# Patient Record
Sex: MALE | Race: BLACK OR AFRICAN AMERICAN | NOT HISPANIC OR LATINO | Employment: OTHER | URBAN - METROPOLITAN AREA
[De-identification: names, ages, dates, MRNs, and addresses within clinical notes are randomized per-mention and may not be internally consistent; named-entity substitution may affect disease eponyms.]

---

## 2017-05-25 RX ORDER — PHENOL/SODIUM PHENOLATE
20 AEROSOL, SPRAY (ML) MUCOUS MEMBRANE DAILY
Status: ON HOLD | COMMUNITY
End: 2020-01-01 | Stop reason: HOSPADM

## 2017-05-25 RX ORDER — ALLOPURINOL 300 MG/1
TABLET ORAL
COMMUNITY
End: 2019-01-14

## 2017-05-25 RX ORDER — MULTIVITAMIN WITH IRON
1 TABLET ORAL DAILY
Status: ON HOLD | COMMUNITY
End: 2020-01-01 | Stop reason: HOSPADM

## 2017-05-25 RX ORDER — NAPROXEN SODIUM 220 MG/1
325 TABLET, FILM COATED ORAL
Status: ON HOLD | COMMUNITY
End: 2020-01-01 | Stop reason: HOSPADM

## 2017-05-25 RX ORDER — CARVEDILOL 25 MG/1
25 TABLET ORAL 2 TIMES DAILY WITH MEALS
Status: ON HOLD | COMMUNITY
End: 2020-01-01 | Stop reason: HOSPADM

## 2017-05-25 RX ORDER — SPIRONOLACTONE 25 MG/1
TABLET ORAL
COMMUNITY
End: 2019-04-11

## 2017-05-25 RX ORDER — AMLODIPINE BESYLATE 10 MG/1
TABLET ORAL
COMMUNITY
End: 2019-01-14

## 2017-05-25 RX ORDER — FINASTERIDE 5 MG/1
TABLET, FILM COATED ORAL
COMMUNITY
End: 2019-03-26 | Stop reason: SDUPTHER

## 2017-05-25 RX ORDER — LISINOPRIL 40 MG/1
20 TABLET ORAL DAILY
Status: ON HOLD | COMMUNITY
End: 2020-01-01 | Stop reason: HOSPADM

## 2017-05-25 RX ORDER — FUROSEMIDE 40 MG/1
TABLET ORAL EVERY OTHER DAY
COMMUNITY
End: 2019-04-11

## 2017-05-25 RX ORDER — GABAPENTIN 100 MG/1
100 CAPSULE ORAL 2 TIMES DAILY
Status: ON HOLD | COMMUNITY
End: 2020-01-01 | Stop reason: HOSPADM

## 2017-05-25 RX ORDER — ATORVASTATIN CALCIUM 40 MG/1
TABLET, FILM COATED ORAL
COMMUNITY
End: 2018-05-30 | Stop reason: ALTCHOICE

## 2017-05-25 RX ORDER — CARBOXYMETHYLCELLULOSE SODIUM 10 MG/ML
GEL OPHTHALMIC
COMMUNITY
End: 2018-05-30 | Stop reason: ALTCHOICE

## 2017-05-25 RX ORDER — POTASSIUM CHLORIDE 750 MG/1
CAPSULE, EXTENDED RELEASE ORAL
COMMUNITY
End: 2018-05-30 | Stop reason: ALTCHOICE

## 2017-06-01 ENCOUNTER — OFFICE VISIT (OUTPATIENT)
Dept: HEMATOLOGY/ONCOLOGY | Facility: CLINIC | Age: 77
End: 2017-06-01
Payer: MEDICARE

## 2017-06-01 VITALS
RESPIRATION RATE: 16 BRPM | DIASTOLIC BLOOD PRESSURE: 78 MMHG | HEIGHT: 71 IN | HEART RATE: 63 BPM | WEIGHT: 209.88 LBS | BODY MASS INDEX: 29.38 KG/M2 | TEMPERATURE: 98 F | SYSTOLIC BLOOD PRESSURE: 130 MMHG

## 2017-06-01 DIAGNOSIS — D64.9 ANEMIA, UNSPECIFIED: Primary | ICD-10-CM

## 2017-06-01 DIAGNOSIS — D63.1 ANEMIA IN CHRONIC KIDNEY DISEASE(285.21): ICD-10-CM

## 2017-06-01 DIAGNOSIS — D63.8 ANEMIA OF OTHER CHRONIC DISEASE: ICD-10-CM

## 2017-06-01 DIAGNOSIS — N18.30 CHRONIC KIDNEY DISEASE, STAGE III (MODERATE): ICD-10-CM

## 2017-06-01 DIAGNOSIS — N18.9 ANEMIA IN CHRONIC KIDNEY DISEASE(285.21): ICD-10-CM

## 2017-06-01 PROCEDURE — 1159F MED LIST DOCD IN RCRD: CPT | Mod: ,,, | Performed by: INTERNAL MEDICINE

## 2017-06-01 PROCEDURE — 1125F AMNT PAIN NOTED PAIN PRSNT: CPT | Mod: ,,, | Performed by: INTERNAL MEDICINE

## 2017-06-01 PROCEDURE — 99213 OFFICE O/P EST LOW 20 MIN: CPT | Mod: ,,, | Performed by: INTERNAL MEDICINE

## 2017-06-01 NOTE — PROGRESS NOTES
[facility] Hematology/Oncology            PROGRESS NOTE      Subjective:       Patient ID:   NAME: Asif Velez : 1940     76 y.o. male    Referring Doc: Obi Russell MD  Other Physicians: Mario Baugh    Chief Complaint:  Results ([facility])      History of Present Illness:     Patient returns today for a regularly scheduled follow-up visit.  The patient is here with his wife. He is doing ok. He denies any CP, SOB, HA's or N/V             ROS:   GEN: normal without any fever, night sweats or weight loss  HEENT: normal with no HA's, sore throat, stiff neck, changes in vision  CV: normal with no CP, SOB, PND, TUBBS or orthopnea  PULM: normal with no SOB, cough, hemoptysis, sputum or pleuritic pain  GI: normal with no abdominal pain, nausea, vomiting, constipation, diarrhea, melanotic stools, BRBPR, or hematemesis  : normal with no hematuria, dysuria  BREAST: normal with no mass, discharge, pain  SKIN: normal with no rash, erythema, bruising, or swelling    Allergies:  Review of patient's allergies indicates:  No Known Allergies    Medications:    Current Outpatient Prescriptions:     allopurinol (ZYLOPRIM) 300 MG tablet, Take by mouth., Disp: , Rfl:     amlodipine (NORVASC) 10 MG tablet, Take by mouth., Disp: , Rfl:     aspirin 81 MG Chew, Take by mouth., Disp: , Rfl:     atorvastatin (LIPITOR) 40 MG tablet, Take by mouth., Disp: , Rfl:     carboxymethylcellulose (REFRESH LIQUIGEL) 1 % ophthalmic solution, Apply to eye., Disp: , Rfl:     carvedilol (COREG) 25 MG tablet, Take by mouth., Disp: , Rfl:     finasteride (PROSCAR) 5 mg tablet, Take by mouth., Disp: , Rfl:     furosemide (LASIX) 40 MG tablet, Take by mouth., Disp: , Rfl:     gabapentin (NEURONTIN) 100 MG capsule, Take by mouth., Disp: , Rfl:     lisinopril (PRINIVIL,ZESTRIL) 40 MG tablet, Take by mouth., Disp: , Rfl:     multivitamin with iron Tab, Take by mouth., Disp: , Rfl:     omeprazole 20 mg TbEC, Take by mouth., Disp: , Rfl:      "potassium chloride (MICRO-K) 10 MEQ CpSR, Take by mouth., Disp: , Rfl:     spironolactone (ALDACTONE) 25 MG tablet, Take by mouth., Disp: , Rfl:     PMHx/PSHx Updates:  See patient's last visit with me on 2/1/17.  See H&P on 1/3/2017      Pathology:  No matching staging information was found for the patient.          Objective:     Vitals:  Blood pressure 130/78, pulse 63, temperature 98.1 °F (36.7 °C), resp. rate 16, height 5' 11" (1.803 m), weight 95.2 kg (209 lb 14.4 oz).    Physical Examination:   GEN: no apparent distress, comfortable; AAOx3  HEAD: atraumatic and normocephalic  EYES: no pallor, no icterus, PERRLA  ENT: OMM, no pharyngeal erythema, external ears WNL; no nasal discharge; no thrush  NECK: no masses, thyroid normal, trachea midline, no LAD/LN's, supple  CV: RRR with no murmur; normal pulse; normal S1 and S2; no pedal edema  CHEST: Normal respiratory effort; CTAB; normal breath sounds; no wheeze or crackles  ABDOM: nontender and nondistended; soft; normal bowel sounds; no rebound/guarding  MUSC/Skeletal: positive arthropathy; uses cane  EXTREM: no clubbing, cyanosis, inflammation or swelling  SKIN: no rashes, lesions, ulcers, petechiae or subcutaneous nodules  : no alvarez  NEURO: grossly intact; motor/sensory WNL; AAOx3; no tremors  PSYCH: normal mood, affect and behavior  LYMPH: normal cervical, supraclavicular, axillary and groin LN's            Labs:   [facility] 4/26/17 on chart      I have reviewed all available lab results and radiology reports.    Radiology/Diagnostic Studies:        Assessment/Plan:   (1) 76 y.o. male with diagnosis of multifactorial anemia issues that has NCNC parameters  - underlying anemia of chronic disorders and anemia of chronic renal disease  - [facility] Dr Ever [facility]izarro  - hgb adequate at 11.1 and relatively stable    (2) CAD, CHF and HTN - followed by Dr Baugh    (3) CRI - referred to Dr Martin with Neph but " he has not seen her as of yet    PLAN:  1. Continue to monitor labs q 3 months  2. F/u with PCP and Neph  3. RTC 6 months  4. Fax note to Lupis Russell [facility]ji and [facility]  5. Check on the nephrology appointment                   Discussion:     I have explained all of the above in detail and the patient understands all of the current recommendation(s). I have answered all of their questions to the best of my ability and to their complete satisfaction.   The patient is to continue with the current management plan.    RTC in  6 months          Electronically signed by Naseem Vazquez MD

## 2017-06-01 NOTE — LETTER
June 1, 2017      Obi Russell MD  [address] [place] [address] [address]  [place] [address]           [facility] Hematology Oncology  [address] [address] [facility]  [address]  [facility] [address]  Phone: [phone]  Fax: [phone]          Patient: Asif Velez   MR Number: [id number]   YOB: 1940   Date of Visit: 6/1/2017       Dear Dr. Obi Russell:    Thank you for referring Asif Velez to me for evaluation. Attached you will find relevant portions of my assessment and plan of care.    If you have questions, please do not hesitate to call me. I look forward to following Asif Velez along with you.    Sincerely,    Naseem Vazquez MD    Enclosure  CC:  No Recipients    If you would like to receive this communication electronically, please contact [email or web address] or [phone] to request more information on [employer] Link access.    For providers and/or their staff who would like to refer a patient to [facility], please contact us through our one-stop-shop provider referral line, [facility], at [phone].    If you feel you have received this communication in error or would no longer like to receive these types of communications, please e-mail [email or web address]

## 2017-10-20 LAB
AMBIG ABBREV CMP 14 DEFAULT: NORMAL
BASOPHILS # BLD AUTO: 0.1 X10E3/UL (ref 0–0.2)
BASOPHILS NFR BLD AUTO: 1 %
EOSINOPHIL # BLD AUTO: 0.2 X10E3/UL (ref 0–0.4)
EOSINOPHIL NFR BLD AUTO: 2 %
ERYTHROCYTE [DISTWIDTH] IN BLOOD BY AUTOMATED COUNT: 18.5 % (ref 12.3–15.4)
FERRITIN SERPL-MCNC: 90 NG/ML (ref 30–400)
HCT VFR BLD AUTO: 34.4 % (ref 37.5–51)
HGB BLD-MCNC: 11.3 G/DL (ref 12.6–17.7)
IMM GRANULOCYTES # BLD: 0 X10E3/UL (ref 0–0.1)
IMM GRANULOCYTES NFR BLD: 0 %
IRON SATN MFR SERPL: 18 % (ref 15–55)
IRON SERPL-MCNC: 50 UG/DL (ref 38–169)
LYMPHOCYTES # BLD AUTO: 2.6 X10E3/UL (ref 0.7–3.1)
LYMPHOCYTES NFR BLD AUTO: 36 %
MCH RBC QN AUTO: 28 PG (ref 26.6–33)
MCHC RBC AUTO-ENTMCNC: 32.8 G/DL (ref 31.5–35.7)
MCV RBC AUTO: 85 FL (ref 79–97)
MONOCYTES # BLD AUTO: 0.5 X10E3/UL (ref 0.1–0.9)
MONOCYTES NFR BLD AUTO: 7 %
NEUTROPHILS # BLD AUTO: 4 X10E3/UL (ref 1.4–7)
NEUTROPHILS NFR BLD AUTO: 54 %
PLATELET # BLD AUTO: 322 X10E3/UL (ref 150–379)
RBC # BLD AUTO: 4.03 X10E6/UL (ref 4.14–5.8)
TIBC SERPL-MCNC: 285 UG/DL (ref 250–450)
UIBC SERPL-MCNC: 235 UG/DL (ref 111–343)
WBC # BLD AUTO: 7.4 X10E3/UL (ref 3.4–10.8)

## 2017-11-29 LAB
ALBUMIN SERPL-MCNC: 4.3 G/DL (ref 3.5–4.8)
ALBUMIN/GLOB SERPL: 1.5 {RATIO} (ref 1.2–2.2)
ALP SERPL-CCNC: 60 IU/L (ref 39–117)
ALT SERPL-CCNC: 12 IU/L (ref 0–44)
AMBIG ABBREV CMP 14 DEFAULT: NORMAL
AST SERPL-CCNC: 21 IU/L (ref 0–40)
BASOPHILS # BLD AUTO: 0 X10E3/UL (ref 0–0.2)
BASOPHILS NFR BLD AUTO: 1 %
BILIRUB SERPL-MCNC: 0.3 MG/DL (ref 0–1.2)
BUN SERPL-MCNC: 23 MG/DL (ref 8–27)
BUN/CREAT SERPL: 15 (ref 10–24)
CALCIUM SERPL-MCNC: 9.8 MG/DL (ref 8.6–10.2)
CHLORIDE SERPL-SCNC: 105 MMOL/L (ref 96–106)
CO2 SERPL-SCNC: 21 MMOL/L (ref 18–29)
CREAT SERPL-MCNC: 1.58 MG/DL (ref 0.76–1.27)
EOSINOPHIL # BLD AUTO: 0.2 X10E3/UL (ref 0–0.4)
EOSINOPHIL NFR BLD AUTO: 2 %
ERYTHROCYTE [DISTWIDTH] IN BLOOD BY AUTOMATED COUNT: 17.9 % (ref 12.3–15.4)
FERRITIN SERPL-MCNC: 83 NG/ML (ref 30–400)
GFR SERPLBLD CREATININE-BSD FMLA CKD-EPI: 41 ML/MIN/1.73
GFR SERPLBLD CREATININE-BSD FMLA CKD-EPI: 48 ML/MIN/1.73
GLOBULIN SER CALC-MCNC: 2.8 G/DL (ref 1.5–4.5)
GLUCOSE SERPL-MCNC: 103 MG/DL (ref 65–99)
HCT VFR BLD AUTO: 35.5 % (ref 37.5–51)
HGB BLD-MCNC: 11.3 G/DL (ref 12.6–17.7)
IMM GRANULOCYTES # BLD: 0 X10E3/UL (ref 0–0.1)
IMM GRANULOCYTES NFR BLD: 0 %
IRON SATN MFR SERPL: 23 % (ref 15–55)
IRON SERPL-MCNC: 64 UG/DL (ref 38–169)
LYMPHOCYTES # BLD AUTO: 2.4 X10E3/UL (ref 0.7–3.1)
LYMPHOCYTES NFR BLD AUTO: 35 %
MCH RBC QN AUTO: 27.8 PG (ref 26.6–33)
MCHC RBC AUTO-ENTMCNC: 31.8 G/DL (ref 31.5–35.7)
MCV RBC AUTO: 87 FL (ref 79–97)
MONOCYTES # BLD AUTO: 0.5 X10E3/UL (ref 0.1–0.9)
MONOCYTES NFR BLD AUTO: 7 %
NEUTROPHILS # BLD AUTO: 3.9 X10E3/UL (ref 1.4–7)
NEUTROPHILS NFR BLD AUTO: 55 %
PLATELET # BLD AUTO: 329 X10E3/UL (ref 150–379)
POTASSIUM SERPL-SCNC: 3.9 MMOL/L (ref 3.5–5.2)
PROT SERPL-MCNC: 7.1 G/DL (ref 6–8.5)
RBC # BLD AUTO: 4.06 X10E6/UL (ref 4.14–5.8)
SODIUM SERPL-SCNC: 143 MMOL/L (ref 134–144)
TIBC SERPL-MCNC: 273 UG/DL (ref 250–450)
UIBC SERPL-MCNC: 209 UG/DL (ref 111–343)
WBC # BLD AUTO: 6.9 X10E3/UL (ref 3.4–10.8)

## 2018-01-08 ENCOUNTER — OFFICE VISIT (OUTPATIENT)
Dept: HEMATOLOGY/ONCOLOGY | Facility: CLINIC | Age: 78
End: 2018-01-08
Payer: MEDICARE

## 2018-01-08 VITALS
WEIGHT: 219.69 LBS | DIASTOLIC BLOOD PRESSURE: 75 MMHG | RESPIRATION RATE: 18 BRPM | HEART RATE: 65 BPM | HEIGHT: 71 IN | TEMPERATURE: 98 F | BODY MASS INDEX: 30.76 KG/M2 | SYSTOLIC BLOOD PRESSURE: 125 MMHG

## 2018-01-08 DIAGNOSIS — N18.30 CHRONIC KIDNEY DISEASE, STAGE III (MODERATE): ICD-10-CM

## 2018-01-08 DIAGNOSIS — D63.8 ANEMIA, CHRONIC DISEASE: ICD-10-CM

## 2018-01-08 DIAGNOSIS — D64.9 ANEMIA, UNSPECIFIED TYPE: Primary | ICD-10-CM

## 2018-01-08 PROCEDURE — 99213 OFFICE O/P EST LOW 20 MIN: CPT | Mod: ,,, | Performed by: INTERNAL MEDICINE

## 2018-01-08 NOTE — LETTER
January 8, 2018      Obi Russell MD  [address] [place] [address] [address]  [facility] [address]           [facility] Hematology Oncology  [address]  [address]  [facility] [address]  Phone: [phone]  Fax: [phone]          Patient: Asif Velez   MR Number: [id number]   YOB: 1940   Date of Visit: 1/8/2018       Dear Dr. Obi Russell:    Thank you for referring Asif Velez to me for evaluation. Attached you will find relevant portions of my assessment and plan of care.    If you have questions, please do not hesitate to call me. I look forward to following Asif Velez along with you.    Sincerely,    Naseem Vazquez MD    Enclosure  CC:  No Recipients    If you would like to receive this communication electronically, please contact [email or web address] or [phone] to request more information on [employer] Link access.    For providers and/or their staff who would like to refer a patient to [facility], please contact us through our one-stop-shop provider referral line, [facility], at [phone].    If you feel you have received this communication in error or would no longer like to receive these types of communications, please e-mail [email or web address]

## 2018-01-08 NOTE — PROGRESS NOTES
[facility] Hematology/Oncology            PROGRESS NOTE      Subjective:       Patient ID:   NAME: Asif Velez : 1940     77 y.o. male    Referring Doc: Obi Russell MD  Other Physicians: Mario Baugh    Chief Complaint:  Anemia f/u    History of Present Illness:     Patient returns today for a regularly scheduled follow-up visit.  The patient is here with his wife. He is doing ok. He denies any CP, SOB, HA's or N/V             ROS:   GEN: normal without any fever, night sweats or weight loss  HEENT: normal with no HA's, sore throat, stiff neck, changes in vision  CV: normal with no CP, SOB, PND, TUBBS or orthopnea  PULM: normal with no SOB, cough, hemoptysis, sputum or pleuritic pain  GI: normal with no abdominal pain, nausea, vomiting, constipation, diarrhea, melanotic stools, BRBPR, or hematemesis  : normal with no hematuria, dysuria  BREAST: normal with no mass, discharge, pain  SKIN: normal with no rash, erythema, bruising, or swelling    Allergies:  Review of patient's allergies indicates:  No Known Allergies    Medications:    Current Outpatient Prescriptions:     allopurinol (ZYLOPRIM) 300 MG tablet, Take by mouth., Disp: , Rfl:     amlodipine (NORVASC) 10 MG tablet, Take by mouth., Disp: , Rfl:     aspirin 81 MG Chew, Take by mouth., Disp: , Rfl:     atorvastatin (LIPITOR) 40 MG tablet, Take by mouth., Disp: , Rfl:     carboxymethylcellulose (REFRESH LIQUIGEL) 1 % ophthalmic solution, Apply to eye., Disp: , Rfl:     carvedilol (COREG) 25 MG tablet, Take by mouth., Disp: , Rfl:     finasteride (PROSCAR) 5 mg tablet, Take by mouth., Disp: , Rfl:     furosemide (LASIX) 40 MG tablet, Take by mouth., Disp: , Rfl:     gabapentin (NEURONTIN) 100 MG capsule, Take by mouth., Disp: , Rfl:     lisinopril (PRINIVIL,ZESTRIL) 40 MG tablet, Take by mouth., Disp: , Rfl:     multivitamin with iron Tab, Take by mouth., Disp: , Rfl:     omeprazole 20 mg TbEC, Take by mouth., Disp: , Rfl:     potassium  "chloride (MICRO-K) 10 MEQ CpSR, Take by mouth., Disp: , Rfl:     spironolactone (ALDACTONE) 25 MG tablet, Take by mouth., Disp: , Rfl:     PMHx/PSHx Updates:  See patient's last visit with me on 2/1/17.  See H&P on 1/3/2017      Pathology:  No matching staging information was found for the patient.          Objective:     Vitals:  Blood pressure 125/75, pulse 65, temperature 98.1 °F (36.7 °C), resp. rate 18, height 5' 11" (1.803 m), weight 99.7 kg (219 lb 11.2 oz).    Physical Examination:   GEN: no apparent distress, comfortable; AAOx3  HEAD: atraumatic and normocephalic  EYES: no pallor, no icterus, PERRLA  ENT: OMM, no pharyngeal erythema, external ears WNL; no nasal discharge; no thrush  NECK: no masses, thyroid normal, trachea midline, no LAD/LN's, supple  CV: RRR with no murmur; normal pulse; normal S1 and S2; no pedal edema  CHEST: Normal respiratory effort; CTAB; normal breath sounds; no wheeze or crackles  ABDOM: nontender and nondistended; soft; normal bowel sounds; no rebound/guarding  MUSC/Skeletal: positive arthropathy; uses cane  EXTREM: no clubbing, cyanosis, inflammation or swelling  SKIN: no rashes, lesions, ulcers, petechiae or subcutaneous nodules  : no alvarez  NEURO: grossly intact; motor/sensory WNL; AAOx3; no tremors  PSYCH: normal mood, affect and behavior  LYMPH: normal cervical, supraclavicular, axillary and groin LN's            Labs:   11/28/2017  Lab Results   Component Value Date    WBC 6.9 11/28/2017    HGB 11.3 (L) 11/28/2017    HCT 35.5 (L) 11/28/2017    MCV 87 11/28/2017     11/28/2017     CMP  Sodium   Date Value Ref Range Status   11/28/2017 143 134 - 144 mmol/L Final     Potassium   Date Value Ref Range Status   11/28/2017 3.9 3.5 - 5.2 mmol/L Final     Chloride   Date Value Ref Range Status   11/28/2017 105 96 - 106 mmol/L Final     CO2   Date Value Ref Range Status   11/28/2017 21 18 - 29 mmol/L Final     Glucose   Date Value Ref Range Status   11/28/2017 103 (H) 65 - " 99 mg/dL Final     BUN, Bld   Date Value Ref Range Status   11/28/2017 23 8 - 27 mg/dL Final     Creatinine   Date Value Ref Range Status   11/28/2017 1.58 (H) 0.76 - 1.27 mg/dL Final     Calcium   Date Value Ref Range Status   11/28/2017 9.8 8.6 - 10.2 mg/dL Final     Total Protein   Date Value Ref Range Status   11/28/2017 7.1 6.0 - 8.5 g/dL Final     Albumin   Date Value Ref Range Status   11/28/2017 4.3 3.5 - 4.8 g/dL Final     Total Bilirubin   Date Value Ref Range Status   11/28/2017 0.3 0.0 - 1.2 mg/dL Final     Alkaline Phosphatase   Date Value Ref Range Status   11/28/2017 60 39 - 117 IU/L Final     AST   Date Value Ref Range Status   11/28/2017 21 0 - 40 IU/L Final     ALT   Date Value Ref Range Status   11/28/2017 12 0 - 44 IU/L Final     eGFR if non    Date Value Ref Range Status   11/28/2017 41 (L) >59 mL/min/1.73 Final     Lab Results   Component Value Date    ALT 12 11/28/2017    AST 21 11/28/2017    ALKPHOS 60 11/28/2017    BILITOT 0.3 11/28/2017     Lab Results   Component Value Date    IRON 64 11/28/2017    TIBC 273 11/28/2017    FERRITIN 83 11/28/2017           I have reviewed all available lab results and radiology reports.    Radiology/Diagnostic Studies:        Assessment/Plan:   (1) 77 y.o. male with diagnosis of multifactorial anemia issues that has NCNC parameters  - underlying anemia of chronic disorders and anemia of chronic renal disease  - [facility] Ever Pizarro  - hgb adequate at 11.3 and relatively stable    (2) CAD, CHF and HTN - followed by Dr Baugh    (3) CRI - referred to Dr Martin with Neph but he still has not seen her as of yet    PLAN:  1. Continue to monitor labs q 3 months  2. F/u with PCP and Neph  3. RTC 6 months  4. Fax note to Lupis Russell Ganji and Juarez  5. Check on the nephrology appointment                   Discussion:     I have explained all of the above in detail and the patient  understands all of the current recommendation(s). I have answered all of their questions to the best of my ability and to their complete satisfaction.   The patient is to continue with the current management plan.    RTC in  6 months          Electronically signed by Naseem Vazquez MD

## 2018-05-05 LAB
ALBUMIN SERPL-MCNC: 4.1 G/DL (ref 3.5–4.8)
ALBUMIN/GLOB SERPL: 1.4 {RATIO} (ref 1.2–2.2)
ALP SERPL-CCNC: 70 IU/L (ref 39–117)
ALT SERPL-CCNC: 12 IU/L (ref 0–44)
AST SERPL-CCNC: 17 IU/L (ref 0–40)
BASOPHILS # BLD AUTO: 0 X10E3/UL (ref 0–0.2)
BASOPHILS NFR BLD AUTO: 0 %
BILIRUB SERPL-MCNC: 0.6 MG/DL (ref 0–1.2)
BUN SERPL-MCNC: 20 MG/DL (ref 8–27)
BUN/CREAT SERPL: 13 (ref 10–24)
CALCIUM SERPL-MCNC: 9.3 MG/DL (ref 8.6–10.2)
CHLORIDE SERPL-SCNC: 106 MMOL/L (ref 96–106)
CO2 SERPL-SCNC: 20 MMOL/L (ref 18–29)
CREAT SERPL-MCNC: 1.52 MG/DL (ref 0.76–1.27)
EOSINOPHIL # BLD AUTO: 0.2 X10E3/UL (ref 0–0.4)
EOSINOPHIL NFR BLD AUTO: 2 %
ERYTHROCYTE [DISTWIDTH] IN BLOOD BY AUTOMATED COUNT: 18.3 % (ref 12.3–15.4)
FERRITIN SERPL-MCNC: 124 NG/ML (ref 30–400)
GFR SERPLBLD CREATININE-BSD FMLA CKD-EPI: 43 ML/MIN/1.73
GFR SERPLBLD CREATININE-BSD FMLA CKD-EPI: 50 ML/MIN/1.73
GLOBULIN SER CALC-MCNC: 2.9 G/DL (ref 1.5–4.5)
GLUCOSE SERPL-MCNC: 97 MG/DL (ref 65–99)
HCT VFR BLD AUTO: 34.5 % (ref 37.5–51)
HGB BLD-MCNC: 10.7 G/DL (ref 13–17.7)
IMM GRANULOCYTES # BLD: 0 X10E3/UL (ref 0–0.1)
IMM GRANULOCYTES NFR BLD: 0 %
IRON SATN MFR SERPL: 10 % (ref 15–55)
IRON SERPL-MCNC: 24 UG/DL (ref 38–169)
LYMPHOCYTES # BLD AUTO: 2.5 X10E3/UL (ref 0.7–3.1)
LYMPHOCYTES NFR BLD AUTO: 28 %
MCH RBC QN AUTO: 27.2 PG (ref 26.6–33)
MCHC RBC AUTO-ENTMCNC: 31 G/DL (ref 31.5–35.7)
MCV RBC AUTO: 88 FL (ref 79–97)
MONOCYTES # BLD AUTO: 0.5 X10E3/UL (ref 0.1–0.9)
MONOCYTES NFR BLD AUTO: 6 %
NEUTROPHILS # BLD AUTO: 5.9 X10E3/UL (ref 1.4–7)
NEUTROPHILS NFR BLD AUTO: 64 %
PLATELET # BLD AUTO: 279 X10E3/UL (ref 150–379)
POTASSIUM SERPL-SCNC: 4.3 MMOL/L (ref 3.5–5.2)
PROT SERPL-MCNC: 7 G/DL (ref 6–8.5)
RBC # BLD AUTO: 3.93 X10E6/UL (ref 4.14–5.8)
SODIUM SERPL-SCNC: 143 MMOL/L (ref 134–144)
TIBC SERPL-MCNC: 247 UG/DL (ref 250–450)
UIBC SERPL-MCNC: 223 UG/DL (ref 111–343)
WBC # BLD AUTO: 9.1 X10E3/UL (ref 3.4–10.8)

## 2018-05-30 ENCOUNTER — OFFICE VISIT (OUTPATIENT)
Dept: UROLOGY | Facility: CLINIC | Age: 78
End: 2018-05-30
Payer: MEDICARE

## 2018-05-30 ENCOUNTER — APPOINTMENT (OUTPATIENT)
Dept: LAB | Facility: HOSPITAL | Age: 78
End: 2018-05-30
Attending: UROLOGY
Payer: MEDICARE

## 2018-05-30 VITALS
BODY MASS INDEX: 30.65 KG/M2 | RESPIRATION RATE: 18 BRPM | TEMPERATURE: 98 F | HEART RATE: 82 BPM | HEIGHT: 71 IN | SYSTOLIC BLOOD PRESSURE: 123 MMHG | DIASTOLIC BLOOD PRESSURE: 66 MMHG | WEIGHT: 218.94 LBS

## 2018-05-30 DIAGNOSIS — R82.81 PYURIA: ICD-10-CM

## 2018-05-30 DIAGNOSIS — R31.29 MICROHEMATURIA: Primary | ICD-10-CM

## 2018-05-30 DIAGNOSIS — R10.9 LEFT FLANK PAIN: ICD-10-CM

## 2018-05-30 DIAGNOSIS — N28.1 RENAL CYST: ICD-10-CM

## 2018-05-30 DIAGNOSIS — N40.0 BENIGN PROSTATIC HYPERPLASIA WITHOUT LOWER URINARY TRACT SYMPTOMS: ICD-10-CM

## 2018-05-30 LAB
BILIRUB SERPL-MCNC: ABNORMAL MG/DL
BLOOD URINE, POC: ABNORMAL
COLOR, POC UA: YELLOW
GLUCOSE UR QL STRIP: ABNORMAL
KETONES UR QL STRIP: ABNORMAL
LEUKOCYTE ESTERASE URINE, POC: ABNORMAL
NITRITE, POC UA: ABNORMAL
PH, POC UA: 5
PROTEIN, POC: ABNORMAL
SPECIFIC GRAVITY, POC UA: 1.01
UROBILINOGEN, POC UA: ABNORMAL

## 2018-05-30 PROCEDURE — 87186 SC STD MICRODIL/AGAR DIL: CPT

## 2018-05-30 PROCEDURE — 87088 URINE BACTERIA CULTURE: CPT

## 2018-05-30 PROCEDURE — 81002 URINALYSIS NONAUTO W/O SCOPE: CPT | Mod: S$GLB,,, | Performed by: UROLOGY

## 2018-05-30 PROCEDURE — 99999 PR PBB SHADOW E&M-EST. PATIENT-LVL III: CPT | Mod: PBBFAC,,, | Performed by: UROLOGY

## 2018-05-30 PROCEDURE — 99204 OFFICE O/P NEW MOD 45 MIN: CPT | Mod: 25,S$GLB,, | Performed by: UROLOGY

## 2018-05-30 PROCEDURE — 87077 CULTURE AEROBIC IDENTIFY: CPT

## 2018-05-30 PROCEDURE — 87086 URINE CULTURE/COLONY COUNT: CPT

## 2018-05-30 PROCEDURE — 88112 CYTOPATH CELL ENHANCE TECH: CPT | Performed by: PATHOLOGY

## 2018-05-30 PROCEDURE — 88112 CYTOPATH CELL ENHANCE TECH: CPT | Mod: 26,,, | Performed by: PATHOLOGY

## 2018-05-30 RX ORDER — LANOLIN ALCOHOL/MO/W.PET/CERES
400 CREAM (GRAM) TOPICAL DAILY
Status: ON HOLD | COMMUNITY
End: 2020-01-01 | Stop reason: HOSPADM

## 2018-05-30 RX ORDER — AMOXICILLIN 500 MG
CAPSULE ORAL DAILY
COMMUNITY
End: 2019-04-11
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
## 2018-05-30 NOTE — LETTER
May 30, 2018      Obi Russell MD  [address] [place] [address] [address]  [facility] [address]           [place] - Urology  [address] [place] [address] [address]  [place] [address]  Phone: [phone]  Fax: [phone]          Patient: Asif Velez   MR Number: [id number]   YOB: 1940   Date of Visit: 5/30/2018       Dear Dr. Obi Russell:    Thank you for referring Asif Velez to me for evaluation. Attached you will find relevant portions of my assessment and plan of care.    If you have questions, please do not hesitate to call me. I look forward to following Asif Velez along with you.    Sincerely,    Ange Chacon MD    Enclosure  CC:  No Recipients    If you would like to receive this communication electronically, please contact [email or web address] or [phone] to request more information on [employer] Link access.    For providers and/or their staff who would like to refer a patient to [facility], please contact us through our one-stop-shop provider referral line, [facility], at [phone].    If you feel you have received this communication in error or would no longer like to receive these types of communications, please e-mail [email or web address]

## 2018-06-02 LAB — BACTERIA UR CULT: NORMAL

## 2018-06-03 RX ORDER — SULFAMETHOXAZOLE AND TRIMETHOPRIM 800; 160 MG/1; MG/1
1 TABLET ORAL 2 TIMES DAILY
Qty: 30 TABLET | Refills: 0 | OUTPATIENT
Start: 2018-06-03 | End: 2018-06-13

## 2018-06-05 ENCOUNTER — HOSPITAL ENCOUNTER (OUTPATIENT)
Dept: RADIOLOGY | Facility: HOSPITAL | Age: 78
Discharge: HOME | End: 2018-06-05
Attending: UROLOGY
Payer: MEDICARE

## 2018-06-05 DIAGNOSIS — N28.1 RENAL CYST: ICD-10-CM

## 2018-06-05 PROCEDURE — 76770 US EXAM ABDO BACK WALL COMP: CPT | Mod: 26,,, | Performed by: RADIOLOGY

## 2018-06-05 PROCEDURE — 76770 US EXAM ABDO BACK WALL COMP: CPT | Mod: TC

## 2018-06-06 ENCOUNTER — TELEPHONE (OUTPATIENT)
Dept: UROLOGY | Facility: CLINIC | Age: 78
End: 2018-06-06

## 2018-06-06 NOTE — TELEPHONE ENCOUNTER
Call placed to give urine culture results and to inform of antibiotic being sent in to his pharmacy, no answer, message left with call back number.

## 2018-06-06 NOTE — TELEPHONE ENCOUNTER
----- Message from Ange Chacon MD sent at 6/3/2018  5:11 PM CDT -----  C&S - e.coli    Rec: Bactrim DS po bid x 2 weeks           Awaiting renal U/S and Cytology

## 2018-06-21 ENCOUNTER — TELEPHONE (OUTPATIENT)
Dept: UROLOGY | Facility: CLINIC | Age: 78
End: 2018-06-21

## 2018-06-21 NOTE — TELEPHONE ENCOUNTER
----- Message from Marie Ivan sent at 6/21/2018  1:28 PM CDT -----  Type:  Test Results    Who Called:  Patient  Name of Test (Lab/Mammo/Etc): Labs   Date of Test:  May 30th  Ordering Provider:  Dr. Chacon  Where the test was performed:  [facility]  Best Call Back Number:  [phone]  Additional Information:

## 2018-06-21 NOTE — TELEPHONE ENCOUNTER
Returned call and informed patient that the MD just came back and is working on results, once they have been resulted, will call him back to inform.

## 2018-08-02 ENCOUNTER — OFFICE VISIT (OUTPATIENT)
Dept: HEMATOLOGY/ONCOLOGY | Facility: CLINIC | Age: 78
End: 2018-08-02
Payer: MEDICARE

## 2018-08-02 VITALS
WEIGHT: 217.38 LBS | RESPIRATION RATE: 18 BRPM | TEMPERATURE: 98 F | SYSTOLIC BLOOD PRESSURE: 156 MMHG | DIASTOLIC BLOOD PRESSURE: 85 MMHG | HEART RATE: 81 BPM | BODY MASS INDEX: 30.32 KG/M2

## 2018-08-02 DIAGNOSIS — D64.9 ANEMIA, UNSPECIFIED TYPE: Primary | ICD-10-CM

## 2018-08-02 DIAGNOSIS — D63.1 ANEMIA, CHRONIC RENAL FAILURE, STAGE 3 (MODERATE): ICD-10-CM

## 2018-08-02 DIAGNOSIS — D64.89 ANEMIA DUE TO MULTIPLE MECHANISMS: ICD-10-CM

## 2018-08-02 DIAGNOSIS — N18.30 ANEMIA, CHRONIC RENAL FAILURE, STAGE 3 (MODERATE): ICD-10-CM

## 2018-08-02 DIAGNOSIS — D63.8 ANEMIA, CHRONIC DISEASE: ICD-10-CM

## 2018-08-02 PROCEDURE — 99213 OFFICE O/P EST LOW 20 MIN: CPT | Mod: ,,, | Performed by: INTERNAL MEDICINE

## 2018-08-02 NOTE — LETTER
August 2, 2018      Obi Russell MD  [address] [place] [address] [address]  [place] [address]           [facility] - Hematology Oncology  [address]  [address]  [place] [address]  Phone: [phone]  Fax: [phone]          Patient: Asif Velez   MR Number: [id number]   YOB: 1940   Date of Visit: 8/2/2018       Dear Dr. Obi Russell:    Thank you for referring Asif Velez to me for evaluation. Attached you will find relevant portions of my assessment and plan of care.    If you have questions, please do not hesitate to call me. I look forward to following Asif Velez along with you.    Sincerely,    Naseem Vazquez MD    Enclosure  CC:  No Recipients    If you would like to receive this communication electronically, please contact [email or web address] or [phone] to request more information on [employer] Link access.    For providers and/or their staff who would like to refer a patient to [facility], please contact us through our one-stop-shop provider referral line, [facility], at [phone].    If you feel you have received this communication in error or would no longer like to receive these types of communications, please e-mail [email or web address]

## 2018-08-02 NOTE — PROGRESS NOTES
[facility] Hematology/Oncology            PROGRESS NOTE      Subjective:       Patient ID:   NAME: Asif Velez : 1940     77 y.o. male    Referring Doc: Obi Russell MD  Other Physicians: Mario Baugh    Chief Complaint:  Anemia f/u    History of Present Illness:     Patient returns today for a regularly scheduled follow-up visit.  The patient is here by himself. He is doing ok except for some ankle swelling left greater than right. He also has some skin itching in lower extremities. He saw his PCP and had US done. He denies any CP, SOB, HA's or N/V             ROS:   GEN: normal without any fever, night sweats or weight loss  HEENT: normal with no HA's, sore throat, stiff neck, changes in vision  CV: normal with no CP, SOB, PND, TUBBS or orthopnea  PULM: normal with no SOB, cough, hemoptysis, sputum or pleuritic pain  GI: normal with no abdominal pain, nausea, vomiting, constipation, diarrhea, melanotic stools, BRBPR, or hematemesis  : normal with no hematuria, dysuria  BREAST: normal with no mass, discharge, pain  SKIN: normal with no rash, erythema, bruising, or swelling    Allergies:  Review of patient's allergies indicates:  No Known Allergies    Medications:    Current Outpatient Prescriptions:     allopurinol (ZYLOPRIM) 300 MG tablet, Take by mouth., Disp: , Rfl:     amlodipine (NORVASC) 10 MG tablet, Take by mouth., Disp: , Rfl:     aspirin 81 MG Chew, Take by mouth., Disp: , Rfl:     carvedilol (COREG) 25 MG tablet, Take by mouth., Disp: , Rfl:     finasteride (PROSCAR) 5 mg tablet, Take by mouth., Disp: , Rfl:     fish oil-omega-3 fatty acids 300-1,000 mg capsule, Take by mouth once daily., Disp: , Rfl:     furosemide (LASIX) 40 MG tablet, Take by mouth every other day. , Disp: , Rfl:     gabapentin (NEURONTIN) 100 MG capsule, Take by mouth., Disp: , Rfl:     lisinopril (PRINIVIL,ZESTRIL) 40 MG tablet, Take by mouth., Disp: , Rfl:     magnesium oxide (MAG-OX) 400 mg tablet, Take 400  mg by mouth once daily., Disp: , Rfl:     multivitamin with iron Tab, Take by mouth., Disp: , Rfl:     omeprazole 20 mg TbEC, Take by mouth., Disp: , Rfl:     spironolactone (ALDACTONE) 25 MG tablet, Take by mouth., Disp: , Rfl:     PMHx/PSHx Updates:  See patient's last visit with me on 1/8/2018.  See H&P on 1/3/2017      Pathology:  No matching staging information was found for the patient.          Objective:     Vitals:  Blood pressure (!) 156/85, pulse 81, temperature 98.1 °F (36.7 °C), resp. rate 18, weight 98.6 kg (217 lb 6.4 oz).    Physical Examination:   GEN: no apparent distress, comfortable; AAOx3  HEAD: atraumatic and normocephalic  EYES: no pallor, no icterus, PERRLA  ENT: OMM, no pharyngeal erythema, external ears WNL; no nasal discharge; no thrush  NECK: no masses, thyroid normal, trachea midline, no LAD/LN's, supple  CV: RRR with no murmur; normal pulse; normal S1 and S2; no pedal edema  CHEST: Normal respiratory effort; CTAB; normal breath sounds; no wheeze or crackles  ABDOM: nontender and nondistended; soft; normal bowel sounds; no rebound/guarding  MUSC/Skeletal: positive arthropathy; uses cane  EXTREM: no clubbing, cyanosis, inflammation or swelling  SKIN: no rashes, lesions, ulcers, petechiae or subcutaneous nodules  : no alvarez  NEURO: grossly intact; motor/sensory WNL; AAOx3; no tremors  PSYCH: normal mood, affect and behavior  LYMPH: normal cervical, supraclavicular, axillary and groin LN's            Labs:     5/4/2018    Lab Results   Component Value Date    WBC 9.1 05/04/2018    HGB 10.7 (L) 05/04/2018    HCT 34.5 (L) 05/04/2018    MCV 88 05/04/2018     05/04/2018     CMP  Sodium   Date Value Ref Range Status   05/04/2018 143 134 - 144 mmol/L Final     Potassium   Date Value Ref Range Status   05/04/2018 4.3 3.5 - 5.2 mmol/L Final     Chloride   Date Value Ref Range Status   05/04/2018 106 96 - 106 mmol/L Final     CO2   Date Value Ref Range Status   05/04/2018 20 18 - 29  mmol/L Final     Glucose   Date Value Ref Range Status   05/04/2018 97 65 - 99 mg/dL Final     BUN, Bld   Date Value Ref Range Status   05/04/2018 20 8 - 27 mg/dL Final     Creatinine   Date Value Ref Range Status   05/04/2018 1.52 (H) 0.76 - 1.27 mg/dL Final     Calcium   Date Value Ref Range Status   05/04/2018 9.3 8.6 - 10.2 mg/dL Final     Total Protein   Date Value Ref Range Status   05/04/2018 7.0 6.0 - 8.5 g/dL Final     Albumin   Date Value Ref Range Status   05/04/2018 4.1 3.5 - 4.8 g/dL Final     Total Bilirubin   Date Value Ref Range Status   05/04/2018 0.6 0.0 - 1.2 mg/dL Final     Alkaline Phosphatase   Date Value Ref Range Status   05/04/2018 70 39 - 117 IU/L Final     AST   Date Value Ref Range Status   05/04/2018 17 0 - 40 IU/L Final     ALT   Date Value Ref Range Status   05/04/2018 12 0 - 44 IU/L Final     eGFR if non    Date Value Ref Range Status   05/04/2018 43 (L) >59 mL/min/1.73 Final     Lab Results   Component Value Date    ALT 12 05/04/2018    AST 17 05/04/2018    ALKPHOS 70 05/04/2018    BILITOT 0.6 05/04/2018 7/13/2018    Lab Results   Component Value Date    IRON 101 07/13/2018    TIBC 265 07/13/2018    FERRITIN 96 07/13/2018           I have reviewed all available lab results and radiology reports.    Radiology/Diagnostic Studies:    CT abdom 6/14/2018    Assessment/Plan:   (1) 77 y.o. male with diagnosis of multifactorial anemia issues that has NCNC parameters  - underlying anemia of chronic disorders and anemia of chronic renal disease  - [facility] Dr Ever Pizarro  - hgb adequate at 10.7 as of May 2018    (2) CAD, CHF and HTN - followed by Dr Baugh    (3) CRI - stage III - referred to Dr Martin with Neph but he still has not seen her as of yet    1. Anemia, unspecified type     2. Anemia, chronic disease     3. Anemia, chronic renal failure, stage 3 (moderate)     4. Anemia due to multiple mechanisms            PLAN:  1. Continue to monitor labs q 3 months  2. F/u with PCP, , CArd and Neph  3. RTC 6 months  4. Fax note to Lupis Russell Ganji, Daftery and Juarez  5. He sees Dr Russell in 1-2 weeks                     Discussion:     I have explained all of the above in detail and the patient understands all of the current recommendation(s). I have answered all of their questions to the best of my ability and to their complete satisfaction.   The patient is to continue with the current management plan.    RTC in  6 months          Electronically signed by Naseem Vazquez MD

## 2018-10-25 ENCOUNTER — TELEPHONE (OUTPATIENT)
Dept: UROLOGY | Facility: CLINIC | Age: 78
End: 2018-10-25

## 2018-10-25 NOTE — TELEPHONE ENCOUNTER
Returned call to new number, phone went straight to voicemail, unable to leave message as mailbox is not set up.

## 2018-10-25 NOTE — TELEPHONE ENCOUNTER
----- Message from Queenie Hedrick sent at 10/25/2018  4:22 PM CDT -----  Contact: Pt came to registration desk  Patient came at lunch time to  the results of his tests Dr. Chacon ordered some time back.     NOTE: You may have been trying to reach him, but he changed his telephone number  I updated his new telephone number & ER contact name/number

## 2019-01-01 ENCOUNTER — OFFICE VISIT (OUTPATIENT)
Dept: UROLOGY | Facility: CLINIC | Age: 79
End: 2019-01-01
Payer: MEDICARE

## 2019-01-01 ENCOUNTER — TELEPHONE (OUTPATIENT)
Dept: UROLOGY | Facility: CLINIC | Age: 79
End: 2019-01-01

## 2019-01-01 ENCOUNTER — OFFICE VISIT (OUTPATIENT)
Dept: RHEUMATOLOGY | Facility: CLINIC | Age: 79
End: 2019-01-01
Payer: MEDICARE

## 2019-01-01 ENCOUNTER — OFFICE VISIT (OUTPATIENT)
Dept: HEMATOLOGY/ONCOLOGY | Facility: CLINIC | Age: 79
End: 2019-01-01
Payer: MEDICARE

## 2019-01-01 ENCOUNTER — APPOINTMENT (OUTPATIENT)
Dept: LAB | Facility: HOSPITAL | Age: 79
End: 2019-01-01
Attending: UROLOGY
Payer: MEDICARE

## 2019-01-01 VITALS
SYSTOLIC BLOOD PRESSURE: 131 MMHG | RESPIRATION RATE: 18 BRPM | BODY MASS INDEX: 29.94 KG/M2 | TEMPERATURE: 98 F | DIASTOLIC BLOOD PRESSURE: 70 MMHG | DIASTOLIC BLOOD PRESSURE: 68 MMHG | HEART RATE: 76 BPM | WEIGHT: 218 LBS | SYSTOLIC BLOOD PRESSURE: 129 MMHG | HEIGHT: 71 IN | BODY MASS INDEX: 30.4 KG/M2 | WEIGHT: 213.88 LBS

## 2019-01-01 VITALS
DIASTOLIC BLOOD PRESSURE: 70 MMHG | WEIGHT: 220 LBS | TEMPERATURE: 98 F | BODY MASS INDEX: 30.68 KG/M2 | RESPIRATION RATE: 18 BRPM | HEART RATE: 71 BPM | SYSTOLIC BLOOD PRESSURE: 130 MMHG

## 2019-01-01 VITALS
HEIGHT: 71 IN | SYSTOLIC BLOOD PRESSURE: 125 MMHG | WEIGHT: 220.44 LBS | BODY MASS INDEX: 30.86 KG/M2 | HEART RATE: 72 BPM | DIASTOLIC BLOOD PRESSURE: 70 MMHG

## 2019-01-01 DIAGNOSIS — D64.9 ANEMIA, UNSPECIFIED TYPE: ICD-10-CM

## 2019-01-01 DIAGNOSIS — R39.14 BENIGN PROSTATIC HYPERPLASIA WITH INCOMPLETE BLADDER EMPTYING: ICD-10-CM

## 2019-01-01 DIAGNOSIS — R31.29 MICROSCOPIC HEMATURIA: ICD-10-CM

## 2019-01-01 DIAGNOSIS — M15.9 OSTEOARTHRITIS, GENERALIZED: Primary | ICD-10-CM

## 2019-01-01 DIAGNOSIS — D63.1 ANEMIA, CHRONIC RENAL FAILURE, STAGE 3 (MODERATE): ICD-10-CM

## 2019-01-01 DIAGNOSIS — N40.1 BENIGN PROSTATIC HYPERPLASIA WITH INCOMPLETE BLADDER EMPTYING: ICD-10-CM

## 2019-01-01 DIAGNOSIS — N39.0 URINARY TRACT INFECTION WITHOUT HEMATURIA, SITE UNSPECIFIED: ICD-10-CM

## 2019-01-01 DIAGNOSIS — D63.8 ANEMIA, CHRONIC DISEASE: ICD-10-CM

## 2019-01-01 DIAGNOSIS — N39.0 URINARY TRACT INFECTION WITHOUT HEMATURIA, SITE UNSPECIFIED: Primary | ICD-10-CM

## 2019-01-01 DIAGNOSIS — N18.30 ANEMIA, CHRONIC RENAL FAILURE, STAGE 3 (MODERATE): ICD-10-CM

## 2019-01-01 DIAGNOSIS — R82.81 PYURIA: Primary | ICD-10-CM

## 2019-01-01 DIAGNOSIS — D64.89 ANEMIA DUE TO MULTIPLE MECHANISMS: Primary | ICD-10-CM

## 2019-01-01 LAB
ALBUMIN SERPL-MCNC: 4.2 G/DL (ref 3.5–4.8)
ALBUMIN/GLOB SERPL: 1.6 {RATIO} (ref 1.2–2.2)
ALP SERPL-CCNC: 68 IU/L (ref 39–117)
ALT SERPL-CCNC: 15 IU/L (ref 0–44)
AST SERPL-CCNC: 22 IU/L (ref 0–40)
BACTERIA UR CULT: NORMAL
BACTERIA UR CULT: NORMAL
BASOPHILS # BLD AUTO: 0 X10E3/UL (ref 0–0.2)
BASOPHILS NFR BLD AUTO: 1 %
BILIRUB SERPL-MCNC: 0.3 MG/DL (ref 0–1.2)
BILIRUB SERPL-MCNC: ABNORMAL MG/DL
BILIRUB SERPL-MCNC: ABNORMAL MG/DL
BLOOD URINE, POC: ABNORMAL
BLOOD URINE, POC: ABNORMAL
BUN SERPL-MCNC: 16 MG/DL (ref 8–27)
BUN/CREAT SERPL: 11 (ref 10–24)
CALCIUM SERPL-MCNC: 9.1 MG/DL (ref 8.6–10.2)
CHLORIDE SERPL-SCNC: 109 MMOL/L (ref 96–106)
CO2 SERPL-SCNC: 20 MMOL/L (ref 20–29)
COLOR, POC UA: YELLOW
COLOR, POC UA: YELLOW
CREAT SERPL-MCNC: 1.49 MG/DL (ref 0.76–1.27)
EOSINOPHIL # BLD AUTO: 0.2 X10E3/UL (ref 0–0.4)
EOSINOPHIL NFR BLD AUTO: 2 %
ERYTHROCYTE [DISTWIDTH] IN BLOOD BY AUTOMATED COUNT: 16.8 % (ref 12.3–15.4)
FERRITIN SERPL-MCNC: 35 NG/ML (ref 30–400)
GLOBULIN SER CALC-MCNC: 2.7 G/DL (ref 1.5–4.5)
GLUCOSE SERPL-MCNC: 108 MG/DL (ref 65–99)
GLUCOSE UR QL STRIP: ABNORMAL
GLUCOSE UR QL STRIP: ABNORMAL
HCT VFR BLD AUTO: 36.4 % (ref 37.5–51)
HGB BLD-MCNC: 12 G/DL (ref 13–17.7)
IMM GRANULOCYTES # BLD AUTO: 0 X10E3/UL (ref 0–0.1)
IMM GRANULOCYTES NFR BLD AUTO: 0 %
IRON SATN MFR SERPL: 23 % (ref 15–55)
IRON SERPL-MCNC: 71 UG/DL (ref 38–169)
KETONES UR QL STRIP: ABNORMAL
KETONES UR QL STRIP: ABNORMAL
LEUKOCYTE ESTERASE URINE, POC: ABNORMAL
LEUKOCYTE ESTERASE URINE, POC: ABNORMAL
LYMPHOCYTES # BLD AUTO: 2.4 X10E3/UL (ref 0.7–3.1)
LYMPHOCYTES NFR BLD AUTO: 39 %
MCH RBC QN AUTO: 27.8 PG (ref 26.6–33)
MCHC RBC AUTO-ENTMCNC: 33 G/DL (ref 31.5–35.7)
MCV RBC AUTO: 85 FL (ref 79–97)
MONOCYTES # BLD AUTO: 0.4 X10E3/UL (ref 0.1–0.9)
MONOCYTES NFR BLD AUTO: 6 %
NEUTROPHILS # BLD AUTO: 3.3 X10E3/UL (ref 1.4–7)
NEUTROPHILS NFR BLD AUTO: 52 %
NITRITE, POC UA: ABNORMAL
NITRITE, POC UA: ABNORMAL
PH, POC UA: 5
PH, POC UA: 5.5
PLATELET # BLD AUTO: 256 X10E3/UL (ref 150–450)
POTASSIUM SERPL-SCNC: 4 MMOL/L (ref 3.5–5.2)
PROT SERPL-MCNC: 6.9 G/DL (ref 6–8.5)
PROTEIN, POC: ABNORMAL
PROTEIN, POC: ABNORMAL
RBC # BLD AUTO: 4.31 X10E6/UL (ref 4.14–5.8)
SODIUM SERPL-SCNC: 146 MMOL/L (ref 134–144)
SPECIFIC GRAVITY, POC UA: 1.02
SPECIFIC GRAVITY, POC UA: 1.02
TIBC SERPL-MCNC: 303 UG/DL (ref 250–450)
UIBC SERPL-MCNC: 232 UG/DL (ref 111–343)
UROBILINOGEN, POC UA: ABNORMAL
UROBILINOGEN, POC UA: ABNORMAL
WBC # BLD AUTO: 6.3 X10E3/UL (ref 3.4–10.8)

## 2019-01-01 PROCEDURE — 1101F PR PT FALLS ASSESS DOC 0-1 FALLS W/OUT INJ PAST YR: ICD-10-PCS | Mod: CPTII,S$GLB,, | Performed by: UROLOGY

## 2019-01-01 PROCEDURE — 87086 URINE CULTURE/COLONY COUNT: CPT

## 2019-01-01 PROCEDURE — 87088 URINE BACTERIA CULTURE: CPT

## 2019-01-01 PROCEDURE — 99999 PR PBB SHADOW E&M-EST. PATIENT-LVL III: ICD-10-PCS | Mod: PBBFAC,,, | Performed by: UROLOGY

## 2019-01-01 PROCEDURE — 99213 PR OFFICE/OUTPT VISIT, EST, LEVL III, 20-29 MIN: ICD-10-PCS | Mod: S$GLB,,, | Performed by: INTERNAL MEDICINE

## 2019-01-01 PROCEDURE — 99214 OFFICE O/P EST MOD 30 MIN: CPT | Mod: 25,S$GLB,, | Performed by: UROLOGY

## 2019-01-01 PROCEDURE — 1101F PR PT FALLS ASSESS DOC 0-1 FALLS W/OUT INJ PAST YR: ICD-10-PCS | Mod: S$GLB,,, | Performed by: INTERNAL MEDICINE

## 2019-01-01 PROCEDURE — 99999 PR PBB SHADOW E&M-EST. PATIENT-LVL III: CPT | Mod: PBBFAC,,, | Performed by: UROLOGY

## 2019-01-01 PROCEDURE — 87077 CULTURE AEROBIC IDENTIFY: CPT | Mod: 59

## 2019-01-01 PROCEDURE — 51798 PR MEAS,POST-VOID RES,US,NON-IMAGING: ICD-10-PCS | Mod: S$GLB,,, | Performed by: UROLOGY

## 2019-01-01 PROCEDURE — 99214 PR OFFICE/OUTPT VISIT, EST, LEVL IV, 30-39 MIN: ICD-10-PCS | Mod: 25,S$GLB,, | Performed by: UROLOGY

## 2019-01-01 PROCEDURE — 1101F PT FALLS ASSESS-DOCD LE1/YR: CPT | Mod: ,,, | Performed by: INTERNAL MEDICINE

## 2019-01-01 PROCEDURE — 1101F PT FALLS ASSESS-DOCD LE1/YR: CPT | Mod: S$GLB,,, | Performed by: INTERNAL MEDICINE

## 2019-01-01 PROCEDURE — 81002 URINALYSIS NONAUTO W/O SCOPE: CPT | Mod: S$GLB,,, | Performed by: UROLOGY

## 2019-01-01 PROCEDURE — 99213 OFFICE O/P EST LOW 20 MIN: CPT | Mod: ,,, | Performed by: INTERNAL MEDICINE

## 2019-01-01 PROCEDURE — 1101F PR PT FALLS ASSESS DOC 0-1 FALLS W/OUT INJ PAST YR: ICD-10-PCS | Mod: ,,, | Performed by: INTERNAL MEDICINE

## 2019-01-01 PROCEDURE — 99213 PR OFFICE/OUTPT VISIT, EST, LEVL III, 20-29 MIN: ICD-10-PCS | Mod: ,,, | Performed by: INTERNAL MEDICINE

## 2019-01-01 PROCEDURE — 87186 SC STD MICRODIL/AGAR DIL: CPT

## 2019-01-01 PROCEDURE — 81002 POCT URINE DIPSTICK WITHOUT MICROSCOPE: ICD-10-PCS | Mod: S$GLB,,, | Performed by: UROLOGY

## 2019-01-01 PROCEDURE — 1101F PT FALLS ASSESS-DOCD LE1/YR: CPT | Mod: CPTII,S$GLB,, | Performed by: UROLOGY

## 2019-01-01 PROCEDURE — 51798 US URINE CAPACITY MEASURE: CPT | Mod: S$GLB,,, | Performed by: UROLOGY

## 2019-01-01 PROCEDURE — 99213 OFFICE O/P EST LOW 20 MIN: CPT | Mod: S$GLB,,, | Performed by: INTERNAL MEDICINE

## 2019-01-01 PROCEDURE — 87077 CULTURE AEROBIC IDENTIFY: CPT

## 2019-01-01 PROCEDURE — 88112 CYTOPATH CELL ENHANCE TECH: CPT | Performed by: PATHOLOGY

## 2019-01-01 PROCEDURE — 88112 CYTOLOGY SPECIMEN-URINE: ICD-10-PCS | Mod: 26,,, | Performed by: PATHOLOGY

## 2019-01-01 RX ORDER — ALFUZOSIN HYDROCHLORIDE 10 MG/1
10 TABLET, EXTENDED RELEASE ORAL
Qty: 30 TABLET | Refills: 11 | Status: ON HOLD | OUTPATIENT
Start: 2019-01-01 | End: 2020-01-01 | Stop reason: HOSPADM

## 2019-01-01 RX ORDER — ICOSAPENT ETHYL 1000 MG/1
1 CAPSULE ORAL DAILY
Status: ON HOLD | COMMUNITY
Start: 2019-01-01 | End: 2020-01-01 | Stop reason: HOSPADM

## 2019-01-01 RX ORDER — SULFAMETHOXAZOLE AND TRIMETHOPRIM 800; 160 MG/1; MG/1
1 TABLET ORAL 2 TIMES DAILY
Qty: 30 TABLET | Refills: 0 | Status: ON HOLD | OUTPATIENT
Start: 2019-01-01 | End: 2020-01-01 | Stop reason: HOSPADM

## 2019-01-01 RX ORDER — CEFUROXIME AXETIL 500 MG/1
500 TABLET ORAL 2 TIMES DAILY
Qty: 30 TABLET | Refills: 0 | Status: ON HOLD | OUTPATIENT
Start: 2019-01-01 | End: 2020-01-01 | Stop reason: HOSPADM

## 2019-01-01 RX ORDER — SULFAMETHOXAZOLE AND TRIMETHOPRIM 800; 160 MG/1; MG/1
1 TABLET ORAL 2 TIMES DAILY
Qty: 30 TABLET | Refills: 0 | OUTPATIENT
Start: 2019-01-01 | End: 2020-01-01

## 2019-01-04 LAB
ALBUMIN SERPL-MCNC: 4.1 G/DL (ref 3.5–4.8)
ALBUMIN/GLOB SERPL: 1.5 {RATIO} (ref 1.2–2.2)
ALP SERPL-CCNC: 70 IU/L (ref 39–117)
ALT SERPL-CCNC: 14 IU/L (ref 0–44)
AST SERPL-CCNC: 20 IU/L (ref 0–40)
BASOPHILS # BLD AUTO: 0 X10E3/UL (ref 0–0.2)
BASOPHILS NFR BLD AUTO: 0 %
BILIRUB SERPL-MCNC: <0.2 MG/DL (ref 0–1.2)
BUN SERPL-MCNC: 23 MG/DL (ref 8–27)
BUN/CREAT SERPL: 15 (ref 10–24)
CALCIUM SERPL-MCNC: 9.7 MG/DL (ref 8.6–10.2)
CHLORIDE SERPL-SCNC: 105 MMOL/L (ref 96–106)
CO2 SERPL-SCNC: 23 MMOL/L (ref 20–29)
CREAT SERPL-MCNC: 1.49 MG/DL (ref 0.76–1.27)
EGFR IF AFRICAN AMERICAN: 51 ML/MIN/1.73
EOSINOPHIL # BLD AUTO: 0.2 X10E3/UL (ref 0–0.4)
EOSINOPHIL NFR BLD AUTO: 2 %
ERYTHROCYTE [DISTWIDTH] IN BLOOD BY AUTOMATED COUNT: 16.5 % (ref 12.3–15.4)
EST. GFR  (NON AFRICAN AMERICAN): 44 ML/MIN/1.73
FERRITIN SERPL-MCNC: 112 NG/ML (ref 30–400)
GLOBULIN SER CALC-MCNC: 2.8 G/DL (ref 1.5–4.5)
GLUCOSE SERPL-MCNC: 86 MG/DL (ref 65–99)
HCT VFR BLD AUTO: 35.3 % (ref 37.5–51)
HGB BLD-MCNC: 10.9 G/DL (ref 13–17.7)
IMM GRANULOCYTES # BLD: 0 X10E3/UL (ref 0–0.1)
IMM GRANULOCYTES NFR BLD: 0 %
IRON SATN MFR SERPL: 27 % (ref 15–55)
IRON SERPL-MCNC: 78 UG/DL (ref 38–169)
LYMPHOCYTES # BLD AUTO: 2.7 X10E3/UL (ref 0.7–3.1)
LYMPHOCYTES NFR BLD AUTO: 39 %
MCH RBC QN AUTO: 26.7 PG (ref 26.6–33)
MCHC RBC AUTO-ENTMCNC: 30.9 G/DL (ref 31.5–35.7)
MCV RBC AUTO: 87 FL (ref 79–97)
MONOCYTES # BLD AUTO: 0.5 X10E3/UL (ref 0.1–0.9)
MONOCYTES NFR BLD AUTO: 7 %
NEUTROPHILS # BLD AUTO: 3.4 X10E3/UL (ref 1.4–7)
NEUTROPHILS NFR BLD AUTO: 52 %
PLATELET # BLD AUTO: 308 X10E3/UL (ref 150–379)
POTASSIUM SERPL-SCNC: 4.6 MMOL/L (ref 3.5–5.2)
PROT SERPL-MCNC: 6.9 G/DL (ref 6–8.5)
RBC # BLD AUTO: 4.08 X10E6/UL (ref 4.14–5.8)
SODIUM SERPL-SCNC: 142 MMOL/L (ref 134–144)
TIBC SERPL-MCNC: 290 UG/DL (ref 250–450)
UIBC SERPL-MCNC: 212 UG/DL (ref 111–343)
WBC # BLD AUTO: 6.8 X10E3/UL (ref 3.4–10.8)

## 2019-01-11 ENCOUNTER — TELEPHONE (OUTPATIENT)
Dept: UROLOGY | Facility: CLINIC | Age: 79
End: 2019-01-11

## 2019-01-11 NOTE — TELEPHONE ENCOUNTER
Spoke with patient, informed we received a referral from his MD to follow up with us for dysuria and nocturia. Soonest appt made to see the NP, patient agreed, patient verbally understood.

## 2019-01-14 ENCOUNTER — OFFICE VISIT (OUTPATIENT)
Dept: UROLOGY | Facility: CLINIC | Age: 79
End: 2019-01-14
Payer: MEDICARE

## 2019-01-14 VITALS
SYSTOLIC BLOOD PRESSURE: 168 MMHG | RESPIRATION RATE: 18 BRPM | BODY MASS INDEX: 30.49 KG/M2 | HEIGHT: 71 IN | TEMPERATURE: 98 F | DIASTOLIC BLOOD PRESSURE: 102 MMHG | HEART RATE: 98 BPM | WEIGHT: 217.81 LBS

## 2019-01-14 DIAGNOSIS — R30.0 DYSURIA: ICD-10-CM

## 2019-01-14 DIAGNOSIS — R35.1 NOCTURIA: ICD-10-CM

## 2019-01-14 DIAGNOSIS — N39.0 URINARY TRACT INFECTION WITH HEMATURIA, SITE UNSPECIFIED: ICD-10-CM

## 2019-01-14 DIAGNOSIS — N32.3 BLADDER DIVERTICULUM: Primary | ICD-10-CM

## 2019-01-14 DIAGNOSIS — R31.9 URINARY TRACT INFECTION WITH HEMATURIA, SITE UNSPECIFIED: ICD-10-CM

## 2019-01-14 LAB
BILIRUB SERPL-MCNC: ABNORMAL MG/DL
BLOOD URINE, POC: ABNORMAL
COLOR, POC UA: YELLOW
GLUCOSE UR QL STRIP: ABNORMAL
KETONES UR QL STRIP: ABNORMAL
LEUKOCYTE ESTERASE URINE, POC: ABNORMAL
NITRITE, POC UA: ABNORMAL
PH, POC UA: 6
POC RESIDUAL URINE VOLUME: 146 ML (ref 0–100)
PROTEIN, POC: 300
SPECIFIC GRAVITY, POC UA: 1.02
UROBILINOGEN, POC UA: 0.2

## 2019-01-14 PROCEDURE — 87186 SC STD MICRODIL/AGAR DIL: CPT

## 2019-01-14 PROCEDURE — 87088 URINE BACTERIA CULTURE: CPT

## 2019-01-14 PROCEDURE — 1101F PT FALLS ASSESS-DOCD LE1/YR: CPT | Mod: CPTII,S$GLB,, | Performed by: NURSE PRACTITIONER

## 2019-01-14 PROCEDURE — 99999 PR PBB SHADOW E&M-EST. PATIENT-LVL IV: ICD-10-PCS | Mod: PBBFAC,,, | Performed by: NURSE PRACTITIONER

## 2019-01-14 PROCEDURE — 99999 PR PBB SHADOW E&M-EST. PATIENT-LVL IV: CPT | Mod: PBBFAC,,, | Performed by: NURSE PRACTITIONER

## 2019-01-14 PROCEDURE — 99214 PR OFFICE/OUTPT VISIT, EST, LEVL IV, 30-39 MIN: ICD-10-PCS | Mod: 25,S$GLB,, | Performed by: NURSE PRACTITIONER

## 2019-01-14 PROCEDURE — 81002 POCT URINE DIPSTICK WITHOUT MICROSCOPE: ICD-10-PCS | Mod: S$GLB,,, | Performed by: NURSE PRACTITIONER

## 2019-01-14 PROCEDURE — 87077 CULTURE AEROBIC IDENTIFY: CPT

## 2019-01-14 PROCEDURE — 87086 URINE CULTURE/COLONY COUNT: CPT

## 2019-01-14 PROCEDURE — 51798 US URINE CAPACITY MEASURE: CPT | Mod: S$GLB,,, | Performed by: NURSE PRACTITIONER

## 2019-01-14 PROCEDURE — 1101F PR PT FALLS ASSESS DOC 0-1 FALLS W/OUT INJ PAST YR: ICD-10-PCS | Mod: CPTII,S$GLB,, | Performed by: NURSE PRACTITIONER

## 2019-01-14 PROCEDURE — 81002 URINALYSIS NONAUTO W/O SCOPE: CPT | Mod: S$GLB,,, | Performed by: NURSE PRACTITIONER

## 2019-01-14 PROCEDURE — 51798 POCT BLADDER SCAN: ICD-10-PCS | Mod: S$GLB,,, | Performed by: NURSE PRACTITIONER

## 2019-01-14 PROCEDURE — 99214 OFFICE O/P EST MOD 30 MIN: CPT | Mod: 25,S$GLB,, | Performed by: NURSE PRACTITIONER

## 2019-01-14 RX ORDER — FLUOCINONIDE 0.5 MG/G
CREAM TOPICAL
Refills: 2 | COMMUNITY
Start: 2018-12-28 | End: 2019-04-11

## 2019-01-14 RX ORDER — HYDRALAZINE HYDROCHLORIDE 10 MG/1
10 TABLET, FILM COATED ORAL 2 TIMES DAILY
Status: ON HOLD | COMMUNITY
End: 2020-01-01 | Stop reason: HOSPADM

## 2019-01-14 RX ORDER — POTASSIUM CHLORIDE 750 MG/1
10 TABLET, EXTENDED RELEASE ORAL
COMMUNITY
End: 2019-04-11

## 2019-01-14 RX ORDER — CIPROFLOXACIN 500 MG/1
500 TABLET ORAL 2 TIMES DAILY
Qty: 28 TABLET | Refills: 0 | Status: SHIPPED | OUTPATIENT
Start: 2019-01-14 | End: 2019-01-17 | Stop reason: ALTCHOICE

## 2019-01-14 NOTE — PATIENT INSTRUCTIONS
Bladder Infection, Male (Adult)    You have a bladder infection.  Urine is normally free of bacteria. But bacteria can get into the urinary tract from the skin around the rectum or it may travel in the blood from elsewhere in the body.  This is called a urinary tract infection (UTI). An infection can occur anywhere in the urinary tract. It could be in a kidney (pyelonephritis)or in the bladder (cystitis) and urethra (urethritis). The urethra is the tube that drains the urine from the bladder through the tip of the penis.  The most common place for a UTI is in the bladder. This is called a bladder infection. Most bladder infections are easily treated. They are not serious unless the infection spreads up to the kidney.  The terms bladder infection, UTI, and cystitis are often used to describe the same thing, but they arent always the same. Cystitis is an inflammation of the bladder. The most common cause of cystitis is an infection.   Keep in mind:  · Infections in the urine are called UTIs.  · Cystitis is usually caused by a UTI.  · Not all UTIs and cases of cystitis are bladder infections.  · Bladder infections are the most common type of cystitis.  Symptoms of a bladder infection  The infection causes inflammation in the urethra and bladder. This inflammation causes many of the symptoms. The most common symptoms of a bladder infection are:  · Pain or burning when urinating  · Having to go more often than usual  · Feeling like you need to go right away  · Only a small amount comes out  · Blood in urine  · Discomfort in your belly (abdomen), usually in the lower abdomen, above the pubic bone  · Cloudy, strong, or bad smelling urine  · Unable to urinate (retention)  · Urinary incontinence  · Fever  · Loss of appetite  Older adults may also feel confused.  Causes of a bladder infection  Bladder infections are not contagious. You can't get one from someone else, from a toilet seat, or from sharing a bath.  The most  common cause of bladder infections is bacteria from the bowels. The bacteria get onto the skin around the opening of the urethra. From there they can get into the urine and travel up to the bladder. This causes inflammation and an infection. This usually happens because of:  · An enlarged prostate  · Poor cleaning of the genitals  · Procedures that put a tube in your bladder, like a Iraheta catheter  · Bowel incontinence  · Older age  · Not emptying your bladder (The urine stays there, giving the bacteria a chance to grow.)  · Dehydration (This allows urine to stay in the bladder longer.)  · Constipation (This can cause the bowels to push on the bladder or urethra and keep the bladder from emptying.)  Treatment  Bladder infections are treated with antibiotics. They usually clear up quickly without complications. Treatment helps prevent a more serious kidney infection.  Medicines  Medicines can help in the treatment of a bladder infection:  · You may have been given phenazopyridine to ease burning when you urinate. It will cause your urine to be bright orange. It can stain clothing.  · You may have been prescribed antibiotics. Take this medicine until you have finished it, even if you feel better. Taking all of the medicine will make sure the infection has cleared.  You can use acetaminophen or ibuprofen for pain, fever, or discomfort, unless another medicine was prescribed. You can also alternate them, or use both together. They work differently and are a different class of medicines, so taking them together is not an overdose. If you have chronic liver or kidney disease, talk with your healthcare provider before using these medicines. Also talk with your provider if youve had a stomach ulcer or GI bleeding or are taking blood thinner medicines.  Home care  Here are some guidelines to help you care for yourself at home:  · Drink plenty of fluids, unless your healthcare provider told you not to. Fluids will prevent  dehydration and flush out your bladder.  · Use good personal hygiene. Wipe from front to back after using the toilet, and clean your penis regularly. If you arent circumcised, retract the foreskin when cleaning.  · Urinate more frequently, and dont try to hold it in for long periods of time, if possible.  · Wear loose-fitting clothes and cotton underwear. Avoid tight-fitting pants. This helps keep you clean and dry.  · Change your diet to prevent constipation. This means eating more fresh foods and more fiber, and less junk and fatty foods.  · Avoid sex until your symptoms are gone.  · Avoid caffeine, alcohol, and spicy foods. These can irritate the bladder.  Follow-up care  Follow up with your healthcare provider, or as advised if all symptoms have not cleared up within 5 days. It is important to keep your follow-up appointment. You can talk with your provider to see if you need more tests of the urinary tract. This is especially important if you have infections that keep coming back.  If a culture was done, you will be told if your treatment needs to be changed. If directed, you can call to find out the results.  If X-rays were taken, you will be told of any findings that may affect your care.  Call 911  Call 911 if any of these occur:  · Trouble breathing  · Difficulty waking up  · Feeling confused  · Fainting or loss of consciousness  · Rapid heart rate  When to seek medical advice  Call your healthcare provider right away if any of these occur:  · Fever of 100.4ºF (38ºC) or higher, or as directed by your healthcare provider  · Your symptoms dont improve after 2 days of treatment  · Back or abdominal pain that gets worse  · Repeated vomiting, or you arent able to keep medicine down  · Weakness or dizziness  Date Last Reviewed: 10/1/2016  © 7739-9506 [employer]. [address] [place] [address], [place] [address]. All rights reserved. This information is not intended as a substitute for professional  medical care. Always follow your healthcare professional's instructions.
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
## 2019-01-17 ENCOUNTER — TELEPHONE (OUTPATIENT)
Dept: UROLOGY | Facility: CLINIC | Age: 79
End: 2019-01-17

## 2019-01-17 LAB — BACTERIA UR CULT: NORMAL

## 2019-01-17 RX ORDER — SULFAMETHOXAZOLE AND TRIMETHOPRIM 800; 160 MG/1; MG/1
1 TABLET ORAL 2 TIMES DAILY
Qty: 28 TABLET | Refills: 0 | Status: SHIPPED | OUTPATIENT
Start: 2019-01-17 | End: 2019-01-31

## 2019-01-17 NOTE — TELEPHONE ENCOUNTER
Called patient to give results of urine culture and explain to him to discontinue the current antibiotic, cipro, and to start taking bactrim instead which would treat the infection. Patient voiced understanding and will follow up as scheduled.

## 2019-02-05 NOTE — PROGRESS NOTES
[facility] Hematology/Oncology            PROGRESS NOTE      Subjective:       Patient ID:   NAME: Asif Velez : 1940     78 y.o. male    Referring Doc: Obi Russell MD  Other Physicians: Mario Baugh    Chief Complaint:  Anemia f/u    History of Present Illness:     Patient returns today for a regularly scheduled follow-up visit.  The patient is here by himself. He is doing ok .He saw his  Sonal DOUGLASPinkyXochitl recently on 2019.  He denies any CP, SOB, HA's or N/V . He had recent rash on LUE which is getting better and he saw Derm Dr Weil and is on a cream.             ROS:   GEN: normal without any fever, night sweats or weight loss  HEENT: normal with no HA's, sore throat, stiff neck, changes in vision  CV: normal with no CP, SOB, PND, TUBBS or orthopnea  PULM: normal with no SOB, cough, hemoptysis, sputum or pleuritic pain  GI: normal with no abdominal pain, nausea, vomiting, constipation, diarrhea, melanotic stools, BRBPR, or hematemesis  : normal with no hematuria, dysuria  BREAST: normal with no mass, discharge, pain  SKIN: normal with no  erythema, bruising, or swelling; rash LUE improving    Allergies:  Review of patient's allergies indicates:  No Known Allergies    Medications:    Current Outpatient Medications:     aspirin 81 MG Chew, Take by mouth., Disp: , Rfl:     carvedilol (COREG) 25 MG tablet, Take by mouth., Disp: , Rfl:     finasteride (PROSCAR) 5 mg tablet, Take by mouth., Disp: , Rfl:     fish oil-omega-3 fatty acids 300-1,000 mg capsule, Take by mouth once daily., Disp: , Rfl:     FLUOCINONIDE-E 0.05 % cream, APPLY TWICE A DAY TO LEGS, Disp: , Rfl: 2    furosemide (LASIX) 40 MG tablet, Take by mouth every other day. , Disp: , Rfl:     gabapentin (NEURONTIN) 100 MG capsule, Take by mouth., Disp: , Rfl:     hydrALAZINE (APRESOLINE) 10 MG tablet, Take 10 mg by mouth 2 (two) times daily., Disp: , Rfl:     lisinopril (PRINIVIL,ZESTRIL) 40 MG tablet, Take by mouth., Disp: , Rfl:      magnesium oxide (MAG-OX) 400 mg tablet, Take 400 mg by mouth once daily., Disp: , Rfl:     multivitamin with iron Tab, Take by mouth., Disp: , Rfl:     omeprazole 20 mg TbEC, Take by mouth., Disp: , Rfl:     potassium chloride (KLOR-CON) 10 MEQ TbSR, Take 10 mEq by mouth every 48 hours., Disp: , Rfl:     spironolactone (ALDACTONE) 25 MG tablet, Take by mouth., Disp: , Rfl:     PMHx/PSHx Updates:  See patient's last visit with me on 8/2/2018.  See H&P on 1/3/2017      Pathology:  No matching staging information was found for the patient.          Objective:     Vitals:  Blood pressure 131/72, pulse 74, temperature 98.4 °F (36.9 °C), resp. rate 20, weight 95.8 kg (211 lb 1.6 oz).    Physical Examination:   GEN: no apparent distress, comfortable; AAOx3  HEAD: atraumatic and normocephalic  EYES: no pallor, no icterus, PERRLA  ENT: OMM, no pharyngeal erythema, external ears WNL; no nasal discharge; no thrush  NECK: no masses, thyroid normal, trachea midline, no LAD/LN's, supple  CV: RRR with no murmur; normal pulse; normal S1 and S2; no pedal edema  CHEST: Normal respiratory effort; CTAB; normal breath sounds; no wheeze or crackles  ABDOM: nontender and nondistended; soft; normal bowel sounds; no rebound/guarding  MUSC/Skeletal: positive arthropathy; uses cane  EXTREM: no clubbing, cyanosis, inflammation or swelling  SKIN: no  lesions, ulcers, petechiae or subcutaneous nodules; rash improving on LUE forearm  : no alvarez  NEURO: grossly intact; motor/sensory WNL; AAOx3; no tremors  PSYCH: normal mood, affect and behavior  LYMPH: normal cervical, supraclavicular, axillary and groin LN's            Labs:     1/3/2019    Lab Results   Component Value Date    WBC 6.8 01/03/2019    HGB 10.9 (L) 01/03/2019    HCT 35.3 (L) 01/03/2019    MCV 87 01/03/2019     01/03/2019     CMP  Sodium   Date Value Ref Range Status   01/03/2019 142 134 - 144 mmol/L Final     Potassium   Date Value Ref Range Status   01/03/2019  4.6 3.5 - 5.2 mmol/L Final     Chloride   Date Value Ref Range Status   01/03/2019 105 96 - 106 mmol/L Final     CO2   Date Value Ref Range Status   01/03/2019 23 20 - 29 mmol/L Final     Glucose   Date Value Ref Range Status   01/03/2019 86 65 - 99 mg/dL Final     BUN, Bld   Date Value Ref Range Status   01/03/2019 23 8 - 27 mg/dL Final     Creatinine   Date Value Ref Range Status   01/03/2019 1.49 (H) 0.76 - 1.27 mg/dL Final     Calcium   Date Value Ref Range Status   01/03/2019 9.7 8.6 - 10.2 mg/dL Final     Total Protein   Date Value Ref Range Status   01/03/2019 6.9 6.0 - 8.5 g/dL Final     Albumin   Date Value Ref Range Status   01/03/2019 4.1 3.5 - 4.8 g/dL Final     Total Bilirubin   Date Value Ref Range Status   01/03/2019 <0.2 0.0 - 1.2 mg/dL Final     Alkaline Phosphatase   Date Value Ref Range Status   01/03/2019 70 39 - 117 IU/L Final     AST   Date Value Ref Range Status   01/03/2019 20 0 - 40 IU/L Final     ALT   Date Value Ref Range Status   01/03/2019 14 0 - 44 IU/L Final     eGFR if non    Date Value Ref Range Status   01/03/2019 44 (L) >59 mL/min/1.73 Final     Lab Results   Component Value Date    ALT 14 01/03/2019    AST 20 01/03/2019    ALKPHOS 70 01/03/2019    BILITOT <0.2 01/03/2019       1/3/2019    Lab Results   Component Value Date    IRON 78 01/03/2019    TIBC 290 01/03/2019    FERRITIN 112 01/03/2019           I have reviewed all available lab results and radiology reports.    Radiology/Diagnostic Studies:    CT abdom 6/14/2018    Assessment/Plan:   (1) 78 y.o. male with diagnosis of multifactorial anemia issues that has NCNC parameters  - underlying anemia of chronic disorders and anemia of chronic renal disease  - [facility] Dr Ever Pizarro  - hgb stable at 10.9      (2) CAD, CHF and HTN - followed by Dr Baugh    (3) CRI - stage III - referred to Dr Martin with Neph but he still has not seen her as of yet    1.  Anemia, chronic renal failure, stage 3 (moderate)     2. Anemia, chronic disease     3. Anemia due to multiple mechanisms     4. Anemia, unspecified type           PLAN:  1. Continue to monitor labs q 3 months  2. F/u with PCP, , Card and Neph  3. RTC 6 months  4. Fax note to Lupis Russell Ganji, Daftery and Juarez                       Discussion:     I have explained all of the above in detail and the patient understands all of the current recommendation(s). I have answered all of their questions to the best of my ability and to their complete satisfaction.   The patient is to continue with the current management plan.    RTC in  6 months          Electronically signed by Naseem Vazquez MD

## 2019-02-06 ENCOUNTER — OFFICE VISIT (OUTPATIENT)
Dept: HEMATOLOGY/ONCOLOGY | Facility: CLINIC | Age: 79
End: 2019-02-06
Payer: MEDICARE

## 2019-02-06 VITALS
RESPIRATION RATE: 20 BRPM | SYSTOLIC BLOOD PRESSURE: 131 MMHG | BODY MASS INDEX: 29.44 KG/M2 | DIASTOLIC BLOOD PRESSURE: 72 MMHG | WEIGHT: 211.13 LBS | HEART RATE: 74 BPM | TEMPERATURE: 98 F

## 2019-02-06 DIAGNOSIS — D63.1 ANEMIA, CHRONIC RENAL FAILURE, STAGE 3 (MODERATE): Primary | ICD-10-CM

## 2019-02-06 DIAGNOSIS — D64.89 ANEMIA DUE TO MULTIPLE MECHANISMS: ICD-10-CM

## 2019-02-06 DIAGNOSIS — D63.8 ANEMIA, CHRONIC DISEASE: ICD-10-CM

## 2019-02-06 DIAGNOSIS — D64.9 ANEMIA, UNSPECIFIED TYPE: ICD-10-CM

## 2019-02-06 DIAGNOSIS — N18.30 ANEMIA, CHRONIC RENAL FAILURE, STAGE 3 (MODERATE): Primary | ICD-10-CM

## 2019-02-06 PROCEDURE — 1101F PR PT FALLS ASSESS DOC 0-1 FALLS W/OUT INJ PAST YR: ICD-10-PCS | Mod: ,,, | Performed by: INTERNAL MEDICINE

## 2019-02-06 PROCEDURE — 99213 OFFICE O/P EST LOW 20 MIN: CPT | Mod: ,,, | Performed by: INTERNAL MEDICINE

## 2019-02-06 PROCEDURE — 1101F PT FALLS ASSESS-DOCD LE1/YR: CPT | Mod: ,,, | Performed by: INTERNAL MEDICINE

## 2019-02-06 PROCEDURE — 99213 PR OFFICE/OUTPT VISIT, EST, LEVL III, 20-29 MIN: ICD-10-PCS | Mod: ,,, | Performed by: INTERNAL MEDICINE

## 2019-02-06 NOTE — LETTER
February 6, 2019      Obi Russell MD  [address] [place] [address] [address]  [place] [address]           [facility]  [address]  [address]  [place] [address]  Phone: [phone]  Fax: [phone]          Patient: Asif Velez   MR Number: [id number]   YOB: 1940   Date of Visit: 2/6/2019       Dear Dr. Obi Russell:    Thank you for referring Asif Velez to me for evaluation. Attached you will find relevant portions of my assessment and plan of care.    If you have questions, please do not hesitate to call me. I look forward to following Asif Velez along with you.    Sincerely,    Naseem Vazquez MD    Enclosure  CC:  No Recipients    If you would like to receive this communication electronically, please contact [email or web address] or [phone] to request more information on [employer] Link access.    For providers and/or their staff who would like to refer a patient to [facility], please contact us through our one-stop-shop provider referral line, [facility], at [phone].    If you feel you have received this communication in error or would no longer like to receive these types of communications, please e-mail [email or web address]
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
## 2019-02-11 ENCOUNTER — OFFICE VISIT (OUTPATIENT)
Dept: UROLOGY | Facility: CLINIC | Age: 79
End: 2019-02-11
Payer: MEDICARE

## 2019-02-11 VITALS
DIASTOLIC BLOOD PRESSURE: 76 MMHG | RESPIRATION RATE: 18 BRPM | WEIGHT: 208.13 LBS | BODY MASS INDEX: 29.14 KG/M2 | HEART RATE: 94 BPM | TEMPERATURE: 98 F | HEIGHT: 71 IN | SYSTOLIC BLOOD PRESSURE: 126 MMHG

## 2019-02-11 DIAGNOSIS — R39.14 BENIGN PROSTATIC HYPERPLASIA WITH INCOMPLETE BLADDER EMPTYING: ICD-10-CM

## 2019-02-11 DIAGNOSIS — N40.1 BENIGN PROSTATIC HYPERPLASIA WITH INCOMPLETE BLADDER EMPTYING: ICD-10-CM

## 2019-02-11 DIAGNOSIS — N39.0 URINARY TRACT INFECTION WITHOUT HEMATURIA, SITE UNSPECIFIED: Primary | ICD-10-CM

## 2019-02-11 LAB
BILIRUB SERPL-MCNC: ABNORMAL MG/DL
BLOOD URINE, POC: ABNORMAL
COLOR, POC UA: YELLOW
GLUCOSE UR QL STRIP: ABNORMAL
KETONES UR QL STRIP: ABNORMAL
LEUKOCYTE ESTERASE URINE, POC: ABNORMAL
NITRITE, POC UA: ABNORMAL
PH, POC UA: 5
POC RESIDUAL URINE VOLUME: 185 ML (ref 0–100)
PROTEIN, POC: 30
SPECIFIC GRAVITY, POC UA: 1.02
UROBILINOGEN, POC UA: 0.2

## 2019-02-11 PROCEDURE — 81002 POCT URINE DIPSTICK WITHOUT MICROSCOPE: ICD-10-PCS | Mod: S$GLB,,, | Performed by: NURSE PRACTITIONER

## 2019-02-11 PROCEDURE — 1101F PT FALLS ASSESS-DOCD LE1/YR: CPT | Mod: CPTII,S$GLB,, | Performed by: NURSE PRACTITIONER

## 2019-02-11 PROCEDURE — 87086 URINE CULTURE/COLONY COUNT: CPT

## 2019-02-11 PROCEDURE — 51798 PR MEAS,POST-VOID RES,US,NON-IMAGING: ICD-10-PCS | Mod: S$GLB,,, | Performed by: NURSE PRACTITIONER

## 2019-02-11 PROCEDURE — 87077 CULTURE AEROBIC IDENTIFY: CPT

## 2019-02-11 PROCEDURE — 81002 URINALYSIS NONAUTO W/O SCOPE: CPT | Mod: S$GLB,,, | Performed by: NURSE PRACTITIONER

## 2019-02-11 PROCEDURE — 99213 OFFICE O/P EST LOW 20 MIN: CPT | Mod: 25,S$GLB,, | Performed by: NURSE PRACTITIONER

## 2019-02-11 PROCEDURE — 87186 SC STD MICRODIL/AGAR DIL: CPT

## 2019-02-11 PROCEDURE — 99999 PR PBB SHADOW E&M-EST. PATIENT-LVL V: CPT | Mod: PBBFAC,,, | Performed by: NURSE PRACTITIONER

## 2019-02-11 PROCEDURE — 1101F PR PT FALLS ASSESS DOC 0-1 FALLS W/OUT INJ PAST YR: ICD-10-PCS | Mod: CPTII,S$GLB,, | Performed by: NURSE PRACTITIONER

## 2019-02-11 PROCEDURE — 99213 PR OFFICE/OUTPT VISIT, EST, LEVL III, 20-29 MIN: ICD-10-PCS | Mod: 25,S$GLB,, | Performed by: NURSE PRACTITIONER

## 2019-02-11 PROCEDURE — 99999 PR PBB SHADOW E&M-EST. PATIENT-LVL V: ICD-10-PCS | Mod: PBBFAC,,, | Performed by: NURSE PRACTITIONER

## 2019-02-11 PROCEDURE — 87088 URINE BACTERIA CULTURE: CPT

## 2019-02-11 PROCEDURE — 51798 US URINE CAPACITY MEASURE: CPT | Mod: S$GLB,,, | Performed by: NURSE PRACTITIONER

## 2019-02-11 RX ORDER — TAMSULOSIN HYDROCHLORIDE 0.4 MG/1
0.4 CAPSULE ORAL
Qty: 30 CAPSULE | Refills: 12 | Status: SHIPPED | OUTPATIENT
Start: 2019-02-11 | End: 2019-03-26 | Stop reason: ALTCHOICE

## 2019-02-11 NOTE — PATIENT INSTRUCTIONS
Urinary Retention (Male)  Urinary retention is the medical term for difficulty or inability to pass urine, even though your bladder is full.  Causes  The most common cause of urinary retention in men is the bladder outlet being blocked. This can be due to an enlarged prostate gland or a bladder infection. Certain medicines can also cause this problem. This condition is more likely to occur as men get older.    This condition is treated by insertion of a catheter into the bladder to drain the urine. This provides immediate relief. The catheter may need to remain in place for a few days to prevent a recurrence. The catheter has a balloon on the tip which was inflated after insertion. This prevents the catheter from falling out.  Symptoms  Common symptoms of urinary retention include:  · Pain (not experienced by everyone)  · Frequent urination  · Feeling that the bladder is still full after urinating  · Incontinence (not being able to control the release of urine)  · Swollen abdomen  Treatment  This condition is treated by inserting a tube (catheter) into the bladder to drain the urine. This provides immediate relief. The catheter may need to stay in place for a few days. The catheter has a balloon on the tip, which is inflated after insertion. This prevents the catheter from falling out.  Home care  · If you were given antibiotics, take them until they are used up, or your healthcare provider tells you to stop. It is important to finish the antibiotics even though you feel better. This is to make sure your infection has cleared.  · If a catheter was left in place, it is important to keep bacteria from getting into the collection bag. Do not disconnect the catheter from the collection bag.  · Use a leg band to secure the drainage tube, so it does not pull on the catheter. Drain the collection bag when it becomes full using the drain spout at the bottom of the bag.  · Do not pull on or try to remove your catheter.  This will injure your urethra. The catheter must be removed by a healthcare provider.  Follow-up care  Follow up with your healthcare provider, or as advised.  If a catheter was left in place, it can usually be removed within 3 to 7 days. Some conditions require the catheter to stay in longer. Your healthcare provider will tell you when to return to have the catheter removed.  When to seek medical advice  Call your healthcare provider right away if any of these occur:  · Fever of 100.4ºF (38ºC) or higher, or as directed by your healthcare provider  · Bladder or lower-abdominal pain or fullness  · Abdominal swelling, nausea, vomiting, or back pain  · Blood or urine leakage around the catheter  · Bloody urine coming from the catheter (if a new symptom)  · Weakness, dizziness, or fainting  · Confusion or change in usual level of alertness  · If a catheter was left in place, return if:  ¨ Catheter falls out  ¨ Catheter stops draining for 6 hours  Date Last Reviewed: 7/26/2015  © 7513-8548 [employer]. [address] [place] [address]. All rights reserved. This information is not intended as a substitute for professional medical care. Always follow your healthcare professional's instructions.        Prostate Problems and Related Urinary Symptoms    Many men have problems with the prostate at some time in their lives. The prostate gland is part of the male reproductive system. Its located just below the bladder. The prostate surrounds the urethra (the tube that carries urine and semen out of the body). The main function of the prostate gland is to add fluid to the semen. When problems occur in the prostate, the bladder and urethra are often affected as well. The most common prostate problems are described below.  BPH  BPH (benign prostatic hyperplasia) develops when changing hormone levels cause the prostate to grow larger. This often starts around age 50. Excess tissue can block the urethra,  making it harder for urine to flow. The enlarged prostate can also press on the bladder, so you may need to urinate more often. Other symptoms include straining during urination, a weak urine stream, urinating more at night, incontinence, dribbling at the end of urination, and feeling that the bladder isnt emptying all the way. Note that BPH is not cancer and does not cause cancer.  How BPH affects the bladder  Pushing to urinate through a narrowed urethra can cause the bladder walls to thicken or stretch out of shape. A stretched bladder may have problems emptying all the way. If urine stays in the bladder longer than it should, you may develop an infection or bladder stones. Also, the kidneys cant drain properly into a bladder that doesnt empty completely. This can lead to kidney failure. Pressure from urine buildup can also cause leaking of urine (called overflow incontinence).  Other prostate problems  · Prostatitis is an infection or inflammation that causes the prostate to become painful and swollen. The swelling narrows the urethra and can block the bladder neck. Prostatitis can cause a burning sensation during urination. You may also feel pressure or pain in the genital area. In some cases, prostatitis can cause fever and chills, and can make you very sick.  · Cancer occurs when abnormal cells form a tumor (a lump of cells that grow uncontrolled). Some tumors can be felt during a physical exam, others cant. Prostate cancer often causes no symptoms at all, especially in its early stages. Prostate symptoms are more likely to be caused by a problem that is NOT cancer.   Date Last Reviewed: 1/1/2017  © 7272-6884 [employer]. [address] [place] [address], [place] [address]. All rights reserved. This information is not intended as a substitute for professional medical care. Always follow your healthcare professional's instructions.

## 2019-02-14 ENCOUNTER — TELEPHONE (OUTPATIENT)
Dept: UROLOGY | Facility: CLINIC | Age: 79
End: 2019-02-14

## 2019-02-14 LAB — BACTERIA UR CULT: NORMAL

## 2019-02-14 RX ORDER — DOXYCYCLINE HYCLATE 100 MG
100 TABLET ORAL 2 TIMES DAILY
Qty: 42 TABLET | Refills: 0 | Status: SHIPPED | OUTPATIENT
Start: 2019-02-14 | End: 2019-03-07

## 2019-02-15 NOTE — TELEPHONE ENCOUNTER
Talked to patient and informed him of antibiotics called into pharmacy to treat uti. Pt voiced understanding.

## 2019-02-26 ENCOUNTER — CLINICAL SUPPORT (OUTPATIENT)
Dept: UROLOGY | Facility: CLINIC | Age: 79
End: 2019-02-26
Payer: MEDICARE

## 2019-02-26 ENCOUNTER — TELEPHONE (OUTPATIENT)
Dept: UROLOGY | Facility: CLINIC | Age: 79
End: 2019-02-26

## 2019-02-26 DIAGNOSIS — R39.14 BENIGN PROSTATIC HYPERPLASIA WITH INCOMPLETE BLADDER EMPTYING: Primary | ICD-10-CM

## 2019-02-26 DIAGNOSIS — N39.0 URINARY TRACT INFECTION WITHOUT HEMATURIA, SITE UNSPECIFIED: ICD-10-CM

## 2019-02-26 DIAGNOSIS — N40.1 BENIGN PROSTATIC HYPERPLASIA WITH INCOMPLETE BLADDER EMPTYING: Primary | ICD-10-CM

## 2019-02-26 LAB
BILIRUB SERPL-MCNC: ABNORMAL MG/DL
BLOOD URINE, POC: ABNORMAL
COLOR, POC UA: YELLOW
GLUCOSE UR QL STRIP: ABNORMAL
KETONES UR QL STRIP: ABNORMAL
LEUKOCYTE ESTERASE URINE, POC: ABNORMAL
NITRITE, POC UA: ABNORMAL
PH, POC UA: 5.5
POC RESIDUAL URINE VOLUME: 86 ML (ref 0–100)
PROTEIN, POC: 100
SPECIFIC GRAVITY, POC UA: 1.01
UROBILINOGEN, POC UA: 0.2

## 2019-02-26 PROCEDURE — 51798 US URINE CAPACITY MEASURE: CPT | Mod: S$GLB,,, | Performed by: NURSE PRACTITIONER

## 2019-02-26 PROCEDURE — 81002 POCT URINE DIPSTICK WITHOUT MICROSCOPE: ICD-10-PCS | Mod: S$GLB,,, | Performed by: NURSE PRACTITIONER

## 2019-02-26 PROCEDURE — 81002 URINALYSIS NONAUTO W/O SCOPE: CPT | Mod: S$GLB,,, | Performed by: NURSE PRACTITIONER

## 2019-02-26 PROCEDURE — 51798 POCT BLADDER SCAN: ICD-10-PCS | Mod: S$GLB,,, | Performed by: NURSE PRACTITIONER

## 2019-02-26 NOTE — PROGRESS NOTES
Patient arrived to the office for pvr recheck.  Patient voided, giving a specimen for dipstick, and pvr checked.  Residual at 86ml.

## 2019-02-26 NOTE — TELEPHONE ENCOUNTER
Called patient to inform him to continue taking medication as prescribed and scheduled him to f/u on 3/26.

## 2019-02-26 NOTE — TELEPHONE ENCOUNTER
Call the patient and advise him to keep taking the Flomax and Finasteride as was previously instructed to him.  Please schedule patient to see me back for routine recheck in one month.  Thanks.

## 2019-03-08 RX ORDER — DOXYCYCLINE HYCLATE 100 MG
TABLET ORAL
Qty: 42 TABLET | Refills: 0 | OUTPATIENT
Start: 2019-03-08

## 2019-03-26 ENCOUNTER — OFFICE VISIT (OUTPATIENT)
Dept: UROLOGY | Facility: CLINIC | Age: 79
End: 2019-03-26
Payer: MEDICARE

## 2019-03-26 VITALS
WEIGHT: 216.69 LBS | TEMPERATURE: 98 F | HEART RATE: 80 BPM | RESPIRATION RATE: 18 BRPM | SYSTOLIC BLOOD PRESSURE: 129 MMHG | DIASTOLIC BLOOD PRESSURE: 70 MMHG | BODY MASS INDEX: 30.34 KG/M2 | HEIGHT: 71 IN

## 2019-03-26 DIAGNOSIS — N40.1 BENIGN PROSTATIC HYPERPLASIA WITH INCOMPLETE BLADDER EMPTYING: ICD-10-CM

## 2019-03-26 DIAGNOSIS — N39.0 URINARY TRACT INFECTION WITHOUT HEMATURIA, SITE UNSPECIFIED: Primary | ICD-10-CM

## 2019-03-26 DIAGNOSIS — R39.14 BENIGN PROSTATIC HYPERPLASIA WITH INCOMPLETE BLADDER EMPTYING: ICD-10-CM

## 2019-03-26 LAB
BILIRUB SERPL-MCNC: ABNORMAL MG/DL
BLOOD URINE, POC: ABNORMAL
COLOR, POC UA: YELLOW
GLUCOSE UR QL STRIP: ABNORMAL
KETONES UR QL STRIP: ABNORMAL
LEUKOCYTE ESTERASE URINE, POC: ABNORMAL
NITRITE, POC UA: ABNORMAL
PH, POC UA: 5.5
POC RESIDUAL URINE VOLUME: 176 ML (ref 0–100)
PROTEIN, POC: 100
SPECIFIC GRAVITY, POC UA: 1.02
UROBILINOGEN, POC UA: 0.2

## 2019-03-26 PROCEDURE — 1101F PT FALLS ASSESS-DOCD LE1/YR: CPT | Mod: CPTII,S$GLB,, | Performed by: NURSE PRACTITIONER

## 2019-03-26 PROCEDURE — 99999 PR PBB SHADOW E&M-EST. PATIENT-LVL V: ICD-10-PCS | Mod: PBBFAC,,, | Performed by: NURSE PRACTITIONER

## 2019-03-26 PROCEDURE — 87077 CULTURE AEROBIC IDENTIFY: CPT

## 2019-03-26 PROCEDURE — 51798 PR MEAS,POST-VOID RES,US,NON-IMAGING: ICD-10-PCS | Mod: S$GLB,,, | Performed by: NURSE PRACTITIONER

## 2019-03-26 PROCEDURE — 99214 OFFICE O/P EST MOD 30 MIN: CPT | Mod: 25,S$GLB,, | Performed by: NURSE PRACTITIONER

## 2019-03-26 PROCEDURE — 87088 URINE BACTERIA CULTURE: CPT

## 2019-03-26 PROCEDURE — 87086 URINE CULTURE/COLONY COUNT: CPT

## 2019-03-26 PROCEDURE — 81002 POCT URINE DIPSTICK WITHOUT MICROSCOPE: ICD-10-PCS | Mod: S$GLB,,, | Performed by: NURSE PRACTITIONER

## 2019-03-26 PROCEDURE — 99214 PR OFFICE/OUTPT VISIT, EST, LEVL IV, 30-39 MIN: ICD-10-PCS | Mod: 25,S$GLB,, | Performed by: NURSE PRACTITIONER

## 2019-03-26 PROCEDURE — 51798 US URINE CAPACITY MEASURE: CPT | Mod: S$GLB,,, | Performed by: NURSE PRACTITIONER

## 2019-03-26 PROCEDURE — 87186 SC STD MICRODIL/AGAR DIL: CPT

## 2019-03-26 PROCEDURE — 99999 PR PBB SHADOW E&M-EST. PATIENT-LVL V: CPT | Mod: PBBFAC,,, | Performed by: NURSE PRACTITIONER

## 2019-03-26 PROCEDURE — 1101F PR PT FALLS ASSESS DOC 0-1 FALLS W/OUT INJ PAST YR: ICD-10-PCS | Mod: CPTII,S$GLB,, | Performed by: NURSE PRACTITIONER

## 2019-03-26 PROCEDURE — 81002 URINALYSIS NONAUTO W/O SCOPE: CPT | Mod: S$GLB,,, | Performed by: NURSE PRACTITIONER

## 2019-03-26 RX ORDER — TAMSULOSIN HYDROCHLORIDE 0.4 MG/1
0.4 CAPSULE ORAL
Qty: 90 CAPSULE | Refills: 3 | Status: SHIPPED | OUTPATIENT
Start: 2019-03-26 | End: 2020-01-01

## 2019-03-26 RX ORDER — FINASTERIDE 5 MG/1
5 TABLET, FILM COATED ORAL DAILY
Qty: 90 TABLET | Refills: 3 | Status: SHIPPED | OUTPATIENT
Start: 2019-03-26 | End: 2020-01-01

## 2019-03-26 RX ORDER — MUPIROCIN 20 MG/G
OINTMENT TOPICAL 2 TIMES DAILY
Refills: 3 | COMMUNITY
Start: 2019-02-28 | End: 2019-04-11

## 2019-03-26 RX ORDER — TRIAMCINOLONE ACETONIDE 1 MG/G
CREAM TOPICAL
Refills: 3 | COMMUNITY
Start: 2019-03-14 | End: 2019-04-11

## 2019-03-26 RX ORDER — ATORVASTATIN CALCIUM 40 MG/1
40 TABLET, FILM COATED ORAL DAILY
COMMUNITY
End: 2019-04-11

## 2019-03-26 NOTE — PATIENT INSTRUCTIONS
Prostate Problems and Related Urinary Symptoms    Many men have problems with the prostate at some time in their lives. The prostate gland is part of the male reproductive system. Its located just below the bladder. The prostate surrounds the urethra (the tube that carries urine and semen out of the body). The main function of the prostate gland is to add fluid to the semen. When problems occur in the prostate, the bladder and urethra are often affected as well. The most common prostate problems are described below.  BPH  BPH (benign prostatic hyperplasia) develops when changing hormone levels cause the prostate to grow larger. This often starts around age 50. Excess tissue can block the urethra, making it harder for urine to flow. The enlarged prostate can also press on the bladder, so you may need to urinate more often. Other symptoms include straining during urination, a weak urine stream, urinating more at night, incontinence, dribbling at the end of urination, and feeling that the bladder isnt emptying all the way. Note that BPH is not cancer and does not cause cancer.  How BPH affects the bladder  Pushing to urinate through a narrowed urethra can cause the bladder walls to thicken or stretch out of shape. A stretched bladder may have problems emptying all the way. If urine stays in the bladder longer than it should, you may develop an infection or bladder stones. Also, the kidneys cant drain properly into a bladder that doesnt empty completely. This can lead to kidney failure. Pressure from urine buildup can also cause leaking of urine (called overflow incontinence).  Other prostate problems  · Prostatitis is an infection or inflammation that causes the prostate to become painful and swollen. The swelling narrows the urethra and can block the bladder neck. Prostatitis can cause a burning sensation during urination. You may also feel pressure or pain in the genital area. In some cases, prostatitis can  cause fever and chills, and can make you very sick.  · Cancer occurs when abnormal cells form a tumor (a lump of cells that grow uncontrolled). Some tumors can be felt during a physical exam, others cant. Prostate cancer often causes no symptoms at all, especially in its early stages. Prostate symptoms are more likely to be caused by a problem that is NOT cancer.   Date Last Reviewed: 1/1/2017  © 6730-6569 [employer]. [address] [place] [address], [place] [address]. All rights reserved. This information is not intended as a substitute for professional medical care. Always follow your healthcare professional's instructions.
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
## 2019-03-29 LAB — BACTERIA UR CULT: NORMAL

## 2019-03-29 RX ORDER — NITROFURANTOIN (MACROCRYSTALS) 100 MG/1
100 CAPSULE ORAL 2 TIMES DAILY
Qty: 28 CAPSULE | Refills: 0 | Status: SHIPPED | OUTPATIENT
Start: 2019-03-29 | End: 2019-04-12

## 2019-04-10 ENCOUNTER — TELEPHONE (OUTPATIENT)
Dept: RHEUMATOLOGY | Facility: CLINIC | Age: 79
End: 2019-04-10

## 2019-04-10 NOTE — TELEPHONE ENCOUNTER
Called patient to confirm appointment for tomorrow 4/11/19. I couldn't leave a message: voicemail has not been set up

## 2019-04-11 ENCOUNTER — OFFICE VISIT (OUTPATIENT)
Dept: RHEUMATOLOGY | Facility: CLINIC | Age: 79
End: 2019-04-11
Payer: MEDICARE

## 2019-04-11 VITALS
DIASTOLIC BLOOD PRESSURE: 65 MMHG | BODY MASS INDEX: 29.83 KG/M2 | SYSTOLIC BLOOD PRESSURE: 109 MMHG | WEIGHT: 213.88 LBS

## 2019-04-11 DIAGNOSIS — M54.50 LOW BACK PAIN, NON-SPECIFIC: ICD-10-CM

## 2019-04-11 PROCEDURE — 1101F PR PT FALLS ASSESS DOC 0-1 FALLS W/OUT INJ PAST YR: ICD-10-PCS | Mod: ,,, | Performed by: INTERNAL MEDICINE

## 2019-04-11 PROCEDURE — 99203 OFFICE O/P NEW LOW 30 MIN: CPT | Mod: ,,, | Performed by: INTERNAL MEDICINE

## 2019-04-11 PROCEDURE — 99203 PR OFFICE/OUTPT VISIT, NEW, LEVL III, 30-44 MIN: ICD-10-PCS | Mod: ,,, | Performed by: INTERNAL MEDICINE

## 2019-04-11 PROCEDURE — 1101F PT FALLS ASSESS-DOCD LE1/YR: CPT | Mod: ,,, | Performed by: INTERNAL MEDICINE

## 2019-04-11 RX ORDER — HYDROXYZINE HYDROCHLORIDE 25 MG/1
25 TABLET, FILM COATED ORAL 2 TIMES DAILY
Status: ON HOLD | COMMUNITY
End: 2020-01-01 | Stop reason: HOSPADM

## 2019-04-11 RX ORDER — AMLODIPINE BESYLATE 10 MG/1
10 TABLET ORAL DAILY
Status: ON HOLD | COMMUNITY
End: 2020-01-01 | Stop reason: HOSPADM

## 2019-04-11 RX ORDER — LIDOCAINE 50 MG/G
PATCH TOPICAL
Qty: 15 PATCH | Refills: 1 | Status: ON HOLD | OUTPATIENT
Start: 2019-04-11 | End: 2020-01-01 | Stop reason: HOSPADM
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
## 2019-04-16 ENCOUNTER — TELEPHONE (OUTPATIENT)
Dept: RHEUMATOLOGY | Facility: CLINIC | Age: 79
End: 2019-04-16

## 2019-05-07 NOTE — PROGRESS NOTES
OFFICE NOTE    CHIEF COMPLAINT:  BPH, recent urinary tract infections, renal cyst.    HISTORY OF PRESENT ILLNESS:  This 78-year-old male returns for routine recheck.    He was recently seen by Sonal Riojas on 03/26/2019 with lower tract symptoms,   found to have urinary tract infection and treated with Macrobid that he has   completed and he states he has no more dysuria and symptoms have resolved.  He   does have a history of BPH for which he continues to take Proscar 5 mg p.o.   daily and Flomax 0.4 mg p.o. daily, but he has noticed that he is still having   some slowing of his urinary stream in spite of this treatment plan.  We did   discuss consideration for changing some of his medications.    The patient did mention that he states he underwent some  procedure several   years ago at [facility], which he did not have records of it and he states he does   not remember specifically what it was.  We will need to obtain records related   to this.    He states his primary care physician is Dr. Russell.    PHYSICAL EXAMINATION:  ABDOMEN:  Soft, benign and nontender.  No masses.  No hernias.  No organomegaly.  EXTERNAL GENITAL:  Normal phallus with adequate meatus.  Testes descended and   feel normal.  No scrotal masses.  RECTAL:  A 25 g smooth prostate with no nodules.  Normal sphincter tone.    A bladder scan revealed a 100 mL of residual urine, which is significantly   higher than it has been in the past.    His most recent PSA was 1.6 on 04/02/2019.    UA did reveal many wbc's, pH 5.0.    FINAL IMPRESSION:  BPH with lower urinary tract symptoms, recurrent urinary   tract infections, pyuria.    RECOMMENDATION:  We will change from Flomax to Uroxatral 10 mg p.o. daily.    Urine will be then sent for culture and sensitivity.  We also discussed   obtaining a copy of records from the procedures he had at [facility] in the Urology   Department.  Otherwise, routine recheck in four to six weeks and observe the   response to  the Uroxatral and recheck his postvoid residual.      MD/IN  dd: 05/07/2019 18:09:43 (CDT)  td: 05/08/2019 15:44:04 (CDT)  Doc ID   #[id number]  Job ID #[id number]    CC:

## 2019-05-14 NOTE — TELEPHONE ENCOUNTER
Call placed to given urine culture results and recommendation, no answer, unable to leave message.

## 2019-05-14 NOTE — TELEPHONE ENCOUNTER
----- Message from Ange Chacon MD sent at 5/11/2019  1:11 PM CDT -----  C&S - e.coli    Rec: Bactrim DS po bid x 2 weeks           Keep f/u appt

## 2019-05-20 NOTE — PROGRESS NOTES
[facility] RHEUMATOLOGY            PROGRESS NOTE      Subjective:       Patient ID:   NAME: Asif Velez : 1940     78 y.o. male    Referring Doc: No ref. provider found  Other Physicians:    Chief Complaint:  Low back pain, non-specific      History of Present Illness:     Patient returns today for a regularly scheduled follow-up visit for osteoarthritis      The patient is feeling better regarding low back pain since physical therapy and lidocaine patches. No paresthesias. No GI complaints.            ROS:   GEN:  No  fever, night sweats . weight is stable   + fatigue  SKIN: no rashes, no bruising, no ulcerations, no Raynaud's  HEENT: no HA's, No visual changes, no mucosal ulcers, no sicca symptoms,  CV:   no CP, SOB, PND, TUBBS, no orthopnea, no palpitations  PULM: normal with no SOB, cough, hemoptysis, sputum or pleuritic pain  GI:  no abdominal pain, nausea, vomiting, constipation, diarrhea, melanotic stools, BRBPR, hematemesis, no dysphagia  :   no dysuria  NEURO: no paresthesias, headaches, visual disturbances, muscle weakness  MUSCULOSKELETAL:no joint swelling, prolonged AM stiffness, + back pain, no muscle pain  Allergies:  Review of patient's allergies indicates:  No Known Allergies    Medications:    Current Outpatient Medications:     alfuzosin (UROXATRAL) 10 mg Tb24, Take 1 tablet (10 mg total) by mouth daily with breakfast., Disp: 30 tablet, Rfl: 11    amLODIPine (NORVASC) 10 MG tablet, Take 10 mg by mouth once daily., Disp: , Rfl:     aspirin 81 MG Chew, Take 325 mg by mouth. , Disp: , Rfl:     carvedilol (COREG) 25 MG tablet, Take by mouth., Disp: , Rfl:     finasteride (PROSCAR) 5 mg tablet, Take 1 tablet (5 mg total) by mouth once daily., Disp: 90 tablet, Rfl: 3    gabapentin (NEURONTIN) 100 MG capsule, Take by mouth., Disp: , Rfl:     hydrALAZINE (APRESOLINE) 10 MG tablet, Take 10 mg by mouth 2 (two) times daily., Disp: , Rfl:     hydrOXYzine HCl (ATARAX) 25 MG tablet, hydroxyzine  HCl 25 mg tablet  TAKE 1-2 TABLETS BY MOUTH AT BEDTIME AS NEEDED FOR ITCH, Disp: , Rfl:     lidocaine (LIDODERM) 5 %, Remove & Discard patch within 12 hours or as directed by MDOne patch a day prn.Discard after 12 hrs, Disp: 15 patch, Rfl: 1    lisinopril (PRINIVIL,ZESTRIL) 40 MG tablet, Take by mouth., Disp: , Rfl:     magnesium chloride (SLOW-MAG ORAL), Take by mouth., Disp: , Rfl:     magnesium oxide (MAG-OX) 400 mg tablet, Take 400 mg by mouth once daily., Disp: , Rfl:     multivitamin with iron Tab, Take by mouth., Disp: , Rfl:     omeprazole 20 mg TbEC, Take by mouth., Disp: , Rfl:     sulfamethoxazole-trimethoprim 800-160mg (BACTRIM DS) 800-160 mg Tab, Take 1 tablet by mouth 2 (two) times daily., Disp: 30 tablet, Rfl: 0    sulfamethoxazole-trimethoprim 800-160mg (BACTRIM DS) 800-160 mg Tab, Take 1 tablet by mouth 2 (two) times daily., Disp: 30 tablet, Rfl: 0    tamsulosin (FLOMAX) 0.4 mg Cap, Take 1 capsule (0.4 mg total) by mouth after dinner., Disp: 90 capsule, Rfl: 3    PMHx/PSHx Updates:        Objective:     Vitals:  Blood pressure 131/68, weight 98.9 kg (218 lb).    Physical Examination:   GEN: no apparent distress, comfortable; AAOx3  SKIN: no rashes,no ulceration, no Raynaud's, no petechiae, no SQ nodules,  HEAD: normal  EYES: no pallor, no icterus  NECK: no masses, thyroid normal, trachea midline, no LAD/LN's, supple  CV: RRR with no murmur; l S1 and S2 reg. ,no gallop no rubs,   CHEST: Normal respiratory effort; CTAB; normal breath sounds; no wheeze or crackles  MUSC/Skeletal: ROM normal; no crepitus; joints without synovitis,  no deformities  No joint swelling or tenderness of PIP, MCP, wrist, elbow, shoulder, or knee joints  EXTREM: no clubbing, cyanosis, no edema,normal  pulses   NEURO: grossly intact; motor WNL; AAOx3; ,  PSYCH: normal mood, affect and behavior  LYMPH: normal cervical, supraclavicular          Labs:   Lab Results   Component Value Date    WBC 6.8 01/03/2019    HGB 10.9  (L) 01/03/2019    HCT 35.3 (L) 01/03/2019    MCV 87 01/03/2019     01/03/2019    CMP  @LASTLAB(NA,K,CL,CO2,GLU,BUN,Creatinine,Calcium,PROT,Albumin,Bilitot,Alkphos,AST,ALT,CRP,ESR,RF,CCP,MAXINE,SSA,CPK,uric acid) )@  I have reviewed all available lab results and radiology reports.    Radiology/Diagnostic Studies:    Lumbar spine films showing mild DJD.    Assessment/Plan:   (1) 78 y.o. male with diagnosis of osteoarthritis.  Better with lidocaine patches.  Again reminded him not to take anti-inflammatories over-the-counter due to kidney disease              Discussion:     I have explained all of the above in detail and the patient understands all of the current recommendation(s). I have answered all questions to the best of my ability and to their complete satisfaction.       The patient is to continue with the current management plan         RTC in 4-6 months    Electronically signed by Jose Rojas MD

## 2019-06-18 NOTE — PROGRESS NOTES
CHIEF COMPLAINT:  BPH, recent urinary tract infections, renal cyst.    HISTORY OF PRESENT ILLNESS:  This is a 78-year-old male who returns for routine   recheck.  At his last visit, the Flomax was changed to Uroxatral for management   of his BPH and he states on today's visit, he has noticed improvement,   especially with reduced nocturia.  He does have a history of urinary tract   infections for which he was treated with Macrobid that he has completed when we   last saw him a month ago and overall on today's visit, he denies any specific   complaints.  He also has been noted to have renal cyst that has been stable.    Bladder scan did reveal 54 mL of postvoid residual, which is an improvement   compared to 100 mL at the last visit.    His last PSA was 1.6 on 04/02/2019.    UA today did reveal a trace of blood, few rbc's, and wbc's, pH 5.5.    FINAL IMPRESSION:  BPH, microscopic hematuria, pyuria, recent urinary tract   infections.    RECOMMENDATIONS:  Urine for C and S and cytology.  Otherwise, continue on   Uroxatral 10 mg p.o. daily and Proscar 5 mg p.o. daily and we will contact him   with urine test results and consider further workup pending the findings.      FRANCESCO  dd: 06/18/2019 15:18:37 (CDT)  td: 06/18/2019 23:08:05 (CDT)  Doc ID   #[id number]  Job ID #[id number]    CC:

## 2019-06-21 NOTE — TELEPHONE ENCOUNTER
----- Message from Ange Chacon MD sent at 6/21/2019 11:48 AM CDT -----  Urine culture and sensitivity - E coli  Urine cytology is pending    Rec:  Ceftin 5 mg p.o. b.i.d. for 2 weeks            Recheck 4-6 weeks

## 2019-06-21 NOTE — TELEPHONE ENCOUNTER
Call place to give urine cx results and inform of antibiotic. No answer, phone just rings, unable to leave message.

## 2019-08-05 NOTE — PROGRESS NOTES
[facility]   Hematology/Oncology          PROGRESS NOTE      Subjective:       Patient ID:   NAME: Asif Velez : 1940     78 y.o. male    Referring Doc: No ref. provider found  Other Physicians: Mario Baugh    Chief Complaint:  Anemia f/u    History of Present Illness:     Patient returns today for a regularly scheduled follow-up visit.  The patient is here with his wife. He is doing ok .he is here by himself. He saw his Dr Chacon with  on 2019, and Dr CONNIE Rojas with rheum on 2019.  He denies any CP, SOB, HA's or N/V . The prior rash issues are now under control. He has some fatigue.           ROS:   GEN: normal without any fever, night sweats or weight loss  HEENT: normal with no HA's, sore throat, stiff neck, changes in vision  CV: normal with no CP, SOB, PND, TUBBS or orthopnea  PULM: normal with no SOB, cough, hemoptysis, sputum or pleuritic pain  GI: normal with no abdominal pain, nausea, vomiting, constipation, diarrhea, melanotic stools, BRBPR, or hematemesis  : normal with no hematuria, dysuria  BREAST: normal with no mass, discharge, pain  SKIN: normal with no  erythema, bruising, or swelling; rash LUE resolved    Allergies:  Review of patient's allergies indicates:  No Known Allergies    Medications:    Current Outpatient Medications:     alfuzosin (UROXATRAL) 10 mg Tb24, Take 1 tablet (10 mg total) by mouth daily with breakfast., Disp: 30 tablet, Rfl: 11    amLODIPine (NORVASC) 10 MG tablet, Take 10 mg by mouth once daily., Disp: , Rfl:     aspirin 81 MG Chew, Take 325 mg by mouth. , Disp: , Rfl:     carvedilol (COREG) 25 MG tablet, Take by mouth., Disp: , Rfl:     cefUROXime (CEFTIN) 500 MG tablet, Take 1 tablet (500 mg total) by mouth 2 (two) times daily., Disp: 30 tablet, Rfl: 0    finasteride (PROSCAR) 5 mg tablet, Take 1 tablet (5 mg total) by mouth once daily., Disp: 90 tablet, Rfl: 3    gabapentin (NEURONTIN) 100 MG capsule, Take by mouth., Disp: , Rfl:     hydrALAZINE  (APRESOLINE) 10 MG tablet, Take 10 mg by mouth 2 (two) times daily., Disp: , Rfl:     hydrOXYzine HCl (ATARAX) 25 MG tablet, hydroxyzine HCl 25 mg tablet  TAKE 1-2 TABLETS BY MOUTH AT BEDTIME AS NEEDED FOR ITCH, Disp: , Rfl:     lidocaine (LIDODERM) 5 %, Remove & Discard patch within 12 hours or as directed by MDOne patch a day prn.Discard after 12 hrs, Disp: 15 patch, Rfl: 1    lisinopril (PRINIVIL,ZESTRIL) 40 MG tablet, Take by mouth., Disp: , Rfl:     magnesium chloride (SLOW-MAG ORAL), Take by mouth., Disp: , Rfl:     magnesium oxide (MAG-OX) 400 mg tablet, Take 400 mg by mouth once daily., Disp: , Rfl:     multivitamin with iron Tab, Take by mouth., Disp: , Rfl:     omeprazole 20 mg TbEC, Take by mouth., Disp: , Rfl:     sulfamethoxazole-trimethoprim 800-160mg (BACTRIM DS) 800-160 mg Tab, Take 1 tablet by mouth 2 (two) times daily., Disp: 30 tablet, Rfl: 0    sulfamethoxazole-trimethoprim 800-160mg (BACTRIM DS) 800-160 mg Tab, Take 1 tablet by mouth 2 (two) times daily., Disp: 30 tablet, Rfl: 0    tamsulosin (FLOMAX) 0.4 mg Cap, Take 1 capsule (0.4 mg total) by mouth after dinner., Disp: 90 capsule, Rfl: 3    VASCEPA 1 gram Cap, , Disp: , Rfl:     PMHx/PSHx Updates:  See patient's last visit with me on 2/6/2019.  See H&P on 1/3/2017      Pathology:  No matching staging information was found for the patient.          Objective:     Vitals:  Blood pressure 130/70, pulse 71, temperature 98.1 °F (36.7 °C), resp. rate 18, weight 99.8 kg (220 lb).    Physical Examination:   GEN: no apparent distress, comfortable; AAOx3  HEAD: atraumatic and normocephalic  EYES: no pallor, no icterus, PERRLA  ENT: OMM, no pharyngeal erythema, external ears WNL; no nasal discharge; no thrush  NECK: no masses, thyroid normal, trachea midline, no LAD/LN's, supple  CV: RRR with no murmur; normal pulse; normal S1 and S2; no pedal edema  CHEST: Normal respiratory effort; CTAB; normal breath sounds; no wheeze or crackles  ABDOM:  nontender and nondistended; soft; normal bowel sounds; no rebound/guarding  MUSC/Skeletal: positive arthropathy; uses cane  EXTREM: no clubbing, cyanosis, inflammation or swelling  SKIN: no  lesions, ulcers, petechiae or subcutaneous nodules; rash improving on LUE forearm  : no alvarez  NEURO: grossly intact; motor/sensory WNL; AAOx3; no tremors  PSYCH: normal mood, affect and behavior  LYMPH: normal cervical, supraclavicular, axillary and groin LN's            Labs:     7/31/2019    Lab Results   Component Value Date    WBC 6.3 07/31/2019    HGB 12.0 (L) 07/31/2019    HCT 36.4 (L) 07/31/2019    MCV 85 07/31/2019     07/31/2019     CMP  Sodium   Date Value Ref Range Status   07/31/2019 146 (H) 134 - 144 mmol/L Final     Potassium   Date Value Ref Range Status   07/31/2019 4.0 3.5 - 5.2 mmol/L Final     Chloride   Date Value Ref Range Status   07/31/2019 109 (H) 96 - 106 mmol/L Final     CO2   Date Value Ref Range Status   07/31/2019 20 20 - 29 mmol/L Final     Glucose   Date Value Ref Range Status   07/31/2019 108 (H) 65 - 99 mg/dL Final     BUN, Bld   Date Value Ref Range Status   07/31/2019 16 8 - 27 mg/dL Final     Creatinine   Date Value Ref Range Status   07/31/2019 1.49 (H) 0.76 - 1.27 mg/dL Final     Calcium   Date Value Ref Range Status   07/31/2019 9.1 8.6 - 10.2 mg/dL Final     Total Protein   Date Value Ref Range Status   07/31/2019 6.9 6.0 - 8.5 g/dL Final     Albumin   Date Value Ref Range Status   07/31/2019 4.2 3.5 - 4.8 g/dL Final     Total Bilirubin   Date Value Ref Range Status   07/31/2019 0.3 0.0 - 1.2 mg/dL Final     Alkaline Phosphatase   Date Value Ref Range Status   07/31/2019 68 39 - 117 IU/L Final     AST   Date Value Ref Range Status   07/31/2019 22 0 - 40 IU/L Final     ALT   Date Value Ref Range Status   07/31/2019 15 0 - 44 IU/L Final     eGFR if non    Date Value Ref Range Status   07/31/2019 44 (L) >59 mL/min/1.73 Final     Lab Results   Component Value Date     ALT 15 07/31/2019    AST 22 07/31/2019    ALKPHOS 68 07/31/2019    BILITOT 0.3 07/31/2019 7/31/2019    Lab Results   Component Value Date    IRON 71 07/31/2019    TIBC 303 07/31/2019    FERRITIN 35 07/31/2019           I have reviewed all available lab results and radiology reports.    Radiology/Diagnostic Studies:    CT abdom 6/14/2018    Assessment/Plan:   (1) 78 y.o. male with diagnosis of multifactorial anemia issues that has NCNC parameters  - underlying anemia of chronic disorders and anemia of chronic renal disease  - he had [facility] with Dr Ever Pizarro  - hgb stable at 12.0      (2) CAD, CHF and HTN - followed by Dr Baugh    (3) CRI - stage III - referred to Dr Martin with Neph but he still has not seen her as of yet    1. Anemia due to multiple mechanisms     2. Anemia, chronic disease     3. Anemia, chronic renal failure, stage 3 (moderate)     4. Anemia, unspecified type           PLAN:  1. Continue to monitor labs q 3 months  2. F/u with PCP, , Card and Neph  3. RTC 6 months  4. Fax note to Lup[facility] Russell [facility], [facility]                       Discussion:     I have explained all of the above in detail and the patient understands all of the current recommendation(s). I have answered all of their questions to the best of my ability and to their complete satisfaction.   The patient is to continue with the current management plan.    RTC in  6 months          Electronically signed by Naseem Vazquez MD

## 2020-01-01 ENCOUNTER — HOSPITAL ENCOUNTER (INPATIENT)
Facility: HOSPITAL | Age: 80
LOS: 14 days | DRG: 177 | End: 2020-04-18
Attending: EMERGENCY MEDICINE | Admitting: INTERNAL MEDICINE
Payer: MEDICARE

## 2020-01-01 ENCOUNTER — OFFICE VISIT (OUTPATIENT)
Dept: HEMATOLOGY/ONCOLOGY | Facility: CLINIC | Age: 80
End: 2020-01-01
Payer: MEDICARE

## 2020-01-01 ENCOUNTER — TELEPHONE (OUTPATIENT)
Dept: ADMINISTRATIVE | Facility: HOSPITAL | Age: 80
End: 2020-01-01

## 2020-01-01 ENCOUNTER — HOSPITAL ENCOUNTER (EMERGENCY)
Facility: HOSPITAL | Age: 80
Discharge: HOME | End: 2020-03-30
Attending: EMERGENCY MEDICINE
Payer: MEDICARE

## 2020-01-01 VITALS
TEMPERATURE: 98 F | WEIGHT: 220 LBS | HEIGHT: 71 IN | SYSTOLIC BLOOD PRESSURE: 127 MMHG | BODY MASS INDEX: 30.8 KG/M2 | RESPIRATION RATE: 18 BRPM | HEART RATE: 80 BPM | OXYGEN SATURATION: 96 % | DIASTOLIC BLOOD PRESSURE: 60 MMHG

## 2020-01-01 VITALS
WEIGHT: 194.69 LBS | RESPIRATION RATE: 20 BRPM | SYSTOLIC BLOOD PRESSURE: 90 MMHG | HEIGHT: 71 IN | BODY MASS INDEX: 27.26 KG/M2 | HEART RATE: 102 BPM | DIASTOLIC BLOOD PRESSURE: 52 MMHG | TEMPERATURE: 98 F | OXYGEN SATURATION: 93 %

## 2020-01-01 VITALS
TEMPERATURE: 99 F | HEART RATE: 70 BPM | WEIGHT: 217.19 LBS | RESPIRATION RATE: 18 BRPM | BODY MASS INDEX: 30.29 KG/M2 | DIASTOLIC BLOOD PRESSURE: 66 MMHG | SYSTOLIC BLOOD PRESSURE: 132 MMHG

## 2020-01-01 DIAGNOSIS — R05.9 COUGH: ICD-10-CM

## 2020-01-01 DIAGNOSIS — D63.8 ANEMIA, CHRONIC DISEASE: ICD-10-CM

## 2020-01-01 DIAGNOSIS — D64.9 ANEMIA, UNSPECIFIED TYPE: Primary | ICD-10-CM

## 2020-01-01 DIAGNOSIS — N18.30 ANEMIA, CHRONIC RENAL FAILURE, STAGE 3 (MODERATE): ICD-10-CM

## 2020-01-01 DIAGNOSIS — Z20.822 SUSPECTED COVID-19 VIRUS INFECTION: Primary | ICD-10-CM

## 2020-01-01 DIAGNOSIS — J18.9 PNEUMONIA OF BOTH LOWER LOBES DUE TO INFECTIOUS ORGANISM: Primary | ICD-10-CM

## 2020-01-01 DIAGNOSIS — Z20.822 SUSPECTED COVID-19 VIRUS INFECTION: ICD-10-CM

## 2020-01-01 DIAGNOSIS — D63.1 ANEMIA, CHRONIC RENAL FAILURE, STAGE 3 (MODERATE): ICD-10-CM

## 2020-01-01 DIAGNOSIS — D64.89 ANEMIA DUE TO MULTIPLE MECHANISMS: ICD-10-CM

## 2020-01-01 DIAGNOSIS — R94.31 QT PROLONGATION: ICD-10-CM

## 2020-01-01 DIAGNOSIS — R06.02 SHORTNESS OF BREATH: ICD-10-CM

## 2020-01-01 LAB
ALBUMIN SERPL BCP-MCNC: 2.8 G/DL (ref 3.5–5.2)
ALBUMIN SERPL BCP-MCNC: 2.9 G/DL (ref 3.5–5.2)
ALBUMIN SERPL BCP-MCNC: 2.9 G/DL (ref 3.5–5.2)
ALBUMIN SERPL BCP-MCNC: 3 G/DL (ref 3.5–5.2)
ALBUMIN SERPL BCP-MCNC: 3.4 G/DL (ref 3.5–5.2)
ALLENS TEST: ABNORMAL
ALP SERPL-CCNC: 101 U/L (ref 55–135)
ALP SERPL-CCNC: 104 U/L (ref 55–135)
ALP SERPL-CCNC: 106 U/L (ref 55–135)
ALP SERPL-CCNC: 106 U/L (ref 55–135)
ALP SERPL-CCNC: 67 U/L (ref 55–135)
ALP SERPL-CCNC: 78 U/L (ref 55–135)
ALP SERPL-CCNC: 83 U/L (ref 55–135)
ALP SERPL-CCNC: 90 U/L (ref 55–135)
ALP SERPL-CCNC: 94 U/L (ref 55–135)
ALT SERPL W/O P-5'-P-CCNC: 16 U/L (ref 10–44)
ALT SERPL W/O P-5'-P-CCNC: 36 U/L (ref 10–44)
ALT SERPL W/O P-5'-P-CCNC: 36 U/L (ref 10–44)
ALT SERPL W/O P-5'-P-CCNC: 37 U/L (ref 10–44)
ALT SERPL W/O P-5'-P-CCNC: 39 U/L (ref 10–44)
ALT SERPL W/O P-5'-P-CCNC: 43 U/L (ref 10–44)
ALT SERPL W/O P-5'-P-CCNC: 47 U/L (ref 10–44)
ALT SERPL W/O P-5'-P-CCNC: 50 U/L (ref 10–44)
ALT SERPL W/O P-5'-P-CCNC: 55 U/L (ref 10–44)
AMMONIA PLAS-SCNC: 14 UMOL/L (ref 10–50)
ANION GAP SERPL CALC-SCNC: 10 MMOL/L (ref 8–16)
ANION GAP SERPL CALC-SCNC: 12 MMOL/L (ref 8–16)
ANION GAP SERPL CALC-SCNC: 13 MMOL/L (ref 8–16)
ANION GAP SERPL CALC-SCNC: 13 MMOL/L (ref 8–16)
ANION GAP SERPL CALC-SCNC: 14 MMOL/L (ref 8–16)
ANION GAP SERPL CALC-SCNC: 15 MMOL/L (ref 8–16)
ANION GAP SERPL CALC-SCNC: 9 MMOL/L (ref 8–16)
AST SERPL-CCNC: 100 U/L (ref 10–40)
AST SERPL-CCNC: 101 U/L (ref 10–40)
AST SERPL-CCNC: 38 U/L (ref 10–40)
AST SERPL-CCNC: 40 U/L (ref 10–40)
AST SERPL-CCNC: 44 U/L (ref 10–40)
AST SERPL-CCNC: 47 U/L (ref 10–40)
AST SERPL-CCNC: 56 U/L (ref 10–40)
AST SERPL-CCNC: 62 U/L (ref 10–40)
AST SERPL-CCNC: 93 U/L (ref 10–40)
B-OH-BUTYR BLD STRIP-SCNC: 0.4 MMOL/L (ref 0–0.5)
BACTERIA BLD CULT: NORMAL
BACTERIA UR CULT: ABNORMAL
BASOPHILS # BLD AUTO: 0.01 K/UL (ref 0–0.2)
BASOPHILS # BLD AUTO: 0.02 K/UL (ref 0–0.2)
BASOPHILS # BLD AUTO: 0.03 K/UL (ref 0–0.2)
BASOPHILS # BLD AUTO: 0.03 K/UL (ref 0–0.2)
BASOPHILS # BLD AUTO: 0.05 K/UL (ref 0–0.2)
BASOPHILS # BLD AUTO: 0.05 K/UL (ref 0–0.2)
BASOPHILS NFR BLD: 0.1 % (ref 0–1.9)
BASOPHILS NFR BLD: 0.2 % (ref 0–1.9)
BASOPHILS NFR BLD: 0.3 % (ref 0–1.9)
BASOPHILS NFR BLD: 0.3 % (ref 0–1.9)
BASOPHILS NFR BLD: 0.5 % (ref 0–1.9)
BASOPHILS NFR BLD: 0.6 % (ref 0–1.9)
BILIRUB SERPL-MCNC: 0.7 MG/DL (ref 0.1–1)
BILIRUB SERPL-MCNC: 0.8 MG/DL (ref 0.1–1)
BILIRUB SERPL-MCNC: 0.8 MG/DL (ref 0.1–1)
BILIRUB SERPL-MCNC: 0.9 MG/DL (ref 0.1–1)
BNP SERPL-MCNC: 64 PG/ML (ref 0–99)
BNP SERPL-MCNC: 91 PG/ML (ref 0–99)
BUN SERPL-MCNC: 23 MG/DL (ref 8–23)
BUN SERPL-MCNC: 33 MG/DL (ref 8–23)
BUN SERPL-MCNC: 34 MG/DL (ref 8–23)
BUN SERPL-MCNC: 36 MG/DL (ref 8–23)
BUN SERPL-MCNC: 38 MG/DL (ref 8–23)
BUN SERPL-MCNC: 41 MG/DL (ref 8–23)
BUN SERPL-MCNC: 42 MG/DL (ref 8–23)
BUN SERPL-MCNC: 46 MG/DL (ref 8–23)
BUN SERPL-MCNC: 47 MG/DL (ref 8–23)
BUN SERPL-MCNC: 57 MG/DL (ref 8–23)
CALCIUM SERPL-MCNC: 8.5 MG/DL (ref 8.7–10.5)
CALCIUM SERPL-MCNC: 8.7 MG/DL (ref 8.7–10.5)
CALCIUM SERPL-MCNC: 8.9 MG/DL (ref 8.7–10.5)
CALCIUM SERPL-MCNC: 9.2 MG/DL (ref 8.7–10.5)
CALCIUM SERPL-MCNC: 9.2 MG/DL (ref 8.7–10.5)
CALCIUM SERPL-MCNC: 9.3 MG/DL (ref 8.7–10.5)
CALCIUM SERPL-MCNC: 9.4 MG/DL (ref 8.7–10.5)
CALCIUM SERPL-MCNC: 9.5 MG/DL (ref 8.7–10.5)
CHLORIDE SERPL-SCNC: 106 MMOL/L (ref 95–110)
CHLORIDE SERPL-SCNC: 109 MMOL/L (ref 95–110)
CHLORIDE SERPL-SCNC: 112 MMOL/L (ref 95–110)
CHLORIDE SERPL-SCNC: 113 MMOL/L (ref 95–110)
CHLORIDE SERPL-SCNC: 113 MMOL/L (ref 95–110)
CHLORIDE SERPL-SCNC: 114 MMOL/L (ref 95–110)
CHLORIDE SERPL-SCNC: 114 MMOL/L (ref 95–110)
CHLORIDE SERPL-SCNC: 115 MMOL/L (ref 95–110)
CHLORIDE SERPL-SCNC: 115 MMOL/L (ref 95–110)
CHLORIDE SERPL-SCNC: 117 MMOL/L (ref 95–110)
CK SERPL-CCNC: 346 U/L (ref 20–200)
CK SERPL-CCNC: 626 U/L (ref 20–200)
CO2 SERPL-SCNC: 16 MMOL/L (ref 23–29)
CO2 SERPL-SCNC: 17 MMOL/L (ref 23–29)
CO2 SERPL-SCNC: 18 MMOL/L (ref 23–29)
CO2 SERPL-SCNC: 19 MMOL/L (ref 23–29)
CO2 SERPL-SCNC: 20 MMOL/L (ref 23–29)
CREAT SERPL-MCNC: 1.4 MG/DL (ref 0.5–1.4)
CREAT SERPL-MCNC: 1.4 MG/DL (ref 0.5–1.4)
CREAT SERPL-MCNC: 1.5 MG/DL (ref 0.5–1.4)
CREAT SERPL-MCNC: 1.5 MG/DL (ref 0.5–1.4)
CREAT SERPL-MCNC: 1.6 MG/DL (ref 0.5–1.4)
CREAT SERPL-MCNC: 1.7 MG/DL (ref 0.5–1.4)
CREAT SERPL-MCNC: 1.8 MG/DL (ref 0.5–1.4)
CREAT SERPL-MCNC: 1.9 MG/DL (ref 0.5–1.4)
CREAT SERPL-MCNC: 2 MG/DL (ref 0.5–1.4)
CREAT SERPL-MCNC: 2.3 MG/DL (ref 0.5–1.4)
CRP SERPL-MCNC: 11.03 MG/DL (ref 0–0.75)
CRP SERPL-MCNC: 12.85 MG/DL (ref 0–0.75)
CRP SERPL-MCNC: 17.31 MG/DL (ref 0–0.75)
CRP SERPL-MCNC: 21.47 MG/DL (ref 0–0.75)
CRP SERPL-MCNC: 28.94 MG/DL (ref 0–0.75)
D DIMER PPP IA.FEU-MCNC: 2.94 UG/ML FEU
DELSYS: ABNORMAL
DIFFERENTIAL METHOD: ABNORMAL
EOSINOPHIL # BLD AUTO: 0 K/UL (ref 0–0.5)
EOSINOPHIL # BLD AUTO: 0.1 K/UL (ref 0–0.5)
EOSINOPHIL # BLD AUTO: 0.2 K/UL (ref 0–0.5)
EOSINOPHIL # BLD AUTO: 0.2 K/UL (ref 0–0.5)
EOSINOPHIL NFR BLD: 0.1 % (ref 0–8)
EOSINOPHIL NFR BLD: 0.2 % (ref 0–8)
EOSINOPHIL NFR BLD: 0.3 % (ref 0–8)
EOSINOPHIL NFR BLD: 0.7 % (ref 0–8)
EOSINOPHIL NFR BLD: 1.1 % (ref 0–8)
EOSINOPHIL NFR BLD: 1.5 % (ref 0–8)
EOSINOPHIL NFR BLD: 1.8 % (ref 0–8)
EOSINOPHIL NFR BLD: 2.4 % (ref 0–8)
ERYTHROCYTE [DISTWIDTH] IN BLOOD BY AUTOMATED COUNT: 18.6 % (ref 11.5–14.5)
ERYTHROCYTE [DISTWIDTH] IN BLOOD BY AUTOMATED COUNT: 19.1 % (ref 11.5–14.5)
ERYTHROCYTE [DISTWIDTH] IN BLOOD BY AUTOMATED COUNT: 19.2 % (ref 11.5–14.5)
ERYTHROCYTE [DISTWIDTH] IN BLOOD BY AUTOMATED COUNT: 19.6 % (ref 11.5–14.5)
ERYTHROCYTE [DISTWIDTH] IN BLOOD BY AUTOMATED COUNT: 19.8 % (ref 11.5–14.5)
ERYTHROCYTE [DISTWIDTH] IN BLOOD BY AUTOMATED COUNT: 19.9 % (ref 11.5–14.5)
ERYTHROCYTE [DISTWIDTH] IN BLOOD BY AUTOMATED COUNT: 19.9 % (ref 11.5–14.5)
ERYTHROCYTE [DISTWIDTH] IN BLOOD BY AUTOMATED COUNT: 20 % (ref 11.5–14.5)
ERYTHROCYTE [DISTWIDTH] IN BLOOD BY AUTOMATED COUNT: 20.3 % (ref 11.5–14.5)
ERYTHROCYTE [DISTWIDTH] IN BLOOD BY AUTOMATED COUNT: 21.2 % (ref 11.5–14.5)
EST. GFR  (AFRICAN AMERICAN): 29.9 ML/MIN/1.73 M^2
EST. GFR  (AFRICAN AMERICAN): 35.4 ML/MIN/1.73 M^2
EST. GFR  (AFRICAN AMERICAN): 37.7 ML/MIN/1.73 M^2
EST. GFR  (AFRICAN AMERICAN): 40.2 ML/MIN/1.73 M^2
EST. GFR  (AFRICAN AMERICAN): 43.1 ML/MIN/1.73 M^2
EST. GFR  (AFRICAN AMERICAN): 46.3 ML/MIN/1.73 M^2
EST. GFR  (AFRICAN AMERICAN): 50.1 ML/MIN/1.73 M^2
EST. GFR  (AFRICAN AMERICAN): 50.1 ML/MIN/1.73 M^2
EST. GFR  (AFRICAN AMERICAN): 54.5 ML/MIN/1.73 M^2
EST. GFR  (AFRICAN AMERICAN): 54.5 ML/MIN/1.73 M^2
EST. GFR  (NON AFRICAN AMERICAN): 25.9 ML/MIN/1.73 M^2
EST. GFR  (NON AFRICAN AMERICAN): 30.6 ML/MIN/1.73 M^2
EST. GFR  (NON AFRICAN AMERICAN): 32.6 ML/MIN/1.73 M^2
EST. GFR  (NON AFRICAN AMERICAN): 34.8 ML/MIN/1.73 M^2
EST. GFR  (NON AFRICAN AMERICAN): 37.3 ML/MIN/1.73 M^2
EST. GFR  (NON AFRICAN AMERICAN): 40.1 ML/MIN/1.73 M^2
EST. GFR  (NON AFRICAN AMERICAN): 43.3 ML/MIN/1.73 M^2
EST. GFR  (NON AFRICAN AMERICAN): 43.3 ML/MIN/1.73 M^2
EST. GFR  (NON AFRICAN AMERICAN): 47.1 ML/MIN/1.73 M^2
EST. GFR  (NON AFRICAN AMERICAN): 47.1 ML/MIN/1.73 M^2
FERRITIN SERPL-MCNC: 150 NG/ML (ref 20–300)
FERRITIN SERPL-MCNC: 186 NG/ML (ref 20–300)
FERRITIN SERPL-MCNC: 208 NG/ML (ref 20–300)
GLUCOSE SERPL-MCNC: 101 MG/DL (ref 70–110)
GLUCOSE SERPL-MCNC: 102 MG/DL (ref 70–110)
GLUCOSE SERPL-MCNC: 104 MG/DL (ref 70–110)
GLUCOSE SERPL-MCNC: 107 MG/DL (ref 70–110)
GLUCOSE SERPL-MCNC: 108 MG/DL (ref 70–110)
GLUCOSE SERPL-MCNC: 113 MG/DL (ref 70–110)
GLUCOSE SERPL-MCNC: 125 MG/DL (ref 70–110)
GLUCOSE SERPL-MCNC: 82 MG/DL (ref 70–110)
GLUCOSE SERPL-MCNC: 88 MG/DL (ref 70–110)
GLUCOSE SERPL-MCNC: 93 MG/DL (ref 70–110)
GLUCOSE SERPL-MCNC: 93 MG/DL (ref 70–110)
GLUCOSE SERPL-MCNC: 94 MG/DL (ref 70–110)
GLUCOSE SERPL-MCNC: 95 MG/DL (ref 70–110)
HCO3 UR-SCNC: 15.2 MMOL/L (ref 24–28)
HCT VFR BLD AUTO: 30.8 % (ref 40–54)
HCT VFR BLD AUTO: 32.2 % (ref 40–54)
HCT VFR BLD AUTO: 32.5 % (ref 40–54)
HCT VFR BLD AUTO: 33.8 % (ref 40–54)
HCT VFR BLD AUTO: 34 % (ref 40–54)
HCT VFR BLD AUTO: 34.8 % (ref 40–54)
HCT VFR BLD AUTO: 36 % (ref 40–54)
HCT VFR BLD AUTO: 36 % (ref 40–54)
HCT VFR BLD AUTO: 37.1 % (ref 40–54)
HCT VFR BLD AUTO: 37.2 % (ref 40–54)
HGB BLD-MCNC: 10.5 G/DL (ref 14–18)
HGB BLD-MCNC: 10.6 G/DL (ref 14–18)
HGB BLD-MCNC: 10.7 G/DL (ref 14–18)
HGB BLD-MCNC: 10.9 G/DL (ref 14–18)
HGB BLD-MCNC: 11.1 G/DL (ref 14–18)
HGB BLD-MCNC: 11.5 G/DL (ref 14–18)
HGB BLD-MCNC: 11.6 G/DL (ref 14–18)
HGB BLD-MCNC: 11.8 G/DL (ref 14–18)
HGB BLD-MCNC: 9.6 G/DL (ref 14–18)
HGB BLD-MCNC: 9.9 G/DL (ref 14–18)
HYPOCHROMIA BLD QL SMEAR: ABNORMAL
IMM GRANULOCYTES # BLD AUTO: 0.05 K/UL (ref 0–0.04)
IMM GRANULOCYTES # BLD AUTO: 0.06 K/UL (ref 0–0.04)
IMM GRANULOCYTES # BLD AUTO: 0.06 K/UL (ref 0–0.04)
IMM GRANULOCYTES # BLD AUTO: 0.07 K/UL (ref 0–0.04)
IMM GRANULOCYTES # BLD AUTO: 0.08 K/UL (ref 0–0.04)
IMM GRANULOCYTES # BLD AUTO: 0.08 K/UL (ref 0–0.04)
IMM GRANULOCYTES NFR BLD AUTO: 0.5 % (ref 0–0.5)
IMM GRANULOCYTES NFR BLD AUTO: 0.5 % (ref 0–0.5)
IMM GRANULOCYTES NFR BLD AUTO: 0.6 % (ref 0–0.5)
IMM GRANULOCYTES NFR BLD AUTO: 0.6 % (ref 0–0.5)
IMM GRANULOCYTES NFR BLD AUTO: 0.7 % (ref 0–0.5)
IMM GRANULOCYTES NFR BLD AUTO: 0.8 % (ref 0–0.5)
IMM GRANULOCYTES NFR BLD AUTO: 0.9 % (ref 0–0.5)
IMM GRANULOCYTES NFR BLD AUTO: 0.9 % (ref 0–0.5)
LACTATE SERPL-SCNC: 1.3 MMOL/L (ref 0.5–1.9)
LACTATE SERPL-SCNC: 2.3 MMOL/L (ref 0.5–1.9)
LDH SERPL L TO P-CCNC: 552 U/L (ref 110–260)
LYMPHOCYTES # BLD AUTO: 1.4 K/UL (ref 1–4.8)
LYMPHOCYTES # BLD AUTO: 1.5 K/UL (ref 1–4.8)
LYMPHOCYTES # BLD AUTO: 1.5 K/UL (ref 1–4.8)
LYMPHOCYTES # BLD AUTO: 1.6 K/UL (ref 1–4.8)
LYMPHOCYTES # BLD AUTO: 1.7 K/UL (ref 1–4.8)
LYMPHOCYTES # BLD AUTO: 2 K/UL (ref 1–4.8)
LYMPHOCYTES NFR BLD: 14.5 % (ref 18–48)
LYMPHOCYTES NFR BLD: 14.7 % (ref 18–48)
LYMPHOCYTES NFR BLD: 16.3 % (ref 18–48)
LYMPHOCYTES NFR BLD: 17 % (ref 18–48)
LYMPHOCYTES NFR BLD: 17.5 % (ref 18–48)
LYMPHOCYTES NFR BLD: 17.7 % (ref 18–48)
LYMPHOCYTES NFR BLD: 18.4 % (ref 18–48)
LYMPHOCYTES NFR BLD: 18.5 % (ref 18–48)
MAGNESIUM SERPL-MCNC: 1.6 MG/DL (ref 1.6–2.6)
MAGNESIUM SERPL-MCNC: 1.7 MG/DL (ref 1.6–2.6)
MAGNESIUM SERPL-MCNC: 1.7 MG/DL (ref 1.6–2.6)
MAGNESIUM SERPL-MCNC: 1.8 MG/DL (ref 1.6–2.6)
MAGNESIUM SERPL-MCNC: 1.9 MG/DL (ref 1.6–2.6)
MAGNESIUM SERPL-MCNC: 2.1 MG/DL (ref 1.6–2.6)
MCH RBC QN AUTO: 24.3 PG (ref 27–31)
MCH RBC QN AUTO: 24.4 PG (ref 27–31)
MCH RBC QN AUTO: 24.5 PG (ref 27–31)
MCH RBC QN AUTO: 24.5 PG (ref 27–31)
MCH RBC QN AUTO: 24.6 PG (ref 27–31)
MCH RBC QN AUTO: 24.6 PG (ref 27–31)
MCH RBC QN AUTO: 24.8 PG (ref 27–31)
MCH RBC QN AUTO: 24.9 PG (ref 27–31)
MCHC RBC AUTO-ENTMCNC: 30.7 G/DL (ref 32–36)
MCHC RBC AUTO-ENTMCNC: 30.8 G/DL (ref 32–36)
MCHC RBC AUTO-ENTMCNC: 31.2 G/DL (ref 32–36)
MCHC RBC AUTO-ENTMCNC: 31.2 G/DL (ref 32–36)
MCHC RBC AUTO-ENTMCNC: 31.3 G/DL (ref 32–36)
MCHC RBC AUTO-ENTMCNC: 31.4 G/DL (ref 32–36)
MCHC RBC AUTO-ENTMCNC: 31.5 G/DL (ref 32–36)
MCHC RBC AUTO-ENTMCNC: 31.8 G/DL (ref 32–36)
MCHC RBC AUTO-ENTMCNC: 31.9 G/DL (ref 32–36)
MCHC RBC AUTO-ENTMCNC: 32.3 G/DL (ref 32–36)
MCV RBC AUTO: 76 FL (ref 82–98)
MCV RBC AUTO: 77 FL (ref 82–98)
MCV RBC AUTO: 78 FL (ref 82–98)
MCV RBC AUTO: 79 FL (ref 82–98)
MCV RBC AUTO: 79 FL (ref 82–98)
MCV RBC AUTO: 81 FL (ref 82–98)
MONOCYTES # BLD AUTO: 0.3 K/UL (ref 0.3–1)
MONOCYTES # BLD AUTO: 0.3 K/UL (ref 0.3–1)
MONOCYTES # BLD AUTO: 0.4 K/UL (ref 0.3–1)
MONOCYTES # BLD AUTO: 0.5 K/UL (ref 0.3–1)
MONOCYTES # BLD AUTO: 0.5 K/UL (ref 0.3–1)
MONOCYTES # BLD AUTO: 0.6 K/UL (ref 0.3–1)
MONOCYTES # BLD AUTO: 0.7 K/UL (ref 0.3–1)
MONOCYTES # BLD AUTO: 0.7 K/UL (ref 0.3–1)
MONOCYTES NFR BLD: 2.9 % (ref 4–15)
MONOCYTES NFR BLD: 3.4 % (ref 4–15)
MONOCYTES NFR BLD: 4.7 % (ref 4–15)
MONOCYTES NFR BLD: 5.1 % (ref 4–15)
MONOCYTES NFR BLD: 5.8 % (ref 4–15)
MONOCYTES NFR BLD: 6.8 % (ref 4–15)
MONOCYTES NFR BLD: 6.8 % (ref 4–15)
MONOCYTES NFR BLD: 7.6 % (ref 4–15)
NEUTROPHILS # BLD AUTO: 5.9 K/UL (ref 1.8–7.7)
NEUTROPHILS # BLD AUTO: 6.1 K/UL (ref 1.8–7.7)
NEUTROPHILS # BLD AUTO: 6.6 K/UL (ref 1.8–7.7)
NEUTROPHILS # BLD AUTO: 6.7 K/UL (ref 1.8–7.7)
NEUTROPHILS # BLD AUTO: 6.9 K/UL (ref 1.8–7.7)
NEUTROPHILS # BLD AUTO: 7.8 K/UL (ref 1.8–7.7)
NEUTROPHILS # BLD AUTO: 7.9 K/UL (ref 1.8–7.7)
NEUTROPHILS # BLD AUTO: 8.3 K/UL (ref 1.8–7.7)
NEUTROPHILS NFR BLD: 72.6 % (ref 38–73)
NEUTROPHILS NFR BLD: 73.3 % (ref 38–73)
NEUTROPHILS NFR BLD: 73.4 % (ref 38–73)
NEUTROPHILS NFR BLD: 73.5 % (ref 38–73)
NEUTROPHILS NFR BLD: 75 % (ref 38–73)
NEUTROPHILS NFR BLD: 75.8 % (ref 38–73)
NEUTROPHILS NFR BLD: 81 % (ref 38–73)
NEUTROPHILS NFR BLD: 81.7 % (ref 38–73)
NRBC BLD-RTO: 0 /100 WBC
PCO2 BLDA: 24.6 MMHG (ref 35–45)
PH SMN: 7.4 [PH] (ref 7.35–7.45)
PHOSPHATE SERPL-MCNC: 3.7 MG/DL (ref 2.7–4.5)
PHOSPHATE SERPL-MCNC: 3.8 MG/DL (ref 2.7–4.5)
PHOSPHATE SERPL-MCNC: 4 MG/DL (ref 2.7–4.5)
PHOSPHATE SERPL-MCNC: 4.3 MG/DL (ref 2.7–4.5)
PHOSPHATE SERPL-MCNC: 4.6 MG/DL (ref 2.7–4.5)
PLATELET # BLD AUTO: 278 K/UL (ref 150–350)
PLATELET # BLD AUTO: 358 K/UL (ref 150–350)
PLATELET # BLD AUTO: 404 K/UL (ref 150–350)
PLATELET # BLD AUTO: 436 K/UL (ref 150–350)
PLATELET # BLD AUTO: 518 K/UL (ref 150–350)
PLATELET # BLD AUTO: 533 K/UL (ref 150–350)
PLATELET # BLD AUTO: 555 K/UL (ref 150–350)
PLATELET # BLD AUTO: 610 K/UL (ref 150–350)
PLATELET # BLD AUTO: 678 K/UL (ref 150–350)
PLATELET # BLD AUTO: 727 K/UL (ref 150–350)
PLATELET BLD QL SMEAR: ABNORMAL
PMV BLD AUTO: 10.1 FL (ref 9.2–12.9)
PMV BLD AUTO: 10.1 FL (ref 9.2–12.9)
PMV BLD AUTO: 10.2 FL (ref 9.2–12.9)
PMV BLD AUTO: 10.4 FL (ref 9.2–12.9)
PMV BLD AUTO: 10.7 FL (ref 9.2–12.9)
PMV BLD AUTO: 10.7 FL (ref 9.2–12.9)
PMV BLD AUTO: 11 FL (ref 9.2–12.9)
PMV BLD AUTO: 9.4 FL (ref 9.2–12.9)
PMV BLD AUTO: 9.8 FL (ref 9.2–12.9)
PMV BLD AUTO: 9.9 FL (ref 9.2–12.9)
PO2 BLDA: 102 MMHG (ref 80–100)
POC BE: -10 MMOL/L
POC SATURATED O2: 98 % (ref 95–100)
POC TCO2: 16 MMOL/L (ref 23–27)
POTASSIUM SERPL-SCNC: 3.7 MMOL/L (ref 3.5–5.1)
POTASSIUM SERPL-SCNC: 3.9 MMOL/L (ref 3.5–5.1)
POTASSIUM SERPL-SCNC: 3.9 MMOL/L (ref 3.5–5.1)
POTASSIUM SERPL-SCNC: 4 MMOL/L (ref 3.5–5.1)
POTASSIUM SERPL-SCNC: 4.2 MMOL/L (ref 3.5–5.1)
POTASSIUM SERPL-SCNC: 4.3 MMOL/L (ref 3.5–5.1)
POTASSIUM SERPL-SCNC: 4.4 MMOL/L (ref 3.5–5.1)
POTASSIUM SERPL-SCNC: 4.4 MMOL/L (ref 3.5–5.1)
POTASSIUM SERPL-SCNC: 4.5 MMOL/L (ref 3.5–5.1)
PROCALCITONIN SERPL IA-MCNC: 0.69 NG/ML (ref 0–0.5)
PROT SERPL-MCNC: 7.7 G/DL (ref 6–8.4)
PROT SERPL-MCNC: 7.9 G/DL (ref 6–8.4)
PROT SERPL-MCNC: 7.9 G/DL (ref 6–8.4)
PROT SERPL-MCNC: 8 G/DL (ref 6–8.4)
PROT SERPL-MCNC: 8.1 G/DL (ref 6–8.4)
PROT SERPL-MCNC: 8.2 G/DL (ref 6–8.4)
PROT SERPL-MCNC: 8.2 G/DL (ref 6–8.4)
PROT SERPL-MCNC: 8.3 G/DL (ref 6–8.4)
PROT SERPL-MCNC: 8.3 G/DL (ref 6–8.4)
RBC # BLD AUTO: 3.91 M/UL (ref 4.6–6.2)
RBC # BLD AUTO: 4.08 M/UL (ref 4.6–6.2)
RBC # BLD AUTO: 4.27 M/UL (ref 4.6–6.2)
RBC # BLD AUTO: 4.34 M/UL (ref 4.6–6.2)
RBC # BLD AUTO: 4.36 M/UL (ref 4.6–6.2)
RBC # BLD AUTO: 4.45 M/UL (ref 4.6–6.2)
RBC # BLD AUTO: 4.45 M/UL (ref 4.6–6.2)
RBC # BLD AUTO: 4.64 M/UL (ref 4.6–6.2)
RBC # BLD AUTO: 4.77 M/UL (ref 4.6–6.2)
RBC # BLD AUTO: 4.85 M/UL (ref 4.6–6.2)
SAMPLE: ABNORMAL
SARS-COV-2 RNA RESP QL NAA+PROBE: DETECTED
SARS-COV-2 RNA RESP QL NAA+PROBE: DETECTED
SITE: ABNORMAL
SODIUM SERPL-SCNC: 135 MMOL/L (ref 136–145)
SODIUM SERPL-SCNC: 141 MMOL/L (ref 136–145)
SODIUM SERPL-SCNC: 141 MMOL/L (ref 136–145)
SODIUM SERPL-SCNC: 143 MMOL/L (ref 136–145)
SODIUM SERPL-SCNC: 144 MMOL/L (ref 136–145)
SODIUM SERPL-SCNC: 144 MMOL/L (ref 136–145)
SODIUM SERPL-SCNC: 145 MMOL/L (ref 136–145)
SODIUM SERPL-SCNC: 145 MMOL/L (ref 136–145)
SODIUM SERPL-SCNC: 146 MMOL/L (ref 136–145)
SODIUM SERPL-SCNC: 146 MMOL/L (ref 136–145)
SODIUM SERPL-SCNC: 147 MMOL/L (ref 136–145)
SODIUM SERPL-SCNC: 147 MMOL/L (ref 136–145)
TROPONIN I SERPL DL<=0.01 NG/ML-MCNC: 0.04 NG/ML
TROPONIN I SERPL DL<=0.01 NG/ML-MCNC: <0.03 NG/ML
WBC # BLD AUTO: 10.8 K/UL (ref 3.9–12.7)
WBC # BLD AUTO: 10.9 K/UL (ref 3.9–12.7)
WBC # BLD AUTO: 7.99 K/UL (ref 3.9–12.7)
WBC # BLD AUTO: 8.11 K/UL (ref 3.9–12.7)
WBC # BLD AUTO: 8.15 K/UL (ref 3.9–12.7)
WBC # BLD AUTO: 9.04 K/UL (ref 3.9–12.7)
WBC # BLD AUTO: 9.11 K/UL (ref 3.9–12.7)
WBC # BLD AUTO: 9.49 K/UL (ref 3.9–12.7)
WBC # BLD AUTO: 9.61 K/UL (ref 3.9–12.7)
WBC # BLD AUTO: 9.67 K/UL (ref 3.9–12.7)

## 2020-01-01 PROCEDURE — 83605 ASSAY OF LACTIC ACID: CPT

## 2020-01-01 PROCEDURE — 85379 FIBRIN DEGRADATION QUANT: CPT

## 2020-01-01 PROCEDURE — 25000003 PHARM REV CODE 250: Performed by: NURSE PRACTITIONER

## 2020-01-01 PROCEDURE — 94640 AIRWAY INHALATION TREATMENT: CPT

## 2020-01-01 PROCEDURE — 82010 KETONE BODYS QUAN: CPT

## 2020-01-01 PROCEDURE — 25000003 PHARM REV CODE 250

## 2020-01-01 PROCEDURE — 99900035 HC TECH TIME PER 15 MIN (STAT)

## 2020-01-01 PROCEDURE — 36415 COLL VENOUS BLD VENIPUNCTURE: CPT

## 2020-01-01 PROCEDURE — 25000242 PHARM REV CODE 250 ALT 637 W/ HCPCS: Performed by: NURSE PRACTITIONER

## 2020-01-01 PROCEDURE — C9113 INJ PANTOPRAZOLE SODIUM, VIA: HCPCS | Performed by: NURSE PRACTITIONER

## 2020-01-01 PROCEDURE — 27000221 HC OXYGEN, UP TO 24 HOURS

## 2020-01-01 PROCEDURE — 63600175 PHARM REV CODE 636 W HCPCS: Performed by: NURSE PRACTITIONER

## 2020-01-01 PROCEDURE — U0002 COVID-19 LAB TEST NON-CDC: HCPCS

## 2020-01-01 PROCEDURE — 25000003 PHARM REV CODE 250: Performed by: INTERNAL MEDICINE

## 2020-01-01 PROCEDURE — 94761 N-INVAS EAR/PLS OXIMETRY MLT: CPT

## 2020-01-01 PROCEDURE — 84100 ASSAY OF PHOSPHORUS: CPT

## 2020-01-01 PROCEDURE — 97530 THERAPEUTIC ACTIVITIES: CPT

## 2020-01-01 PROCEDURE — 83735 ASSAY OF MAGNESIUM: CPT

## 2020-01-01 PROCEDURE — 83880 ASSAY OF NATRIURETIC PEPTIDE: CPT

## 2020-01-01 PROCEDURE — 80053 COMPREHEN METABOLIC PANEL: CPT

## 2020-01-01 PROCEDURE — 93005 ELECTROCARDIOGRAM TRACING: CPT | Performed by: INTERNAL MEDICINE

## 2020-01-01 PROCEDURE — 82550 ASSAY OF CK (CPK): CPT

## 2020-01-01 PROCEDURE — 63600175 PHARM REV CODE 636 W HCPCS: Performed by: INTERNAL MEDICINE

## 2020-01-01 PROCEDURE — 97161 PT EVAL LOW COMPLEX 20 MIN: CPT

## 2020-01-01 PROCEDURE — 97535 SELF CARE MNGMENT TRAINING: CPT

## 2020-01-01 PROCEDURE — 63700000 PHARM REV CODE 250 ALT 637 W/O HCPCS: Performed by: INTERNAL MEDICINE

## 2020-01-01 PROCEDURE — 92610 EVALUATE SWALLOWING FUNCTION: CPT

## 2020-01-01 PROCEDURE — 97110 THERAPEUTIC EXERCISES: CPT

## 2020-01-01 PROCEDURE — 92526 ORAL FUNCTION THERAPY: CPT

## 2020-01-01 PROCEDURE — 96361 HYDRATE IV INFUSION ADD-ON: CPT

## 2020-01-01 PROCEDURE — 12000002 HC ACUTE/MED SURGE SEMI-PRIVATE ROOM

## 2020-01-01 PROCEDURE — 85025 COMPLETE CBC W/AUTO DIFF WBC: CPT

## 2020-01-01 PROCEDURE — 82728 ASSAY OF FERRITIN: CPT

## 2020-01-01 PROCEDURE — 84484 ASSAY OF TROPONIN QUANT: CPT

## 2020-01-01 PROCEDURE — 87186 SC STD MICRODIL/AGAR DIL: CPT

## 2020-01-01 PROCEDURE — 86140 C-REACTIVE PROTEIN: CPT

## 2020-01-01 PROCEDURE — 63700000 PHARM REV CODE 250 ALT 637 W/O HCPCS: Performed by: NURSE PRACTITIONER

## 2020-01-01 PROCEDURE — 97530 THERAPEUTIC ACTIVITIES: CPT | Mod: CQ

## 2020-01-01 PROCEDURE — 87086 URINE CULTURE/COLONY COUNT: CPT

## 2020-01-01 PROCEDURE — 97165 OT EVAL LOW COMPLEX 30 MIN: CPT

## 2020-01-01 PROCEDURE — 25000242 PHARM REV CODE 250 ALT 637 W/ HCPCS: Performed by: EMERGENCY MEDICINE

## 2020-01-01 PROCEDURE — 21400001 HC TELEMETRY ROOM

## 2020-01-01 PROCEDURE — 99213 PR OFFICE/OUTPT VISIT, EST, LEVL III, 20-29 MIN: ICD-10-PCS | Mod: S$GLB,,, | Performed by: INTERNAL MEDICINE

## 2020-01-01 PROCEDURE — 20000000 HC ICU ROOM

## 2020-01-01 PROCEDURE — 80048 BASIC METABOLIC PNL TOTAL CA: CPT

## 2020-01-01 PROCEDURE — 99213 OFFICE O/P EST LOW 20 MIN: CPT | Mod: S$GLB,,, | Performed by: INTERNAL MEDICINE

## 2020-01-01 PROCEDURE — 1125F PR PAIN SEVERITY QUANTIFIED, PAIN PRESENT: ICD-10-PCS | Mod: S$GLB,,, | Performed by: INTERNAL MEDICINE

## 2020-01-01 PROCEDURE — 84145 PROCALCITONIN (PCT): CPT

## 2020-01-01 PROCEDURE — 85027 COMPLETE CBC AUTOMATED: CPT

## 2020-01-01 PROCEDURE — 1159F MED LIST DOCD IN RCRD: CPT | Mod: S$GLB,,, | Performed by: INTERNAL MEDICINE

## 2020-01-01 PROCEDURE — 87077 CULTURE AEROBIC IDENTIFY: CPT

## 2020-01-01 PROCEDURE — 63600175 PHARM REV CODE 636 W HCPCS: Performed by: EMERGENCY MEDICINE

## 2020-01-01 PROCEDURE — 1101F PR PT FALLS ASSESS DOC 0-1 FALLS W/OUT INJ PAST YR: ICD-10-PCS | Mod: S$GLB,,, | Performed by: INTERNAL MEDICINE

## 2020-01-01 PROCEDURE — 96360 HYDRATION IV INFUSION INIT: CPT | Mod: GZ

## 2020-01-01 PROCEDURE — 99285 EMERGENCY DEPT VISIT HI MDM: CPT | Mod: 25

## 2020-01-01 PROCEDURE — 1125F AMNT PAIN NOTED PAIN PRSNT: CPT | Mod: S$GLB,,, | Performed by: INTERNAL MEDICINE

## 2020-01-01 PROCEDURE — 82140 ASSAY OF AMMONIA: CPT

## 2020-01-01 PROCEDURE — 87040 BLOOD CULTURE FOR BACTERIA: CPT

## 2020-01-01 PROCEDURE — 82803 BLOOD GASES ANY COMBINATION: CPT

## 2020-01-01 PROCEDURE — 83615 LACTATE (LD) (LDH) ENZYME: CPT

## 2020-01-01 PROCEDURE — 36600 WITHDRAWAL OF ARTERIAL BLOOD: CPT

## 2020-01-01 PROCEDURE — 1101F PT FALLS ASSESS-DOCD LE1/YR: CPT | Mod: S$GLB,,, | Performed by: INTERNAL MEDICINE

## 2020-01-01 PROCEDURE — 1159F PR MEDICATION LIST DOCUMENTED IN MEDICAL RECORD: ICD-10-PCS | Mod: S$GLB,,, | Performed by: INTERNAL MEDICINE

## 2020-01-01 RX ORDER — PANTOPRAZOLE SODIUM 40 MG/1
40 TABLET, DELAYED RELEASE ORAL DAILY
Status: DISCONTINUED | OUTPATIENT
Start: 2020-01-01 | End: 2020-01-01

## 2020-01-01 RX ORDER — HYDROXYCHLOROQUINE SULFATE 200 MG/1
400 TABLET, FILM COATED ORAL DAILY
Status: DISCONTINUED | OUTPATIENT
Start: 2020-01-01 | End: 2020-01-01

## 2020-01-01 RX ORDER — LORAZEPAM 2 MG/ML
1 INJECTION INTRAMUSCULAR EVERY 6 HOURS PRN
Status: DISCONTINUED | OUTPATIENT
Start: 2020-01-01 | End: 2020-01-01

## 2020-01-01 RX ORDER — MORPHINE SULFATE 2 MG/ML
1 INJECTION, SOLUTION INTRAMUSCULAR; INTRAVENOUS EVERY 4 HOURS PRN
Status: DISCONTINUED | OUTPATIENT
Start: 2020-01-01 | End: 2020-01-01

## 2020-01-01 RX ORDER — PANTOPRAZOLE SODIUM 40 MG/10ML
40 INJECTION, POWDER, LYOPHILIZED, FOR SOLUTION INTRAVENOUS DAILY
Status: DISCONTINUED | OUTPATIENT
Start: 2020-01-01 | End: 2020-01-01

## 2020-01-01 RX ORDER — POTASSIUM CHLORIDE 7.45 MG/ML
40 INJECTION INTRAVENOUS
Status: DISCONTINUED | OUTPATIENT
Start: 2020-01-01 | End: 2020-01-01

## 2020-01-01 RX ORDER — HYDROXYCHLOROQUINE SULFATE 200 MG/1
400 TABLET, FILM COATED ORAL DAILY
Status: DISPENSED | OUTPATIENT
Start: 2020-01-01 | End: 2020-01-01

## 2020-01-01 RX ORDER — NAPROXEN SODIUM 220 MG/1
325 TABLET, FILM COATED ORAL DAILY
Status: DISCONTINUED | OUTPATIENT
Start: 2020-01-01 | End: 2020-01-01

## 2020-01-01 RX ORDER — ALFUZOSIN HYDROCHLORIDE 10 MG/1
10 TABLET, EXTENDED RELEASE ORAL
Status: DISCONTINUED | OUTPATIENT
Start: 2020-01-01 | End: 2020-01-01

## 2020-01-01 RX ORDER — SODIUM CHLORIDE 450 MG/100ML
INJECTION, SOLUTION INTRAVENOUS CONTINUOUS
Status: DISCONTINUED | OUTPATIENT
Start: 2020-01-01 | End: 2020-01-01

## 2020-01-01 RX ORDER — ONDANSETRON 2 MG/ML
4 INJECTION INTRAMUSCULAR; INTRAVENOUS EVERY 6 HOURS PRN
Status: DISCONTINUED | OUTPATIENT
Start: 2020-01-01 | End: 2020-01-01 | Stop reason: HOSPADM

## 2020-01-01 RX ORDER — MAGNESIUM SULFATE HEPTAHYDRATE 40 MG/ML
4 INJECTION, SOLUTION INTRAVENOUS
Status: DISCONTINUED | OUTPATIENT
Start: 2020-01-01 | End: 2020-01-01

## 2020-01-01 RX ORDER — AMLODIPINE BESYLATE 5 MG/1
10 TABLET ORAL DAILY
Status: DISCONTINUED | OUTPATIENT
Start: 2020-01-01 | End: 2020-01-01

## 2020-01-01 RX ORDER — AZITHROMYCIN 250 MG/1
250 TABLET, FILM COATED ORAL DAILY
Status: COMPLETED | OUTPATIENT
Start: 2020-01-01 | End: 2020-01-01

## 2020-01-01 RX ORDER — SODIUM CHLORIDE 0.9 % (FLUSH) 0.9 %
10 SYRINGE (ML) INJECTION
Status: DISCONTINUED | OUTPATIENT
Start: 2020-01-01 | End: 2020-01-01

## 2020-01-01 RX ORDER — AZITHROMYCIN 250 MG/1
250 TABLET, FILM COATED ORAL DAILY
Status: DISCONTINUED | OUTPATIENT
Start: 2020-01-01 | End: 2020-01-01

## 2020-01-01 RX ORDER — CHLORHEXIDINE GLUCONATE ORAL RINSE 1.2 MG/ML
15 SOLUTION DENTAL 2 TIMES DAILY
Status: DISPENSED | OUTPATIENT
Start: 2020-01-01 | End: 2020-01-01

## 2020-01-01 RX ORDER — FLUTICASONE PROPIONATE 50 MCG
1 SPRAY, SUSPENSION (ML) NASAL DAILY
Status: ON HOLD | COMMUNITY
Start: 2020-01-01 | End: 2020-01-01 | Stop reason: HOSPADM

## 2020-01-01 RX ORDER — LORAZEPAM 2 MG/ML
2 INJECTION INTRAMUSCULAR EVERY 6 HOURS PRN
Status: DISCONTINUED | OUTPATIENT
Start: 2020-01-01 | End: 2020-01-01 | Stop reason: HOSPADM

## 2020-01-01 RX ORDER — CARVEDILOL 25 MG/1
25 TABLET ORAL 2 TIMES DAILY WITH MEALS
Status: DISCONTINUED | OUTPATIENT
Start: 2020-01-01 | End: 2020-01-01

## 2020-01-01 RX ORDER — MORPHINE SULFATE 2 MG/ML
2 INJECTION, SOLUTION INTRAMUSCULAR; INTRAVENOUS EVERY 4 HOURS PRN
Status: DISCONTINUED | OUTPATIENT
Start: 2020-01-01 | End: 2020-01-01 | Stop reason: HOSPADM

## 2020-01-01 RX ORDER — HYDROCODONE BITARTRATE AND ACETAMINOPHEN 10; 325 MG/1; MG/1
1 TABLET ORAL EVERY 6 HOURS PRN
Status: ON HOLD | COMMUNITY
Start: 2020-01-01 | End: 2020-01-01 | Stop reason: HOSPADM

## 2020-01-01 RX ORDER — BENZONATATE 100 MG/1
100 CAPSULE ORAL 2 TIMES DAILY
Qty: 20 CAPSULE | Refills: 0 | Status: ON HOLD | OUTPATIENT
Start: 2020-01-01 | End: 2020-01-01 | Stop reason: HOSPADM

## 2020-01-01 RX ORDER — HYDROXYCHLOROQUINE SULFATE 200 MG/1
400 TABLET, FILM COATED ORAL 2 TIMES DAILY
Status: COMPLETED | OUTPATIENT
Start: 2020-01-01 | End: 2020-01-01

## 2020-01-01 RX ORDER — SODIUM CHLORIDE 9 MG/ML
INJECTION, SOLUTION INTRAVENOUS CONTINUOUS
Status: DISCONTINUED | OUTPATIENT
Start: 2020-01-01 | End: 2020-01-01

## 2020-01-01 RX ORDER — LORATADINE 10 MG/1
10 TABLET ORAL DAILY
Status: ON HOLD | COMMUNITY
Start: 2020-01-01 | End: 2020-01-01 | Stop reason: HOSPADM

## 2020-01-01 RX ORDER — ACETAMINOPHEN 325 MG/1
650 TABLET ORAL EVERY 8 HOURS PRN
Status: DISCONTINUED | OUTPATIENT
Start: 2020-01-01 | End: 2020-01-01

## 2020-01-01 RX ORDER — ENOXAPARIN SODIUM 100 MG/ML
40 INJECTION SUBCUTANEOUS EVERY 24 HOURS
Status: DISCONTINUED | OUTPATIENT
Start: 2020-01-01 | End: 2020-01-01

## 2020-01-01 RX ORDER — POLYETHYLENE GLYCOL 3350 17 G/17G
17 POWDER, FOR SOLUTION ORAL 2 TIMES DAILY PRN
Status: DISCONTINUED | OUTPATIENT
Start: 2020-01-01 | End: 2020-01-01

## 2020-01-01 RX ORDER — ALBUTEROL SULFATE 90 UG/1
2 AEROSOL, METERED RESPIRATORY (INHALATION) EVERY 4 HOURS PRN
Status: DISCONTINUED | OUTPATIENT
Start: 2020-01-01 | End: 2020-01-01

## 2020-01-01 RX ORDER — ALBUTEROL SULFATE 90 UG/1
4 AEROSOL, METERED RESPIRATORY (INHALATION) EVERY 6 HOURS PRN
Status: DISCONTINUED | OUTPATIENT
Start: 2020-01-01 | End: 2020-01-01 | Stop reason: HOSPADM

## 2020-01-01 RX ORDER — ACETAMINOPHEN 325 MG/1
650 TABLET ORAL EVERY 4 HOURS PRN
Status: DISCONTINUED | OUTPATIENT
Start: 2020-01-01 | End: 2020-01-01

## 2020-01-01 RX ORDER — ALBUTEROL SULFATE 90 UG/1
4 AEROSOL, METERED RESPIRATORY (INHALATION) EVERY 8 HOURS
Status: DISCONTINUED | OUTPATIENT
Start: 2020-01-01 | End: 2020-01-01

## 2020-01-01 RX ORDER — ACETAMINOPHEN 325 MG/1
325 TABLET ORAL EVERY 6 HOURS PRN
Status: ON HOLD | COMMUNITY
End: 2020-01-01 | Stop reason: HOSPADM

## 2020-01-01 RX ORDER — MAGNESIUM SULFATE HEPTAHYDRATE 40 MG/ML
2 INJECTION, SOLUTION INTRAVENOUS
Status: DISCONTINUED | OUTPATIENT
Start: 2020-01-01 | End: 2020-01-01

## 2020-01-01 RX ORDER — HYDROCODONE BITARTRATE AND ACETAMINOPHEN 10; 325 MG/1; MG/1
1 TABLET ORAL EVERY 6 HOURS PRN
Status: DISCONTINUED | OUTPATIENT
Start: 2020-01-01 | End: 2020-01-01

## 2020-01-01 RX ORDER — AZITHROMYCIN 250 MG/1
250 TABLET, FILM COATED ORAL DAILY
Status: ON HOLD | COMMUNITY
Start: 2020-01-01 | End: 2020-01-01 | Stop reason: HOSPADM

## 2020-01-01 RX ORDER — MUPIROCIN 20 MG/G
OINTMENT TOPICAL 2 TIMES DAILY
Status: DISPENSED | OUTPATIENT
Start: 2020-01-01 | End: 2020-01-01

## 2020-01-01 RX ORDER — SODIUM CHLORIDE 9 MG/ML
500 INJECTION, SOLUTION INTRAVENOUS
Status: COMPLETED | OUTPATIENT
Start: 2020-01-01 | End: 2020-01-01

## 2020-01-01 RX ORDER — MORPHINE SULFATE 2 MG/ML
1 INJECTION, SOLUTION INTRAMUSCULAR; INTRAVENOUS ONCE
Status: DISCONTINUED | OUTPATIENT
Start: 2020-01-01 | End: 2020-01-01

## 2020-01-01 RX ADMIN — ALBUTEROL SULFATE 4 PUFF: 90 AEROSOL, METERED RESPIRATORY (INHALATION) at 11:04

## 2020-01-01 RX ADMIN — ENOXAPARIN SODIUM 40 MG: 100 INJECTION SUBCUTANEOUS at 09:04

## 2020-01-01 RX ADMIN — AMLODIPINE BESYLATE 10 MG: 5 TABLET ORAL at 09:04

## 2020-01-01 RX ADMIN — LORAZEPAM 1 MG: 2 INJECTION INTRAMUSCULAR; INTRAVENOUS at 03:04

## 2020-01-01 RX ADMIN — ALBUTEROL SULFATE 4 PUFF: 90 AEROSOL, METERED RESPIRATORY (INHALATION) at 04:04

## 2020-01-01 RX ADMIN — DARUNAVIR ETHANOLATE AND COBICISTAT 1 TABLET: 800; 150 TABLET, FILM COATED ORAL at 10:04

## 2020-01-01 RX ADMIN — LORAZEPAM 1 MG: 2 INJECTION INTRAMUSCULAR; INTRAVENOUS at 11:04

## 2020-01-01 RX ADMIN — PANTOPRAZOLE SODIUM 40 MG: 40 INJECTION, POWDER, LYOPHILIZED, FOR SOLUTION INTRAVENOUS at 08:04

## 2020-01-01 RX ADMIN — SODIUM CHLORIDE: 0.9 INJECTION, SOLUTION INTRAVENOUS at 07:04

## 2020-01-01 RX ADMIN — AMLODIPINE BESYLATE 10 MG: 5 TABLET ORAL at 10:04

## 2020-01-01 RX ADMIN — AZITHROMYCIN 250 MG: 250 TABLET, FILM COATED ORAL at 08:04

## 2020-01-01 RX ADMIN — ENOXAPARIN SODIUM 40 MG: 100 INJECTION SUBCUTANEOUS at 08:04

## 2020-01-01 RX ADMIN — AZITHROMYCIN 250 MG: 250 TABLET, FILM COATED ORAL at 02:04

## 2020-01-01 RX ADMIN — CEFTRIAXONE SODIUM 1 G: 1 INJECTION, POWDER, FOR SOLUTION INTRAMUSCULAR; INTRAVENOUS at 12:04

## 2020-01-01 RX ADMIN — ALBUTEROL SULFATE 4 PUFF: 90 AEROSOL, METERED RESPIRATORY (INHALATION) at 12:04

## 2020-01-01 RX ADMIN — PANTOPRAZOLE SODIUM 40 MG: 40 TABLET, DELAYED RELEASE ORAL at 09:04

## 2020-01-01 RX ADMIN — ASPIRIN 81 MG 324 MG: 81 TABLET ORAL at 10:04

## 2020-01-01 RX ADMIN — LORAZEPAM 2 MG: 2 INJECTION INTRAMUSCULAR; INTRAVENOUS at 04:04

## 2020-01-01 RX ADMIN — DARUNAVIR ETHANOLATE AND COBICISTAT 1 TABLET: 800; 150 TABLET, FILM COATED ORAL at 09:04

## 2020-01-01 RX ADMIN — SODIUM CHLORIDE: 0.45 INJECTION, SOLUTION INTRAVENOUS at 10:04

## 2020-01-01 RX ADMIN — CARVEDILOL 25 MG: 25 TABLET, FILM COATED ORAL at 04:04

## 2020-01-01 RX ADMIN — SODIUM CHLORIDE 500 ML: 0.45 INJECTION, SOLUTION INTRAVENOUS at 08:04

## 2020-01-01 RX ADMIN — ALBUTEROL SULFATE 2 PUFF: 90 AEROSOL, METERED RESPIRATORY (INHALATION) at 07:04

## 2020-01-01 RX ADMIN — ALBUTEROL SULFATE 4 PUFF: 90 AEROSOL, METERED RESPIRATORY (INHALATION) at 10:04

## 2020-01-01 RX ADMIN — LORAZEPAM 2 MG: 2 INJECTION INTRAMUSCULAR; INTRAVENOUS at 05:04

## 2020-01-01 RX ADMIN — ALBUTEROL SULFATE 4 PUFF: 90 AEROSOL, METERED RESPIRATORY (INHALATION) at 08:04

## 2020-01-01 RX ADMIN — SODIUM CHLORIDE: 0.9 INJECTION, SOLUTION INTRAVENOUS at 10:04

## 2020-01-01 RX ADMIN — CHLORHEXIDINE GLUCONATE 15 ML: 1.2 RINSE ORAL at 08:04

## 2020-01-01 RX ADMIN — SODIUM CHLORIDE: 0.9 INJECTION, SOLUTION INTRAVENOUS at 08:04

## 2020-01-01 RX ADMIN — MORPHINE SULFATE 1 MG: 2 INJECTION, SOLUTION INTRAMUSCULAR; INTRAVENOUS at 01:04

## 2020-01-01 RX ADMIN — HYDROXYCHLOROQUINE SULFATE 400 MG: 200 TABLET, FILM COATED ORAL at 08:04

## 2020-01-01 RX ADMIN — PANTOPRAZOLE SODIUM 40 MG: 40 TABLET, DELAYED RELEASE ORAL at 10:04

## 2020-01-01 RX ADMIN — MUPIROCIN: 20 OINTMENT TOPICAL at 10:04

## 2020-01-01 RX ADMIN — AZITHROMYCIN 250 MG: 250 TABLET, FILM COATED ORAL at 09:04

## 2020-01-01 RX ADMIN — MORPHINE SULFATE 2 MG: 2 INJECTION, SOLUTION INTRAMUSCULAR; INTRAVENOUS at 10:04

## 2020-01-01 RX ADMIN — SODIUM CHLORIDE: 0.45 INJECTION, SOLUTION INTRAVENOUS at 12:04

## 2020-01-01 RX ADMIN — HYDROXYCHLOROQUINE SULFATE 400 MG: 200 TABLET, FILM COATED ORAL at 02:04

## 2020-01-01 RX ADMIN — CEFTRIAXONE SODIUM 1 G: 1 INJECTION, POWDER, FOR SOLUTION INTRAMUSCULAR; INTRAVENOUS at 06:04

## 2020-01-01 RX ADMIN — SODIUM CHLORIDE 1000 ML: 0.9 INJECTION, SOLUTION INTRAVENOUS at 04:03

## 2020-01-01 RX ADMIN — DARUNAVIR ETHANOLATE AND COBICISTAT 1 TABLET: 800; 150 TABLET, FILM COATED ORAL at 05:04

## 2020-01-01 RX ADMIN — CARVEDILOL 25 MG: 25 TABLET, FILM COATED ORAL at 09:04

## 2020-01-01 RX ADMIN — ASPIRIN 81 MG 324 MG: 81 TABLET ORAL at 08:04

## 2020-01-01 RX ADMIN — ALBUTEROL SULFATE 4 PUFF: 90 AEROSOL, METERED RESPIRATORY (INHALATION) at 09:04

## 2020-01-01 RX ADMIN — MUPIROCIN: 20 OINTMENT TOPICAL at 11:04

## 2020-01-01 RX ADMIN — MORPHINE SULFATE 1 MG: 2 INJECTION, SOLUTION INTRAMUSCULAR; INTRAVENOUS at 12:04

## 2020-01-01 RX ADMIN — DARUNAVIR ETHANOLATE AND COBICISTAT 1 TABLET: 800; 150 TABLET, FILM COATED ORAL at 12:04

## 2020-01-01 RX ADMIN — ALBUTEROL SULFATE 4 PUFF: 90 AEROSOL, METERED RESPIRATORY (INHALATION) at 02:04

## 2020-01-01 RX ADMIN — LORAZEPAM 1 MG: 2 INJECTION INTRAMUSCULAR; INTRAVENOUS at 04:04

## 2020-01-01 RX ADMIN — LORAZEPAM 1 MG: 2 INJECTION INTRAMUSCULAR; INTRAVENOUS at 05:04

## 2020-01-01 RX ADMIN — SODIUM CHLORIDE 50 ML/HR: 0.9 INJECTION, SOLUTION INTRAVENOUS at 08:04

## 2020-01-01 RX ADMIN — CARVEDILOL 25 MG: 25 TABLET, FILM COATED ORAL at 05:04

## 2020-01-01 RX ADMIN — ALBUTEROL SULFATE 4 PUFF: 90 AEROSOL, METERED RESPIRATORY (INHALATION) at 01:04

## 2020-01-01 RX ADMIN — SODIUM CHLORIDE 500 ML: 0.9 INJECTION, SOLUTION INTRAVENOUS at 04:04

## 2020-01-01 RX ADMIN — ACETAMINOPHEN 650 MG: 325 TABLET ORAL at 05:04

## 2020-01-01 RX ADMIN — CHLORHEXIDINE GLUCONATE 15 ML: 1.2 RINSE ORAL at 10:04

## 2020-01-01 RX ADMIN — CHLORHEXIDINE GLUCONATE 15 ML: 1.2 RINSE ORAL at 09:04

## 2020-01-01 RX ADMIN — AMLODIPINE BESYLATE 10 MG: 5 TABLET ORAL at 08:04

## 2020-01-01 RX ADMIN — ASPIRIN 81 MG 324 MG: 81 TABLET ORAL at 09:04

## 2020-01-01 RX ADMIN — CARVEDILOL 25 MG: 25 TABLET, FILM COATED ORAL at 07:04

## 2020-01-01 RX ADMIN — CARVEDILOL 25 MG: 25 TABLET, FILM COATED ORAL at 08:04

## 2020-01-01 RX ADMIN — DARUNAVIR ETHANOLATE AND COBICISTAT 1 TABLET: 800; 150 TABLET, FILM COATED ORAL at 08:04

## 2020-01-01 RX ADMIN — ALBUTEROL SULFATE 4 PUFF: 90 AEROSOL, METERED RESPIRATORY (INHALATION) at 03:04

## 2020-01-01 RX ADMIN — MORPHINE SULFATE 1 MG: 2 INJECTION, SOLUTION INTRAMUSCULAR; INTRAVENOUS at 11:04

## 2020-01-01 RX ADMIN — LORAZEPAM 2 MG: 2 INJECTION INTRAMUSCULAR; INTRAVENOUS at 02:04

## 2020-01-01 RX ADMIN — MUPIROCIN: 20 OINTMENT TOPICAL at 09:04

## 2020-01-01 RX ADMIN — PANTOPRAZOLE SODIUM 40 MG: 40 TABLET, DELAYED RELEASE ORAL at 08:04

## 2020-01-01 RX ADMIN — ENOXAPARIN SODIUM 40 MG: 100 INJECTION SUBCUTANEOUS at 10:04

## 2020-01-01 RX ADMIN — ENOXAPARIN SODIUM 40 MG: 100 INJECTION SUBCUTANEOUS at 11:04

## 2020-01-01 RX ADMIN — CHLORHEXIDINE GLUCONATE 15 ML: 1.2 RINSE ORAL at 11:04

## 2020-01-01 RX ADMIN — LORAZEPAM 1 MG: 2 INJECTION INTRAMUSCULAR; INTRAVENOUS at 10:04

## 2020-01-01 RX ADMIN — MORPHINE SULFATE 1 MG: 2 INJECTION, SOLUTION INTRAMUSCULAR; INTRAVENOUS at 05:04

## 2020-01-01 RX ADMIN — CARVEDILOL 25 MG: 25 TABLET, FILM COATED ORAL at 10:04

## 2020-01-01 RX ADMIN — HYDROXYCHLOROQUINE SULFATE 400 MG: 200 TABLET, FILM COATED ORAL at 09:04

## 2020-02-03 NOTE — PROGRESS NOTES
[facility]   Hematology/Oncology          PROGRESS NOTE      Subjective:       Patient ID:   NAME: Asif Velez : 1940     79 y.o. male    Referring Doc: Yvonne  Other Physicians: Mario Baugh, Ramos; Oswaldo    Chief Complaint:  Anemia f/u    History of Present Illness:     Patient returns today for a regularly scheduled follow-up visit.  The patient is here with his wife. He is doing ok . He has some chronic arthritis issues in back; energy is chronically low. He last saw Dr Chacon with  on 2019, and Dr CONNIE Rojas with rheum on 2019.  He denies any CP, SOB, HA's or N/V . He is here with his wife. He uses cane to ambulate. He has been on iron pills.           ROS:   GEN: normal without any fever, night sweats or weight loss  HEENT: normal with no HA's, sore throat, stiff neck, changes in vision  CV: normal with no CP, SOB, PND, TUBBS or orthopnea  PULM: normal with no SOB, cough, hemoptysis, sputum or pleuritic pain  GI: normal with no abdominal pain, nausea, vomiting, constipation, diarrhea, melanotic stools, BRBPR, or hematemesis  : normal with no hematuria, dysuria  BREAST: normal with no mass, discharge, pain  SKIN: normal with no  erythema, bruising, or swelling; rash LUE resolved    Allergies:  Review of patient's allergies indicates:  No Known Allergies    Medications:    Current Outpatient Medications:     alfuzosin (UROXATRAL) 10 mg Tb24, Take 1 tablet (10 mg total) by mouth daily with breakfast., Disp: 30 tablet, Rfl: 11    amLODIPine (NORVASC) 10 MG tablet, Take 10 mg by mouth once daily., Disp: , Rfl:     aspirin 81 MG Chew, Take 325 mg by mouth. , Disp: , Rfl:     carvedilol (COREG) 25 MG tablet, Take by mouth., Disp: , Rfl:     cefUROXime (CEFTIN) 500 MG tablet, Take 1 tablet (500 mg total) by mouth 2 (two) times daily., Disp: 30 tablet, Rfl: 0    gabapentin (NEURONTIN) 100 MG capsule, Take by mouth., Disp: , Rfl:     hydrALAZINE (APRESOLINE) 10 MG tablet, Take 10 mg by mouth 2  (two) times daily., Disp: , Rfl:     hydrOXYzine HCl (ATARAX) 25 MG tablet, hydroxyzine HCl 25 mg tablet  TAKE 1-2 TABLETS BY MOUTH AT BEDTIME AS NEEDED FOR ITCH, Disp: , Rfl:     lidocaine (LIDODERM) 5 %, Remove & Discard patch within 12 hours or as directed by MDOne patch a day prn.Discard after 12 hrs, Disp: 15 patch, Rfl: 1    lisinopril (PRINIVIL,ZESTRIL) 40 MG tablet, Take by mouth., Disp: , Rfl:     magnesium chloride (SLOW-MAG ORAL), Take by mouth., Disp: , Rfl:     magnesium oxide (MAG-OX) 400 mg tablet, Take 400 mg by mouth once daily., Disp: , Rfl:     multivitamin with iron Tab, Take by mouth., Disp: , Rfl:     omeprazole 20 mg TbEC, Take by mouth., Disp: , Rfl:     sulfamethoxazole-trimethoprim 800-160mg (BACTRIM DS) 800-160 mg Tab, Take 1 tablet by mouth 2 (two) times daily., Disp: 30 tablet, Rfl: 0    sulfamethoxazole-trimethoprim 800-160mg (BACTRIM DS) 800-160 mg Tab, Take 1 tablet by mouth 2 (two) times daily., Disp: 30 tablet, Rfl: 0    VASCEPA 1 gram Cap, , Disp: , Rfl:     finasteride (PROSCAR) 5 mg tablet, Take 1 tablet (5 mg total) by mouth once daily., Disp: 90 tablet, Rfl: 3    tamsulosin (FLOMAX) 0.4 mg Cap, Take 1 capsule (0.4 mg total) by mouth after dinner., Disp: 90 capsule, Rfl: 3    PMHx/PSHx Updates:  See patient's last visit with me on 8/6/2019.  See H&P on 1/3/2017      Pathology:  No matching staging information was found for the patient.          Objective:     Vitals:  Blood pressure 132/66, pulse 70, temperature 98.7 °F (37.1 °C), temperature source Oral, resp. rate 18, weight 98.5 kg (217 lb 3.2 oz).    Physical Examination:   GEN: no apparent distress, comfortable; AAOx3  HEAD: atraumatic and normocephalic  EYES: no pallor, no icterus, PERRLA  ENT: OMM, no pharyngeal erythema, external ears WNL; no nasal discharge; no thrush  NECK: no masses, thyroid normal, trachea midline, no LAD/LN's, supple  CV: RRR with no murmur; normal pulse; normal S1 and S2; no pedal  edema  CHEST: Normal respiratory effort; CTAB; normal breath sounds; no wheeze or crackles  ABDOM: nontender and nondistended; soft; normal bowel sounds; no rebound/guarding  MUSC/Skeletal: positive arthropathy; uses cane  EXTREM: no clubbing, cyanosis, inflammation or swelling  SKIN: no  lesions, ulcers, petechiae or subcutaneous nodules; rash on LUE forearm stable (followed by Dr Beasley)  : no alvarez  NEURO: grossly intact; motor/sensory WNL; AAOx3; no tremors  PSYCH: normal mood, affect and behavior  LYMPH: normal cervical, supraclavicular, axillary and groin LN's            Labs:     1/31/2020    Lab Results   Component Value Date    WBC 7.1 01/31/2020    HGB 9.3 (L) 01/31/2020    HCT 29.5 (L) 01/31/2020    MCV 84 01/31/2020     01/31/2020     CMP  Sodium   Date Value Ref Range Status   01/31/2020 143 134 - 144 mmol/L Final     Potassium   Date Value Ref Range Status   01/31/2020 4.8 3.5 - 5.2 mmol/L Final     Chloride   Date Value Ref Range Status   01/31/2020 107 (H) 96 - 106 mmol/L Final     CO2   Date Value Ref Range Status   01/31/2020 19 (L) 20 - 29 mmol/L Final     Glucose   Date Value Ref Range Status   01/31/2020 93 65 - 99 mg/dL Final     BUN, Bld   Date Value Ref Range Status   01/31/2020 16 8 - 27 mg/dL Final     Creatinine   Date Value Ref Range Status   01/31/2020 1.62 (H) 0.76 - 1.27 mg/dL Final     Calcium   Date Value Ref Range Status   01/31/2020 9.7 8.6 - 10.2 mg/dL Final     Total Protein   Date Value Ref Range Status   01/31/2020 6.9 6.0 - 8.5 g/dL Final     Albumin   Date Value Ref Range Status   01/31/2020 3.9 3.7 - 4.7 g/dL Final     Comment:                   **Please note reference interval change**     Total Bilirubin   Date Value Ref Range Status   01/31/2020 <0.2 0.0 - 1.2 mg/dL Final     Alkaline Phosphatase   Date Value Ref Range Status   01/31/2020 64 39 - 117 IU/L Final     AST   Date Value Ref Range Status   01/31/2020 16 0 - 40 IU/L Final     ALT   Date Value Ref Range  Status   01/31/2020 10 0 - 44 IU/L Final     eGFR if non    Date Value Ref Range Status   01/31/2020 39 (L) >59 mL/min/1.73 Final     Lab Results   Component Value Date    ALT 10 01/31/2020    AST 16 01/31/2020    ALKPHOS 64 01/31/2020    BILITOT <0.2 01/31/2020          Lab Results   Component Value Date    IRON 67 01/31/2020    TIBC 291 01/31/2020    FERRITIN 56 01/31/2020           I have reviewed all available lab results and radiology reports.    Radiology/Diagnostic Studies:    CT abdom 6/14/2018    Assessment/Plan:   (1) 79 y.o. male with diagnosis of multifactorial anemia issues that has NCNC parameters  - underlying anemia of chronic disorders and anemia of chronic renal disease  - [facility] with Dr Ever Pizarro  - hgb is a little lower at 9.3   - he had some recent constipation issues with some BRPBR    (2) CAD, CHF and HTN - followed by Dr Baugh    (3) CRI - stage III - followed Dr Martin with Neph      1. Anemia, unspecified type     2. Anemia, chronic renal failure, stage 3 (moderate)     3. Anemia, chronic disease     4. Anemia due to multiple mechanisms           PLAN:  1. Continue to monitor labs monthly for now  2. F/u with PCP, , Card and Neph  3. Patient has plans to see GI in the near future  4. Continue oral iron  5. Recommend consideration for nephrology to start procit -will leave to their discretion  5. RTC 3-4 months  6. Fax note to Lupis Russell Ganji, [facility]                       Discussion:     I have explained all of the above in detail and the patient understands all of the current recommendation(s). I have answered all of their questions to the best of my ability and to their complete satisfaction.   The patient is to continue with the current management plan.    RTC in  6 months          Electronically signed by Naseem Vazquez MD

## 2020-03-30 NOTE — DISCHARGE INSTRUCTIONS
Return if you develop fever greater than 100.4, if your symptoms worsen or if you pass out   Your chest x ray did not show pneumonia  Take tylenol if you develop fevers
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
## 2020-04-01 NOTE — TELEPHONE ENCOUNTER
Notified daughter of results. She said he is doing well, being managed by his home health. Still having fevers. Encouraged f/u with Dr. Russell and to return to ED for any worsening symptoms.     Your test was POSITIVE for COVID-19 (coronavirus).     [facility]  Preventing the Spread of Coronavirus Disease 2019 in Homes and Residential Communities      Prevention steps for people with confirmed or suspected COVID-19 (including persons under investigation) who do not need to be hospitalized and people with confirmed COVID-19 who were hospitalized and determined to be medically stable to go home.    Your healthcare provider and public health staff will evaluate whether you can be cared for at home.   Stay home except to get medical care.   Separate yourself from other people and animals in your home   Call ahead before visiting your doctor.   Wear a facemask.   Cover your coughs and sneezes.   Clean your hands often.   Avoid sharing personal household items.   Clean all high-touch surfaces every day.   Monitor your symptoms. Seek prompt medical attention if your illness is worsening (e.g., difficulty breathing). Before seeking care, call your healthcare provider.   If you have a medical emergency and need to call 911, notify the dispatch personnel that you have, or are being evaluated for COVID-19. If possible, put on a facemask before emergency medical services arrive.   Discontinuing home isolation. Call your provider about guidance to discontinue home isolation.    Recommended precautions for household members, intimate partners, and caregivers in a nonhealthcare setting of a patient with symptomatic laboratory-confirmed COVID-19 or a patient under investigation.  Household members, intimate partners, and caregivers in a nonhealthcare setting may have close contact with a person with symptomatic, laboratory-confirmed COVID-19 or a person under investigation. Close  contacts should monitor their health; they should call their healthcare provider right away if they develop symptoms suggestive of COVID-19 (e.g., fever, cough, shortness of breath).    Close contacts should also follow these recommendations:   Make sure that you understand and can help the patient follow their healthcare provider's instructions for medication(s) and care. You should help the patient with basic needs in the home and provide support for getting groceries, prescriptions, and other personal needs.   Monitor the patient's symptoms. If the patient is getting sicker, call his or her healthcare provider and tell them that the patient has laboratory-confirmed COVID-19. This will help the healthcare provider's office take steps to keep other people in the office or waiting room from getting infected. Ask the healthcare provider to call the local or [facility] health department for additional guidance. If the patient has a medical emergency and you need to call 911, notify the dispatch personnel that the patient has, or is being evaluated for COVID-19.   Household members should stay in another room or be  from the patient as much as possible. Household members should use a separate bedroom and bathroom, if available.   Prohibit visitors who do not have an essential need to be in the home.   Household members should care for any pets in the home. Do not handle pets or other animals while sick.   Make sure that shared spaces in the home have good air flow, such as by an air conditioner or an opened window, weather permitting.   Perform hand hygiene frequently. Wash your hands often with soap and water for at least 20 seconds or use an alcohol-based hand  that contains 60 to 95% alcohol, covering all surfaces of your hands and rubbing them together until they feel dry. Soap and water should be used preferentially if hands are visibly dirty.   Avoid touching your eyes, nose, and mouth with  unwashed hands.   The patient should wear a facemask when you are around other people. If the patient is not able to wear a facemask (for example, because it causes trouble breathing), you, as the caregiver should wear a mask when you are in the same room as the patient.   Wear a disposable facemask and gloves when you touch or have contact with the patient's blood, stool, or body fluids, such as saliva, sputum, nasal mucus, vomit, urine.  o Throw out disposable facemasks and gloves after using them. Do not reuse.  o When removing personal protective equipment, first remove and dispose of gloves. Then, immediately clean your hands with soap and water or alcohol-based hand . Next, remove and dispose of facemask, and immediately clean your hands again with soap and water or alcohol-based hand .   Avoid sharing household items with the patient. You should not share dishes, drinking glasses, cups, eating utensils, towels, bedding, or other items. After the patient uses these items, you should wash them thoroughly (see below Wash laundry thoroughly).   Clean all high-touch surfaces, such as counters, tabletops, doorknobs, bathroom fixtures, toilets, phones, keyboards, tablets, and bedside tables, every day. Also, clean any surfaces that may have blood, stool, or body fluids on them.   Use a household cleaning spray or wipe, according to the label instructions. Labels contain instructions for safe and effective use of the cleaning product including precautions you should take when applying the product, such as wearing gloves and making sure you have good ventilation during use of the product.   Wash laundry thoroughly.  o Immediately remove and wash clothes or bedding that have blood, stool, or body fluids on them.  o Wear disposable gloves while handling soiled items and keep soiled items away from your body. Clean your hands (with soap and water or an alcohol-based hand ) immediately  after removing your gloves.  o Read and follow directions on labels of laundry or clothing items and detergent. In general, using a normal laundry detergent according to washing machine instructions and dry thoroughly using the warmest temperatures recommended on the clothing label.   Place all used disposable gloves, facemasks, and other contaminated items in a lined container before disposing of them with other household waste. Clean your hands (with soap and water or an alcohol-based hand ) immediately after handling these items. Soap and water should be used preferentially if hands are visibly dirty.   Discuss any additional questions with your state or local health department or healthcare provider. Check available hours when contacting your local health department.    For more information see CDC link below.      https://www.cdc.gov/coronavirus/2019-ncov/hcp/guidance-prevent-spread.html#precautions              If your symptoms worsen or if you have any other concerns, please contact [facility] at [phone].     Sincerely,     Vikki Choe RN

## 2020-04-04 PROBLEM — U07.1 COVID-19 VIRUS DETECTED: Status: ACTIVE | Noted: 2020-01-01

## 2020-04-04 PROBLEM — J18.9 PNEUMONIA OF BOTH LOWER LOBES DUE TO INFECTIOUS ORGANISM: Status: ACTIVE | Noted: 2020-01-01

## 2020-04-04 PROBLEM — J96.01 ACUTE RESPIRATORY FAILURE WITH HYPOXIA: Status: ACTIVE | Noted: 2020-01-01

## 2020-04-04 PROBLEM — I10 HYPERTENSION: Status: ACTIVE | Noted: 2020-01-01

## 2020-04-04 NOTE — PROGRESS NOTES
Pharmacist Renal Dose Adjustment Note    Asif Velez is a 79 y.o. male being treated with the medication LOVENOX    Patient Data:    Vital Signs (Most Recent):  Temp: 98.8 °F (37.1 °C) (04/04/20 1439)  Pulse: 94 (04/04/20 1730)  Resp: 14 (04/04/20 1730)  BP: 132/77 (04/04/20 1730)  SpO2: (!) 91 % (04/04/20 1730)   Vital Signs (72h Range):  Temp:  [98.8 °F (37.1 °C)]   Pulse:  [84-94]   Resp:  [14-35]   BP: (120-132)/(63-77)   SpO2:  [84 %-94 %]      Recent Labs   Lab 03/30/20  1834 04/04/20  1450   CREATININE 2.3* 1.9*     Serum creatinine: 1.9 mg/dL (H) 04/04/20 1450  Estimated creatinine clearance: 37.3 mL/min (A)    Medication:LOVENOX dose: 30MG frequency Q24H will be changed to medication:LOVENOX dose:40MG frequency:Q24H    Pharmacist's Name: Zi Snow  Pharmacist's Extension: [phone]

## 2020-04-04 NOTE — ED PROVIDER NOTES
Encounter Date: 2020       History     Chief Complaint   Patient presents with    Shortness of Breath     Positive COVID     Patient with reported shortness of breath was seen in the emergency department on the  diagnosed with corona virus was released with Zithromax and Tessalon Perles states the last few days his breathing has worsened arrival emergency department patient's initial oxygen saturation was 86% on room air patient reports continued cough that is nonproductive reports shortness of breath and at rest and dyspnea on exertion he denies any continued fever no nausea vomiting or diarrhea states his appetite has been okay there is no sick contacts at home        Review of patient's allergies indicates:  No Known Allergies  Past Medical History:   Diagnosis Date    Anemia     Anemia due to multiple mechanisms 2018    Anemia, chronic renal failure, stage 3 (moderate) 2018    Disorder of kidney and ureter     GERD (gastroesophageal reflux disease)     Gout     Hyperlipidemia     Hypertension      No past surgical history on file.  Family History   Problem Relation Age of Onset    Congenital heart disease Mother     Arthritis Mother     Asthma Mother      Social History     Tobacco Use    Smoking status: Former Smoker     Last attempt to quit: 1992     Years since quittin.8    Smokeless tobacco: Never Used   Substance Use Topics    Alcohol use: No    Drug use: No     Review of Systems   Constitutional: Positive for fatigue. Negative for appetite change, chills and fever.   HENT: Positive for congestion.    Eyes: Negative.    Respiratory: Positive for cough, chest tightness and shortness of breath. Negative for wheezing and stridor.    Cardiovascular: Negative for chest pain, palpitations and leg swelling.   Gastrointestinal: Negative for abdominal pain, diarrhea and nausea.   Endocrine: Negative.    Genitourinary: Negative.    Musculoskeletal: Negative.     Allergic/Immunologic: Negative.    Neurological: Negative.    Hematological: Negative.    Psychiatric/Behavioral: Negative.        Physical Exam     Initial Vitals [04/04/20 1439]   BP Pulse Resp Temp SpO2   132/74 92 (!) 26 98.8 °F (37.1 °C) (!) 84 %      MAP       --         Physical Exam    Constitutional: He appears well-developed and well-nourished. He appears ill. He appears distressed.   HENT:   Head: Normocephalic and atraumatic.   Mouth/Throat: Oropharynx is clear and moist.   Eyes: EOM are normal. Pupils are equal, round, and reactive to light.   Neck: Normal range of motion. Neck supple.   Cardiovascular: Normal rate, regular rhythm and normal heart sounds.   Pulmonary/Chest: Tachypnea noted. He is in respiratory distress. He has decreased breath sounds. He has no wheezes. He has no rhonchi. He has rales.   Abdominal: Soft. Bowel sounds are normal. He exhibits no distension. There is no tenderness.   Musculoskeletal: Normal range of motion.        Right lower leg: Normal.        Left lower leg: Normal.   Neurological: He is alert and oriented to person, place, and time.   Skin: Skin is dry. Capillary refill takes less than 2 seconds.   Psychiatric: He has a normal mood and affect. His behavior is normal.         ED Course   Procedures  Labs Reviewed   LACTIC ACID, PLASMA - Abnormal; Notable for the following components:       Result Value    Lactate (Lactic Acid) 2.3 (*)     All other components within normal limits    Narrative:     LA critical result(s) repeated. Called and verbal readback obtained   from Carole Sy RN/ED by SIVAN 04/04/2020 15:51   CBC W/ AUTO DIFFERENTIAL - Abnormal; Notable for the following components:    RBC 4.36 (*)     Hemoglobin 10.7 (*)     Hematocrit 34.0 (*)     Mean Corpuscular Volume 78 (*)     Mean Corpuscular Hemoglobin 24.5 (*)     Mean Corpuscular Hemoglobin Conc 31.5 (*)     RDW 19.2 (*)     Platelets 358 (*)     All other components within normal limits    COMPREHENSIVE METABOLIC PANEL - Abnormal; Notable for the following components:    CO2 20 (*)     Glucose 125 (*)     BUN, Bld 36 (*)     Creatinine 1.9 (*)     Albumin 3.0 (*)      (*)     eGFR if  37.7 (*)     eGFR if non  32.6 (*)     All other components within normal limits   C-REACTIVE PROTEIN - Abnormal; Notable for the following components:    CRP 28.94 (*)     All other components within normal limits   LACTATE DEHYDROGENASE - Abnormal; Notable for the following components:     (*)     All other components within normal limits   CK - Abnormal; Notable for the following components:     (*)     All other components within normal limits   PROCALCITONIN - Abnormal; Notable for the following components:    Procalcitonin 0.69 (*)     All other components within normal limits   FERRITIN   TROPONIN I   B-TYPE NATRIURETIC PEPTIDE        ECG Results          EKG 12-lead (In process)  Result time 04/04/20 16:15:14    In process by Interface, Lab In [facility] (04/04/20 16:15:14)                 Narrative:    Test Reason : R68.89,    Vent. Rate : 088 BPM     Atrial Rate : 088 BPM     P-R Int : 154 ms          QRS Dur : 090 ms      QT Int : 372 ms       P-R-T Axes : 012 -34 014 degrees     QTc Int : 450 ms    Normal sinus rhythm  Possible Left atrial enlargement  Left axis deviation  LVH  Abnormal ECG  When compared with ECG of 30-MAR-2020 16:05,  Nonspecific T wave abnormality no longer evident in Lateral leads    Referred By: [email or web address]   SELF           Confirmed By:                   In process by Interface, Lab In [facility] (04/04/20 15:30:40)                 Narrative:    Test Reason : R68.89,    Vent. Rate : 088 BPM     Atrial Rate : 088 BPM     P-R Int : 154 ms          QRS Dur : 090 ms      QT Int : 372 ms       P-R-T Axes : 012 -34 014 degrees     QTc Int : 450 ms    Normal sinus rhythm  Possible Left atrial enlargement  Left axis deviation  LVH  Abnormal  ECG  When compared with ECG of 30-MAR-2020 16:05,  Nonspecific T wave abnormality no longer evident in Lateral leads    Referred By: [email or web address]   SELF           Confirmed By:                             Imaging Results          X-Ray Chest AP Portable (Final result)  Result time 04/04/20 15:50:22    Final result by Arcadio Reyes MD (04/04/20 15:50:22)                 Impression:      Borderline cardiomegaly with development of mild linear and ground-glass alveolar opacities in bilateral lung bases either reflecting infectious or inflammatory pneumonia, atelectasis, or some combination thereof.  Pulmonary edema felt less likely.      Electronically signed by: Arcadio Reyes MD  Date:    04/04/2020  Time:    15:50             Narrative:    EXAMINATION:  XR CHEST AP PORTABLE    CLINICAL HISTORY:  Suspected Covid-19 Virus Infection;  dyspnea    FINDINGS:  Portable chest at 1527 compared with 03/30/2020 shows unchanged borderline enlarged cardiac silhouette size with normal mediastinal contours.    Since the prior exam, scattered bibasilar airspace opacities with linear and ground-glass appearance have developed.  Central pulmonary vasculature remains normal.  No pleural effusion.  No acute osseous abnormality.                                 Medical Decision Making:   ED Management:  Patient here with reported reported corona virus infection with worsening dyspnea physical examination and laboratory evaluation consistent with corona virus infection will discuss with hospitalist for admission patient placed on oxygen at arrival emergency department with improvement in his oxygen saturations to 94% on 4 L nasal cannula patient states his breathing is better with the oxygen                                 Clinical Impression:       ICD-10-CM ICD-9-CM   1. Pneumonia of both lower lobes due to infectious organism J18.1 483.8   2. Suspected Covid-19 Virus Infection R68.89                                 Tam Sherwood,  MD  04/04/20 5023

## 2020-04-04 NOTE — ASSESSMENT & PLAN NOTE
COVID 19 bilateral pneumonia - isolation and Airborne precautions.   Ceftriaxone, Azithromycin and hydroxychlorquine.   COVID 19 testing pending.   Monitor with inflammatory markers including CRP, D-Dimer and Ferritin.   Consider ID consultation for need of additional medications including ramdesivir (if patient requires ventilator) and tocilizumab (if suspicion of Cytokine storm).  Monitor QTc on telemetry. If greater than 25% increase consider doing EKG for confirmation. If above 500, consider stopping medications. Avoid other QTc prolongation drugs. Monitor magnesium.  QTc on admit 450

## 2020-04-04 NOTE — H&P
[facility] Medicine  History & Physical    Patient Name: Asif Velez  MRN: [id number]  Admission Date: 4/4/2020  Attending Physician: Kathrine Tyler MD   Primary Care Provider: Obi Russell MD         Patient information was obtained from patient and ER records.     Subjective:     Principal Problem:Acute respiratory failure with hypoxia    Chief Complaint:   Chief Complaint   Patient presents with    Shortness of Breath     Positive COVID        HPI: History was obtained from the patient and ER physician Sign-out. Patient presented with cough, shortness of breath and fever x 5 days. His symptoms are progressively getting worse. He also reports associated fatigue. He denies nausea, vomiting, diarrhea. Denies any alleviating or worsening factors. Reports contact with his grand daughter who was +COVID 19. Patient was seen here at [facility] ED on 3/30 and treated with azithromycin and tessalon perles with no improvement. Patient decided to come to the ER for further evaluation.     In the emergency room, patient was saturating in 80s on room air. He required 5L oxygen to bring the saturation in 90s. His saturation again dropped to 80s he was placed on venti mask. Patient CBC was unremarkable. CMP was unremarkable. CRP was elevated to 28. BNP and troponin was negative. Reviewed EKG and does not show new ischemic changes and no QTC prolongation. CXR shows bilateral infiltratres consistent with COVID 19.     Decision to admit was taken and patient was informed about the plan of care.     Past Medical History:   Diagnosis Date    Anemia     Anemia due to multiple mechanisms 8/2/2018    Anemia, chronic renal failure, stage 3 (moderate) 8/2/2018    Disorder of kidney and ureter     GERD (gastroesophageal reflux disease)     Gout     Hyperlipidemia     Hypertension        No past surgical history on file.    Review of patient's allergies indicates:  No Known Allergies    No current  facility-administered medications on file prior to encounter.      Current Outpatient Medications on File Prior to Encounter   Medication Sig    acetaminophen (TYLENOL) 325 MG tablet Take 325 mg by mouth every 6 (six) hours as needed for Pain.    amLODIPine (NORVASC) 10 MG tablet Take 10 mg by mouth once daily.    aspirin 81 MG Chew Take 325 mg by mouth.     azithromycin (Z-RIKI) 250 MG tablet Take 250 mg by mouth once daily.     benzonatate (TESSALON) 100 MG capsule Take 1 capsule (100 mg total) by mouth 2 (two) times daily. for 10 days    carvedilol (COREG) 25 MG tablet Take 25 mg by mouth 2 (two) times daily with meals.     fluticasone propionate (FLONASE) 50 mcg/actuation nasal spray 1 spray by Each Nostril route once daily.     gabapentin (NEURONTIN) 100 MG capsule Take 100 mg by mouth 2 (two) times daily.     hydrALAZINE (APRESOLINE) 10 MG tablet Take 10 mg by mouth 2 (two) times daily.    HYDROcodone-acetaminophen (NORCO)  mg per tablet Take 1 tablet by mouth every 6 (six) hours as needed.     hydrOXYzine HCl (ATARAX) 25 MG tablet Take 25 mg by mouth 2 (two) times daily.     lidocaine (LIDODERM) 5 % Remove & Discard patch within 12 hours or as directed by Srini patch a day prn.Discard after 12 hrs (Patient taking differently: Place 1 patch onto the skin once daily. Remove & Discard patch within 12 hours or as directed by Srini patch a day prn.Discard after 12 hrs)    loratadine (CLARITIN) 10 mg tablet Take 10 mg by mouth once daily.    multivitamin with iron Tab Take 1 tablet by mouth once daily.     omeprazole 20 mg TbEC Take 20 mg by mouth once daily.     alfuzosin (UROXATRAL) 10 mg Tb24 Take 1 tablet (10 mg total) by mouth daily with breakfast.    cefUROXime (CEFTIN) 500 MG tablet Take 1 tablet (500 mg total) by mouth 2 (two) times daily.    lisinopril (PRINIVIL,ZESTRIL) 40 MG tablet Take 20 mg by mouth once daily.     magnesium chloride (SLOW-MAG ORAL) Take 1 tablet by mouth once  daily.     magnesium oxide (MAG-OX) 400 mg tablet Take 400 mg by mouth once daily.    sulfamethoxazole-trimethoprim 800-160mg (BACTRIM DS) 800-160 mg Tab Take 1 tablet by mouth 2 (two) times daily.    VASCEPA 1 gram Cap Take 1 g by mouth once daily.     [DISCONTINUED] finasteride (PROSCAR) 5 mg tablet Take 1 tablet (5 mg total) by mouth once daily.    [DISCONTINUED] sulfamethoxazole-trimethoprim 800-160mg (BACTRIM DS) 800-160 mg Tab Take 1 tablet by mouth 2 (two) times daily.    [DISCONTINUED] tamsulosin (FLOMAX) 0.4 mg Cap Take 1 capsule (0.4 mg total) by mouth after dinner.     Family History     Problem Relation (Age of Onset)    Arthritis Mother    Asthma Mother    Congenital heart disease Mother        Tobacco Use    Smoking status: Former Smoker     Last attempt to quit: 1992     Years since quittin.8    Smokeless tobacco: Never Used   Substance and Sexual Activity    Alcohol use: No    Drug use: No    Sexual activity: Not on file     Review of Systems   Constitutional: Positive for activity change, appetite change, fatigue and fever.   HENT: Positive for congestion. Negative for sinus pressure, sinus pain and sore throat.    Eyes: Negative for photophobia and visual disturbance.   Respiratory: Positive for cough and shortness of breath. Negative for chest tightness.    Cardiovascular: Negative for chest pain, palpitations and leg swelling.   Gastrointestinal: Positive for nausea. Negative for abdominal pain, diarrhea and vomiting.   Genitourinary: Positive for difficulty urinating. Negative for dysuria and hematuria.   Musculoskeletal: Positive for myalgias.   Skin: Negative for color change.   Neurological: Positive for weakness. Negative for dizziness, syncope and light-headedness.   Psychiatric/Behavioral: Negative for confusion.     Objective:     Vital Signs (Most Recent):  Temp: 98.8 °F (37.1 °C) (20 1439)  Pulse: 94 (20 1730)  Resp: 14 (20 1730)  BP: 132/77  (04/04/20 1730)  SpO2: (!) 91 % (04/04/20 1730) Vital Signs (24h Range):  Temp:  [98.8 °F (37.1 °C)] 98.8 °F (37.1 °C)  Pulse:  [84-94] 94  Resp:  [14-35] 14  SpO2:  [84 %-94 %] 91 %  BP: (120-132)/(63-77) 132/77     Weight: 99.8 kg (220 lb)  Body mass index is 30.68 kg/m².    Physical Exam   Constitutional: He is oriented to person, place, and time. He appears well-developed and well-nourished. He appears ill.   HENT:   Head: Normocephalic and atraumatic.   Eyes: Pupils are equal, round, and reactive to light. Conjunctivae and EOM are normal.   Neck: Normal range of motion. Neck supple.   Cardiovascular: Normal rate, regular rhythm, normal heart sounds and intact distal pulses.   Pulmonary/Chest: Breath sounds normal. Tachypnea noted. He is in respiratory distress. He exhibits no tenderness.   Abdominal: Soft. Normal appearance and bowel sounds are normal. There is no tenderness.   Neurological: He is alert and oriented to person, place, and time. GCS eye subscore is 4. GCS verbal subscore is 5. GCS motor subscore is 6.   Skin: Skin is warm, dry and intact. Capillary refill takes less than 2 seconds.   Psychiatric: He has a normal mood and affect. His behavior is normal. His speech is delayed.         CRANIAL NERVES     CN III, IV, VI   Pupils are equal, round, and reactive to light.  Extraocular motions are normal.        Significant Labs:   CBC:   Recent Labs   Lab 04/04/20  1450   WBC 9.61   HGB 10.7*   HCT 34.0*   *     CMP:   Recent Labs   Lab 04/04/20  1450      K 4.5      CO2 20*   *   BUN 36*   CREATININE 1.9*   CALCIUM 8.7   PROT 8.2   ALBUMIN 3.0*   BILITOT 0.7   ALKPHOS 78   *   ALT 36   ANIONGAP 15   EGFRNONAA 32.6*     Cardiac Markers:   Recent Labs   Lab 04/04/20  1450   BNP 91     Lactic Acid:   Recent Labs   Lab 04/04/20  1450   LACTATE 2.3*     Troponin:   Recent Labs   Lab 04/04/20  1450   TROPONINI 0.038     Urine Studies: No results for input(s): TAYO,  APPEARANCEUA, PHUR, SPECGRAV, PROTEINUA, GLUCUA, KETONESU, BILIRUBINUA, OCCULTUA, NITRITE, UROBILINOGEN, LEUKOCYTESUR, RBCUA, WBCUA, BACTERIA, SQUAMEPITHEL, HYALINECASTS in the last 48 hours.    Invalid input(s): LORENZA    Significant Imaging: I have reviewed and interpreted all pertinent imaging results/findings within the past 24 hours.   X-ray Chest Ap Portable    Result Date: 4/4/2020  EXAMINATION: XR CHEST AP PORTABLE CLINICAL HISTORY: Suspected Covid-19 Virus Infection;  dyspnea FINDINGS: Portable chest at 1527 compared with 03/30/2020 shows unchanged borderline enlarged cardiac silhouette size with normal mediastinal contours. Since the prior exam, scattered bibasilar airspace opacities with linear and ground-glass appearance have developed.  Central pulmonary vasculature remains normal.  No pleural effusion.  No acute osseous abnormality.     Borderline cardiomegaly with development of mild linear and ground-glass alveolar opacities in bilateral lung bases either reflecting infectious or inflammatory pneumonia, atelectasis, or some combination thereof.  Pulmonary edema felt less likely. Electronically signed by: Arcadio Reyes MD Date:    04/04/2020 Time:    15:50    X-ray Chest Ap Portable    Result Date: 3/30/2020  EXAMINATION: XR CHEST AP PORTABLE CLINICAL HISTORY: Cough , worsening for 1 month FINDINGS: Portable chest at 1604 compared with 10/31/2016 shows cardiac silhouette size at the upper limits of normal with normal mediastinal contours. Lungs are clear. Pulmonary vasculature is normal. No acute osseous abnormality.     No acute cardiopulmonary abnormality. Electronically signed by: Arcadio Reyes MD Date:    03/30/2020 Time:    16:17    Assessment/Plan:     * Acute respiratory failure with hypoxia  1. Acute Hypoxemic Respiratory Failure - Admit to ICU. Supplemental oxygen to maintain goal of 90% saturation.   Avoid aerosol generating procedures.   Pulmonary consult as patient is requiring higher  oxygenation            Pneumonia of both lower lobes due to infectious organism  COVID 19 bilateral pneumonia - isolation and Airborne precautions.   Ceftriaxone, Azithromycin and hydroxychlorquine.   COVID 19 testing pending.   Monitor with inflammatory markers including CRP, D-Dimer and Ferritin.   Consider ID consultation for need of additional medications including ramdesivir (if patient requires ventilator) and tocilizumab (if suspicion of Cytokine storm).  Monitor QTc on telemetry. If greater than 25% increase consider doing EKG for confirmation. If above 500, consider stopping medications. Avoid other QTc prolongation drugs. Monitor magnesium.  QTc on admit 450      COVID-19 virus detected  Treatment as above      Hypertension  Continue home meds      Anemia, chronic disease  No signs of acute blood loss  Follow H/H      VTE Risk Mitigation (From admission, onward)         Ordered     enoxaparin injection 40 mg  Daily      04/04/20 1738     IP VTE HIGH RISK PATIENT  Once      04/04/20 1738              Critical care time 45 minutes     Keely Hernandez NP  Department of Hospital Medicine   [facility]

## 2020-04-04 NOTE — SUBJECTIVE & OBJECTIVE
Past Medical History:   Diagnosis Date    Anemia     Anemia due to multiple mechanisms 8/2/2018    Anemia, chronic renal failure, stage 3 (moderate) 8/2/2018    Disorder of kidney and ureter     GERD (gastroesophageal reflux disease)     Gout     Hyperlipidemia     Hypertension        No past surgical history on file.    Review of patient's allergies indicates:  No Known Allergies    No current facility-administered medications on file prior to encounter.      Current Outpatient Medications on File Prior to Encounter   Medication Sig    acetaminophen (TYLENOL) 325 MG tablet Take 325 mg by mouth every 6 (six) hours as needed for Pain.    amLODIPine (NORVASC) 10 MG tablet Take 10 mg by mouth once daily.    aspirin 81 MG Chew Take 325 mg by mouth.     azithromycin (Z-RIKI) 250 MG tablet Take 250 mg by mouth once daily.     benzonatate (TESSALON) 100 MG capsule Take 1 capsule (100 mg total) by mouth 2 (two) times daily. for 10 days    carvedilol (COREG) 25 MG tablet Take 25 mg by mouth 2 (two) times daily with meals.     fluticasone propionate (FLONASE) 50 mcg/actuation nasal spray 1 spray by Each Nostril route once daily.     gabapentin (NEURONTIN) 100 MG capsule Take 100 mg by mouth 2 (two) times daily.     hydrALAZINE (APRESOLINE) 10 MG tablet Take 10 mg by mouth 2 (two) times daily.    HYDROcodone-acetaminophen (NORCO)  mg per tablet Take 1 tablet by mouth every 6 (six) hours as needed.     hydrOXYzine HCl (ATARAX) 25 MG tablet Take 25 mg by mouth 2 (two) times daily.     lidocaine (LIDODERM) 5 % Remove & Discard patch within 12 hours or as directed by Srini patch a day prn.Discard after 12 hrs (Patient taking differently: Place 1 patch onto the skin once daily. Remove & Discard patch within 12 hours or as directed by Srini patch a day prn.Discard after 12 hrs)    loratadine (CLARITIN) 10 mg tablet Take 10 mg by mouth once daily.    multivitamin with iron Tab Take 1 tablet by mouth once  daily.     omeprazole 20 mg TbEC Take 20 mg by mouth once daily.     alfuzosin (UROXATRAL) 10 mg Tb24 Take 1 tablet (10 mg total) by mouth daily with breakfast.    cefUROXime (CEFTIN) 500 MG tablet Take 1 tablet (500 mg total) by mouth 2 (two) times daily.    lisinopril (PRINIVIL,ZESTRIL) 40 MG tablet Take 20 mg by mouth once daily.     magnesium chloride (SLOW-MAG ORAL) Take 1 tablet by mouth once daily.     magnesium oxide (MAG-OX) 400 mg tablet Take 400 mg by mouth once daily.    sulfamethoxazole-trimethoprim 800-160mg (BACTRIM DS) 800-160 mg Tab Take 1 tablet by mouth 2 (two) times daily.    VASCEPA 1 gram Cap Take 1 g by mouth once daily.     [DISCONTINUED] finasteride (PROSCAR) 5 mg tablet Take 1 tablet (5 mg total) by mouth once daily.    [DISCONTINUED] sulfamethoxazole-trimethoprim 800-160mg (BACTRIM DS) 800-160 mg Tab Take 1 tablet by mouth 2 (two) times daily.    [DISCONTINUED] tamsulosin (FLOMAX) 0.4 mg Cap Take 1 capsule (0.4 mg total) by mouth after dinner.     Family History     Problem Relation (Age of Onset)    Arthritis Mother    Asthma Mother    Congenital heart disease Mother        Tobacco Use    Smoking status: Former Smoker     Last attempt to quit: 1992     Years since quittin.8    Smokeless tobacco: Never Used   Substance and Sexual Activity    Alcohol use: No    Drug use: No    Sexual activity: Not on file     Review of Systems   Constitutional: Positive for activity change, appetite change, fatigue and fever.   HENT: Positive for congestion. Negative for sinus pressure, sinus pain and sore throat.    Eyes: Negative for photophobia and visual disturbance.   Respiratory: Positive for cough and shortness of breath. Negative for chest tightness.    Cardiovascular: Negative for chest pain, palpitations and leg swelling.   Gastrointestinal: Positive for nausea. Negative for abdominal pain, diarrhea and vomiting.   Genitourinary: Positive for difficulty urinating. Negative  for dysuria and hematuria.   Musculoskeletal: Positive for myalgias.   Skin: Negative for color change.   Neurological: Positive for weakness. Negative for dizziness, syncope and light-headedness.   Psychiatric/Behavioral: Negative for confusion.     Objective:     Vital Signs (Most Recent):  Temp: 98.8 °F (37.1 °C) (04/04/20 1439)  Pulse: 94 (04/04/20 1730)  Resp: 14 (04/04/20 1730)  BP: 132/77 (04/04/20 1730)  SpO2: (!) 91 % (04/04/20 1730) Vital Signs (24h Range):  Temp:  [98.8 °F (37.1 °C)] 98.8 °F (37.1 °C)  Pulse:  [84-94] 94  Resp:  [14-35] 14  SpO2:  [84 %-94 %] 91 %  BP: (120-132)/(63-77) 132/77     Weight: 99.8 kg (220 lb)  Body mass index is 30.68 kg/m².    Physical Exam   Constitutional: He is oriented to person, place, and time. He appears well-developed and well-nourished. He appears ill.   HENT:   Head: Normocephalic and atraumatic.   Eyes: Pupils are equal, round, and reactive to light. Conjunctivae and EOM are normal.   Neck: Normal range of motion. Neck supple.   Cardiovascular: Normal rate, regular rhythm, normal heart sounds and intact distal pulses.   Pulmonary/Chest: Breath sounds normal. Tachypnea noted. He is in respiratory distress. He exhibits no tenderness.   Abdominal: Soft. Normal appearance and bowel sounds are normal. There is no tenderness.   Neurological: He is alert and oriented to person, place, and time. GCS eye subscore is 4. GCS verbal subscore is 5. GCS motor subscore is 6.   Skin: Skin is warm, dry and intact. Capillary refill takes less than 2 seconds.   Psychiatric: He has a normal mood and affect. His behavior is normal. His speech is delayed.         CRANIAL NERVES     CN III, IV, VI   Pupils are equal, round, and reactive to light.  Extraocular motions are normal.        Significant Labs:   CBC:   Recent Labs   Lab 04/04/20  1450   WBC 9.61   HGB 10.7*   HCT 34.0*   *     CMP:   Recent Labs   Lab 04/04/20  1450      K 4.5      CO2 20*   *   BUN  36*   CREATININE 1.9*   CALCIUM 8.7   PROT 8.2   ALBUMIN 3.0*   BILITOT 0.7   ALKPHOS 78   *   ALT 36   ANIONGAP 15   EGFRNONAA 32.6*     Cardiac Markers:   Recent Labs   Lab 04/04/20  1450   BNP 91     Lactic Acid:   Recent Labs   Lab 04/04/20  1450   LACTATE 2.3*     Troponin:   Recent Labs   Lab 04/04/20  1450   TROPONINI 0.038     Urine Studies: No results for input(s): COLORU, APPEARANCEUA, PHUR, SPECGRAV, PROTEINUA, GLUCUA, KETONESU, BILIRUBINUA, OCCULTUA, NITRITE, UROBILINOGEN, LEUKOCYTESUR, RBCUA, WBCUA, BACTERIA, SQUAMEPITHEL, HYALINECASTS in the last 48 hours.    Invalid input(s): LORENZA    Significant Imaging: I have reviewed and interpreted all pertinent imaging results/findings within the past 24 hours.   X-ray Chest Ap Portable    Result Date: 4/4/2020  EXAMINATION: XR CHEST AP PORTABLE CLINICAL HISTORY: Suspected Covid-19 Virus Infection;  dyspnea FINDINGS: Portable chest at 1527 compared with 03/30/2020 shows unchanged borderline enlarged cardiac silhouette size with normal mediastinal contours. Since the prior exam, scattered bibasilar airspace opacities with linear and ground-glass appearance have developed.  Central pulmonary vasculature remains normal.  No pleural effusion.  No acute osseous abnormality.     Borderline cardiomegaly with development of mild linear and ground-glass alveolar opacities in bilateral lung bases either reflecting infectious or inflammatory pneumonia, atelectasis, or some combination thereof.  Pulmonary edema felt less likely. Electronically signed by: Arcadio Reyes MD Date:    04/04/2020 Time:    15:50    X-ray Chest Ap Portable    Result Date: 3/30/2020  EXAMINATION: XR CHEST AP PORTABLE CLINICAL HISTORY: Cough , worsening for 1 month FINDINGS: Portable chest at 1604 compared with 10/31/2016 shows cardiac silhouette size at the upper limits of normal with normal mediastinal contours. Lungs are clear. Pulmonary vasculature is normal. No acute osseous  abnormality.     No acute cardiopulmonary abnormality. Electronically signed by: Arcadio Reyes MD Date:    03/30/2020 Time:    16:17

## 2020-04-04 NOTE — HPI
History was obtained from the patient and ER physician Sign-out. Patient presented with cough, shortness of breath and fever x 5 days. His symptoms are progressively getting worse. He also reports associated fatigue. He denies nausea, vomiting, diarrhea. Denies any alleviating or worsening factors. Reports contact with his grand daughter who was +COVID 19. Patient was seen here at [facility] ED on 3/30 and treated with azithromycin and tessalon perles with no improvement. Patient decided to come to the ER for further evaluation.     In the emergency room, patient was saturating in 80s on room air. He required 5L oxygen to bring the saturation in 90s. His saturation again dropped to 80s he was placed on venti mask. Patient CBC was unremarkable. CMP was unremarkable. CRP was elevated to 28. BNP and troponin was negative. Reviewed EKG and does not show new ischemic changes and no QTC prolongation. CXR shows bilateral infiltratres consistent with COVID 19.     Decision to admit was taken and patient was informed about the plan of care.

## 2020-04-04 NOTE — ASSESSMENT & PLAN NOTE
1. Acute Hypoxemic Respiratory Failure - Admit to ICU. Supplemental oxygen to maintain goal of 90% saturation.   Avoid aerosol generating procedures.   Pulmonary consult as patient is requiring higher oxygenation

## 2020-04-05 NOTE — PROGRESS NOTES
"[facility] Medicine Progress Note    Patient Name: Asif Velez MRN: [id number]   Patient Class: IP- Inpatient  Length of Stay: 1   Admission Date: 4/4/2020  2:41 PM Attending Physician: Marleny Rowell MD   Primary Care Provider: Obi Russell MD Face-to-Face encounter date: 04/05/2020   Chief Complaint: Shortness of Breath (Positive COVID)        Subjective:    Hospital course:  Patient was admitted for hypoxic respiratory failure due to COVID pneumonia.      Interval History: .No concerns/issues overnight reported by the patient or the nursing staff.  Patient seen and examined at bedside  Reports loss of appetite  Saturating 90% on 5 L afebrile.  CRP 28 .9, with mild CPK and LD ,Transaminase elevated,  Mild elevation of procalcitonin  On hydroxychloroquine and azitro  Pulmonology consult appreciated, Prescovix added    Downgraded to telemetry    Review of Systems All other Review of Systems were found to be negative expect for that mentioned already in HPI.     Objective:   Physical Exam  /81 (BP Location: Right arm, Patient Position: Lying)   Pulse 75   Temp 99.8 °F (37.7 °C) (Oral)   Resp 20   Ht 5' 11" (1.803 m)   Wt 90.9 kg (200 lb 6.4 oz)   SpO2 (!) 90%   BMI 27.95 kg/m²   Vitals reviewed.    Constitutional: No acute distress.   HENT: Atraumatic. PERRLA  Cardiovascular: Normal rate, regular rhythm and normal heart sounds.   Pulmonary/Chest: Effort normal. She has no wheezes.   Abdominal: Soft. no tenderness,no distension,bowel sounds are normal  Neurological: AOX3  Skin: Skin is warm and dry.     Following labs were Reviewed   Recent Labs   Lab 04/05/20  0749   WBC 9.67   HGB 10.5*   HCT 32.5*   *   CALCIUM 8.9   ALBUMIN 2.8*   PROT 7.9      K 3.7   CO2 18*   *   BUN 33*   CREATININE 1.4   ALKPHOS 83   ALT 50*   *   BILITOT 0.7     No results found for: POCTGLUCOSE     Microbiology Results (last 7 days)     Procedure Component Value " Units Date/Time    Blood culture (site 1) [[id number]] Collected:  04/04/20 1700    Order Status:  Completed Specimen:  Blood from Peripheral, Hand, Right Updated:  04/05/20 0117     Blood Culture, Routine No Growth to date    Narrative:       Site # 1, aerobic and anaerobic        X-Ray Chest AP Portable   Final Result      Borderline cardiomegaly with development of mild linear and ground-glass alveolar opacities in bilateral lung bases either reflecting infectious or inflammatory pneumonia, atelectasis, or some combination thereof.  Pulmonary edema felt less likely.         Electronically signed by: Arcadio Reyes MD   Date:    04/04/2020   Time:    15:50          Inpatient medications  Scheduled Meds:   albuterol  4 puff Inhalation Q8H    azithromycin  250 mg Oral Daily    cefTRIAXone (ROCEPHIN) IVPB  1 g Intravenous Q24H    chlorhexidine  15 mL Mouth/Throat BID    darunavir-cobicistat  1 tablet Oral Daily    enoxaparin  40 mg Subcutaneous Daily    [START ON 4/6/2020] hydroxychloroquine  400 mg Oral Daily    mupirocin   Nasal BID    pantoprazole  40 mg Intravenous Daily     Continuous Infusions:  PRN Meds:.acetaminophen, albuterol **AND** MDI Q4H PRN, calcium gluconate IVPB, calcium gluconate IVPB, calcium gluconate IVPB, magnesium sulfate IVPB, magnesium sulfate IVPB, ondansetron, potassium chloride in water **AND** potassium chloride in water **AND** potassium chloride in water, sodium chloride 0.9%, sodium phosphate IVPB, sodium phosphate IVPB, sodium phosphate IVPB      Assessment & Plan:   Asif Velez is a 79 y.o. male admitted for  Acute respiratory failure with hypoxia  1. Acute Hypoxemic Respiratory Failure - . Supplemental oxygen to maintain goal of 90% saturation.   Avoid aerosol generating procedures.   Pulmonary consult as patient is requiring higher oxygenation appreiated    Pneumonia of both lower lobes due to infectious organism  COVID 19 bilateral pneumonia - isolation and Airborne  precautions.   Ceftriaxone, Azithromycin and hydroxychlorquine.   COVID 19 testing positive   inflammatory markers including CRP,  and Ferritin.  Elevated  QTc on admit 450      COVID-19 virus detected  Treatment as above      Hypertension  Continue home meds      Anemia, chronic disease  No signs of acute blood loss  Follow H/H             Core measures:  - Code status: Full  - DVT PPx:Lovenox   - lines/ tubes:       Discharge Planning:   No mobility needs. Patient will be discharged in   PT C/S for debility/fraility/deconditioning and assistance in discharge needs.       Above encounter included review of the medical records, interviewing and examining the patient face-to-face, discussion with family and other health care providers, ordering and interpreting lab/test results and formulating a plan of care.
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
## 2020-04-05 NOTE — PLAN OF CARE
This note also relates to the following rows which could not be included:  SpO2 - Cannot attach notes to unvalidated device data  Pulse - Cannot attach notes to unvalidated device data  Resp - Cannot attach notes to unvalidated device data       04/04/20 2100   Patient Assessment/Suction   Level of Consciousness (AVPU) alert   Respiratory Effort Mild;Short of breath   Expansion/Accessory Muscles/Retractions accessory muscle use   PRE-TX-O2   O2 Device (Oxygen Therapy) venti mask   Flow (L/min) 15   Oxygen Concentration (%) 50   Pulse Oximetry Type Continuous   $ Pulse Oximetry - Multiple Charge Pulse Oximetry - Multiple   Ready to Wean/Extubation Screen   FIO2<=50 (chart decimal) 0.5   Respiratory Evaluation   $ Care Plan Tech Time 15 min   Evaluation For New Orders

## 2020-04-06 NOTE — PT/OT/SLP EVAL
Speech Language Pathology Evaluation  Bedside Swallow    Patient Name:  Asif Velez   MRN:  [id number]  Admitting Diagnosis: Acute respiratory failure with hypoxia    Recommendations:                 General Recommendations:  Ongoing assessment  Diet recommendations:   , Full liquids or thin liquid and puree solid   Aspiration Precautions: Assistance with meals, Eliminate distractions, HOB to 90 degrees, Meds crushed in puree and Small bites/sips   General Precautions: Standard, aspiration  Communication strategies:  none    History:     Past Medical History:   Diagnosis Date    Anemia     Anemia due to multiple mechanisms 8/2/2018    Anemia, chronic renal failure, stage 3 (moderate) 8/2/2018    Disorder of kidney and ureter     GERD (gastroesophageal reflux disease)     Gout     Hyperlipidemia     Hypertension        History reviewed. No pertinent surgical history.    Prior Intubation HX:  none    Modified Barium Swallow: none    Imaging Results          X-Ray Chest AP Portable (Final result)  Result time 04/04/20 15:50:22    Final result by Arcadio Reyes MD (04/04/20 15:50:22)                 Impression:      Borderline cardiomegaly with development of mild linear and ground-glass alveolar opacities in bilateral lung bases either reflecting infectious or inflammatory pneumonia, atelectasis, or some combination thereof.  Pulmonary edema felt less likely.      Electronically signed by: Arcadio Reyes MD  Date:    04/04/2020  Time:    15:50             Narrative:    EXAMINATION:  XR CHEST AP PORTABLE    CLINICAL HISTORY:  Suspected Covid-19 Virus Infection;  dyspnea    FINDINGS:  Portable chest at 1527 compared with 03/30/2020 shows unchanged borderline enlarged cardiac silhouette size with normal mediastinal contours.    Since the prior exam, scattered bibasilar airspace opacities with linear and ground-glass appearance have developed.  Central pulmonary vasculature remains normal.  No pleural effusion.  No  "acute osseous abnormality.                                  Prior diet: unknown.    Occupation/hobbies/homemaking: unknown.    Subjective     "I can't swallow"    Patient goals: none stated      Pain/Comfort:  · Pain Rating 1: 0/10    Objective:     Flat affect with significantly delayed responses. Intelligibility compromised by Pt's reduced intensity requiring frequent requests for repetition. Pt with poor engagement & ability to describe symptoms affecting evaluation.     Communicated w/ RN who promotes Pt w/ coughing during med administration (with liquid) as well as with solid textures this AM. Meds present at bedside administered.     Oral Musculature Evaluation  · Oral Musculature: WFL  · Dentition: upper dentures  · Mandibular Strength and Mobility: WFL  · Oral Labial Strength and Mobility: WFL  · Lingual Strength and Mobility: WFL  · Volitional Cough: Able to expel secretions to oral cavity  · Volitional Swallow: Hyolaryngeal movement present to digital palpation   · Voice Prior to PO Intake: Reduced intensity     Bedside Swallow Eval:   Consistencies Assessed:  · Thin liquids tsp x2, cup edge x5, straw x3  · Nectar thick liquids cup edge x3  · Puree tsp x5     Oral Phase:   · Delayed transit   · Stripping of medication (large tablet broken in 1/2) buried in puree x3; initiation successful when immediately followed with cup edge     Pharyngeal Phase:   · immediate cough response with 1/5 cup edge thin and 3/3 straw sips thin      Assessment:     Asif Velez is a 79 y.o. male with an SLP diagnosis of cognitive deficits which appear to be affecting efficiency of swallow. Recommend withholding solids at this time. Recommend either full liquid diet or thin liquid and puree solids. No straws.     Plan:     · Patient to be seen:  (2-5)   · Plan of Care expires:     · Plan of Care reviewed with:  patient   · SLP Follow-Up:  Yes       Discharge recommendations:  (pending)     Time Tracking:     SLP Treatment Date:  "  04/06/20  Speech Start Time:  1040  Speech Stop Time:  1113     Speech Total Time (min):  33 min    Billable Minutes: Treatment Swallowing Dysfunction 13 and Eval Swallow and Oral Function 20    Toño Otto CCC-SLP  04/06/2020

## 2020-04-06 NOTE — PROGRESS NOTES
"Pulmonary/Critical Care Consult      Patient name: Asif Velez  MRN: [id number]  Date: 04/06/2020    Admit Date: 4/4/2020  Consult Requested By: Marleny Rowell MD    Reason for Consult:  COVID pneumonia    HPI:    The patient is an 79-year-old gentleman who was diagnosed with COVID pneumonia on 03/30/20.  He was discharged home.  He returned to the emergency room last night with increasing dyspnea.  He was put in the ICU because he was on a Venti mask at that.  Today he is oxygenating adequately on 5 L nasal cannula.  He is in no distress.  He is fairly anorectic.    4/6 the patient is sleepy.  He is oxygenating better.  He is on 4 L nasal cannula.  He denies any distress or shortness of breath.  He is thirsty.  ROS  General: Feeling tired  Eyes: Vision is good.  ENT:  No sinusitis or pharyngitis.   Heart:: No chest pain or palpitations.  Lungs: No cough, just shortness of breath  GI: No Nausea, vomiting, constipation, diarrhea, or reflux.  Thirsty.  : No dysuria, hesitancy, or nocturia.  Skin: No lesions or rashes.  Musculoskeletal: No joint pain or myalgias.  Neuro: No headaches or neuropathy.  Lymph: No edema or adenopathy.  Psych: No anxiety or depression.  Endo: No weight change.      Physical Exam    Vital signs:  Temp:  [96.6 °F (35.9 °C)-99.8 °F (37.7 °C)]   Pulse:  [64-98]   Resp:  [20-28]   BP: (133-155)/(81-90)   SpO2:  [90 %-98 %]     Intake/Output:     Intake/Output Summary (Last 24 hours) at 4/6/2020 1544  Last data filed at 4/5/2020 2100  Gross per 24 hour   Intake 120 ml   Output --   Net 120 ml        BMI: Estimated body mass index is 27.58 kg/m² as calculated from the following:    Height as of this encounter: 5' 11" (1.803 m).    Weight as of this encounter: 89.7 kg (197 lb 12 oz).    Physical Exam  GENERAL: Older patient in no distress.  HEENT: Pupils equal and reactive. Extraocular movements intact. Nose intact.      Pharynx moist.  NECK: Supple.   HEART: Regular rate and rhythm. No murmur " or gallop auscultated.  LUNGS:  There are crackles posteriorly and laterally..  Lung excursion symmetrical. No change in fremitus.   ABDOMEN: Bowel sounds present. Non-tender, no masses palpated.  : Normal anatomy.  EXTREMITIES: Normal muscle tone and joint movement, no cyanosis or clubbing.  The 4th and 5th fingers on the right hand has swan-neck deformities.  There is some contraction of the right arm at the wrist hand and elbow.  LYMPHATICS: No adenopathy palpated, no edema.  SKIN: Dry, intact, no lesions.   NEURO: Cranial nerves II-XII intact. Motor strength 5/5 bilaterally, upper and lower extremities.  PSYCH: Appropriate affect.    Laboratory    Recent Labs   Lab 04/06/20  0625   WBC 8.11   RBC 4.85   HGB 11.8*   HCT 37.1*   *   MCV 77*   MCH 24.3*   MCHC 31.8*       Recent Labs   Lab 04/06/20  0625   CALCIUM 9.2   PROT 8.3   *   K 4.2   CO2 20*   *   BUN 34*   CREATININE 1.4   ALKPHOS 90   ALT 55*   AST 93*   BILITOT 0.8    Ferritin 208  CRP is 28.94-21.47  CPK is 626    Lactic acid 2.3  Procalcitonin 0.69        Recent Labs   Lab 04/04/20  1450   BNP 91             Microbiology:       Microbiology Results (last 7 days)     Procedure Component Value Units Date/Time    Blood culture (site 1) [[id number]] Collected:  04/04/20 1700    Order Status:  Completed Specimen:  Blood from Peripheral, Hand, Right Updated:  04/05/20 2032     Blood Culture, Routine No Growth to date      No Growth to date    Narrative:       Site # 1, aerobic and anaerobic          Radiology      Fairly faint right lower lobe pneumonia, there is also some left lower lobe pneumonia just not seen as well    5 L nasal cannula    Impression/Plan    COVID-19 pneumonia with progression to hypoxemia  Acute hypoxic respiratory failure  On Plaquenil and azithromycin  Apnea of unknown chronicity, microcytic indices  Thrombocytosis  Metabolic acidosis  Pre renal azotemia  Moderate hypoalbuminemia  Transaminitis    Continue  Plaquenil, azithromycin, and Prescovix  Wean oxygen as tolerated  Increase activity

## 2020-04-06 NOTE — PLAN OF CARE
04/06/20 0800   Patient Assessment/Suction   Level of Consciousness (AVPU) responds to voice   Respiratory Effort Mild   All Lung Fields Breath Sounds diminished   PRE-TX-O2   O2 Device (Oxygen Therapy) nasal cannula   $ Is the patient on Low Flow Oxygen? Yes   Flow (L/min) 6   SpO2 98 %   Pulse Oximetry Type Intermittent   $ Pulse Oximetry - Multiple Charge Pulse Oximetry - Multiple   Pulse 94   Resp (!) 24   Inhaler   $ Inhaler Charges MDI (Metered Dose [place]ah[place]) Treatment   Daily Review of Necessity (Inhaler) completed   Respiratory Treatment Status (Inhaler) given   Treatment Route (Inhaler) spacer/holding chamber   Patient Position (Inhaler) HOB elevated   Post Treatment Assessment (Inhaler) breath sounds unchanged   Signs of Intolerance (Inhaler) none

## 2020-04-07 PROBLEM — E87.0 HYPERNATREMIA: Status: ACTIVE | Noted: 2020-01-01

## 2020-04-07 PROBLEM — N40.0 BPH (BENIGN PROSTATIC HYPERPLASIA): Chronic | Status: ACTIVE | Noted: 2020-01-01

## 2020-04-07 PROBLEM — U07.1 LOWER RESPIRATORY TRACT INFECTION DUE TO COVID-19 VIRUS: Status: ACTIVE | Noted: 2020-01-01

## 2020-04-07 PROBLEM — K21.9 GERD (GASTROESOPHAGEAL REFLUX DISEASE): Chronic | Status: ACTIVE | Noted: 2020-01-01

## 2020-04-07 PROBLEM — J22 LOWER RESPIRATORY TRACT INFECTION DUE TO COVID-19 VIRUS: Status: ACTIVE | Noted: 2020-01-01

## 2020-04-07 PROBLEM — R53.1 WEAKNESS: Status: ACTIVE | Noted: 2020-01-01

## 2020-04-07 PROBLEM — E87.20 METABOLIC ACIDOSIS: Chronic | Status: ACTIVE | Noted: 2020-01-01

## 2020-04-07 PROBLEM — R74.01 TRANSAMINITIS: Status: ACTIVE | Noted: 2020-01-01

## 2020-04-07 NOTE — PLAN OF CARE
Problem: Occupational Therapy Goal  Goal: Occupational Therapy Goal  Description  Goals to be met by: d/c     Patient will increase functional independence with ADLs by performing:    LE Dressing with Moderate Assistance.  Grooming while seated at sink in w/c with Modified [place].  Toileting from toilet with Minimal Assistance for hygiene and clothing management.   Toilet transfer to toilet with Minimal Assistance.     Outcome: Ongoing, Progressing

## 2020-04-07 NOTE — PROGRESS NOTES
"Pulmonary/Critical Care Consult      Patient name: Asif Velez  MRN: [id number]  Date: 04/07/2020    Admit Date: 4/4/2020  Consult Requested By: Chasity Wilkinson MD    Reason for Consult:  COVID pneumonia    HPI:    The patient is an 79-year-old gentleman who was diagnosed with COVID pneumonia on 03/30/20.  He was discharged home.  He returned to the emergency room last night with increasing dyspnea.  He was put in the ICU because he was on a Venti mask at that.  Today he is oxygenating adequately on 5 L nasal cannula.  He is in no distress.  He is fairly anorectic.    4/6 the patient is sleepy.  He is oxygenating better.  He is on 4 L nasal cannula.  He denies any distress or shortness of breath.  He is thirsty.    4/7  the patient is complaining of back pain.  He will not eat because his back hurts too badly.  He is saturating 94% on 6 L.  ROS  General: Feeling terrible because his back is hurting  Eyes: Vision is good.  ENT:  No sinusitis or pharyngitis.   Heart:: No chest pain or palpitations.  Lungs: No cough, just shortness of breath  GI:  He will not eat because his back hurts  : No dysuria, hesitancy, or nocturia.  Skin: No lesions or rashes.  Musculoskeletal:  His back hurts a great deal.  Neuro: No headaches or neuropathy.  Lymph: No edema or adenopathy.  Psych: No anxiety or depression.  Endo: No weight change.      Physical Exam    Vital signs:  Temp:  [97.6 °F (36.4 °C)-99.1 °F (37.3 °C)]   Pulse:  [85-94]   Resp:  [18-28]   BP: (129-153)/(74-90)   SpO2:  [92 %-100 %]     Intake/Output:     Intake/Output Summary (Last 24 hours) at 4/7/2020 1643  Last data filed at 4/7/2020 1313  Gross per 24 hour   Intake --   Output 500 ml   Net -500 ml        BMI: Estimated body mass index is 27.52 kg/m² as calculated from the following:    Height as of this encounter: 5' 11" (1.803 m).    Weight as of this encounter: 89.5 kg (197 lb 5 oz).    Physical Exam  GENERAL: Older patient in no distress.  HEENT: Pupils " equal and reactive. Extraocular movements intact. Nose intact.  Pharynx moist.  NECK: Supple.   HEART: Regular rate and rhythm. No murmur or gallop auscultated.  LUNGS:  There are crackles posteriorly..  Lung excursion symmetrical. No change in fremitus.   ABDOMEN: Bowel sounds present. Non-tender, no masses palpated.  : Normal anatomy.  EXTREMITIES: Normal muscle tone and joint movement, no cyanosis or clubbing.  The 4th and 5th fingers on the right hand has swan-neck deformities.  There is some contraction of the right arm at the wrist hand and elbow.  LYMPHATICS: No adenopathy palpated, no edema.  SKIN: Dry, intact, no lesions.   NEURO: Cranial nerves II-XII intact. Motor strength not formally tested.  PSYCH: Appropriate affect.    Laboratory    Recent Labs   Lab 04/07/20  0644   WBC 9.04   RBC 4.64   HGB 11.5*   HCT 36.0*   *   MCV 78*   MCH 24.8*   MCHC 31.9*       Recent Labs   Lab 04/07/20  0644   CALCIUM 9.3   PROT 8.1   *   K 4.0   CO2 19*   *   BUN 38*   CREATININE 1.5*   ALKPHOS 94   ALT 47*   AST 62*   BILITOT 0.7    Ferritin 208-186  CRP  28.94-21.47  -346    Lactic acid 2.3  Procalcitonin 0.69        Recent Labs   Lab 04/04/20  1450   BNP 91             Microbiology:       Microbiology Results (last 7 days)     Procedure Component Value Units Date/Time    Blood culture (site 1) [[id number]] Collected:  04/04/20 1700    Order Status:  Completed Specimen:  Blood from Peripheral, Hand, Right Updated:  04/06/20 2032     Blood Culture, Routine No Growth to date      No Growth to date      No Growth to date    Narrative:       Site # 1, aerobic and anaerobic        QTC  475    Radiology      Fairly faint right lower lobe pneumonia, there is also some left lower lobe pneumonia just not seen as well    6 L nasal cannula    Impression/Plan    COVID-19 pneumonia with progression to hypoxemia  Acute hypoxic respiratory failure  On Plaquenil and azithromycin  Apnea of unknown  chronicity, microcytic indices  Thrombocytosis  Metabolic acidosis  Pre renal azotemia  Moderate hypoalbuminemia  Transaminitis    Continue Plaquenil, azithromycin, and Prescovix  Wean oxygen as tolerated  Increase activity   Tylenol for back pain but space to q8 with transaminitis

## 2020-04-07 NOTE — PROGRESS NOTES
"[facility] Progress Note    Patient Name: Asif Velez MRN: [id number]   Patient Class: IP- Inpatient  Length of Stay: 2   Admission Date: 4/4/2020  2:41 PM Attending Physician: Marleny Rowell MD   Primary Care Provider: Obi Russell MD Face-to-Face encounter date: 04/06/2020   Chief Complaint: Shortness of Breath (Positive COVID)        Subjective:    Hospital course:  Patient was admitted for hypoxic respiratory failure due to COVID pneumonia.    4/5: Reports loss of appetite  Saturating 90% on 5 L afebrile.  CRP 28 .9, with mild CPK and LD ,Transaminase elevated,  Mild elevation of procalcitonin  On hydroxychloroquine and azitro  Pulmonology consult appreciated, Prescovix added    Downgraded to telemetry    Interval History: .No concerns/issues overnight reported by the patient or the nursing staff.  Reports weakness , on 4l nc,afebrile for 3 days  Elevated ddimer,CRP mild progress  PT/OT ordered    Review of Systems All other Review of Systems were found to be negative expect for that mentioned already in HPI.     Objective:   Physical Exam  BP (!) 146/83   Pulse 90   Temp 98.2 °F (36.8 °C) (Oral)   Resp (!) 22   Ht 5' 11" (1.803 m)   Wt 89.7 kg (197 lb 12 oz)   SpO2 95%   BMI 27.58 kg/m²   Vitals reviewed.    Constitutional: No acute distress on nc   HENT: Atraumatic. PERRLA  Cardiovascular: Normal rate, regular rhythm and normal heart sounds.   Pulmonary/Chest: Effort normal.  has no wheezes.   Abdominal: Soft. no tenderness,no distension,bowel sounds are normal  Neurological: AOX3  Skin: Skin is warm and dry.     Following labs were Reviewed   Recent Labs   Lab 04/06/20  0625   WBC 8.11   HGB 11.8*   HCT 37.1*   *   CALCIUM 9.2   ALBUMIN 2.8*   PROT 8.3   *   K 4.2   CO2 20*   *   BUN 34*   CREATININE 1.4   ALKPHOS 90   ALT 55*   AST 93*   BILITOT 0.8     No results found for: POCTGLUCOSE     Microbiology Results (last 7 days)     " Procedure Component Value Units Date/Time    Blood culture (site 1) [[id number]] Collected:  04/04/20 1700    Order Status:  Completed Specimen:  Blood from Peripheral, Hand, Right Updated:  04/05/20 2032     Blood Culture, Routine No Growth to date      No Growth to date    Narrative:       Site # 1, aerobic and anaerobic        X-Ray Chest AP Portable   Final Result      Borderline cardiomegaly with development of mild linear and ground-glass alveolar opacities in bilateral lung bases either reflecting infectious or inflammatory pneumonia, atelectasis, or some combination thereof.  Pulmonary edema felt less likely.         Electronically signed by: Arcadio Reyes MD   Date:    04/04/2020   Time:    15:50          Inpatient medications  Scheduled Meds:   albuterol  4 puff Inhalation Q8H    azithromycin  250 mg Oral Daily    cefTRIAXone (ROCEPHIN) IVPB  1 g Intravenous Q24H    chlorhexidine  15 mL Mouth/Throat BID    darunavir-cobicistat  1 tablet Oral Daily    enoxaparin  40 mg Subcutaneous Daily    hydroxychloroquine  400 mg Oral Daily    mupirocin   Nasal BID    pantoprazole  40 mg Intravenous Daily     Continuous Infusions:  PRN Meds:.acetaminophen, albuterol **AND** MDI Q4H PRN, calcium gluconate IVPB, calcium gluconate IVPB, calcium gluconate IVPB, magnesium sulfate IVPB, magnesium sulfate IVPB, ondansetron, potassium chloride in water **AND** potassium chloride in water **AND** potassium chloride in water, sodium chloride 0.9%, sodium phosphate IVPB, sodium phosphate IVPB, sodium phosphate IVPB      Assessment & Plan:   Asif Velez is a 79 y.o. male admitted for  Acute respiratory failure with hypoxia  . Supplemental oxygen to maintain goal of 90% saturation.   Avoid aerosol generating procedures.   Pulmonary consult as patient is requiring higher oxygenation appreiated    Pneumonia of both lower lobes due to infectious organism  COVID 19 bilateral pneumonia - isolation and Airborne precautions.    Ceftriaxone, Azithromycin and hydroxychlorquine.   COVID 19 testing positive   inflammatory markers including CRP,  and Ferritin.  Elevated  QTc on admit 450      COVID-19 virus detected  Treatment as above      Hypertension  Continue home meds      Anemia, chronic disease  No signs of acute blood loss  Follow H/H             Core measures:  - Code status: Full  - DVT PPx:Lovenox   - lines/ tubes:       Discharge Planning:   No mobility needs. Patient will be discharged in   PT C/S for debility/fraility/deconditioning and assistance in discharge needs.       Above encounter included review of the medical records, interviewing and examining the patient face-to-face, discussion with family and other health care providers, ordering and interpreting lab/test results and formulating a plan of care.
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
## 2020-04-07 NOTE — ASSESSMENT & PLAN NOTE
Chronic medical condition.  Blood pressure has been up trending.  Restart home amlodipine and Coreg.  Holding lisinopril for now.

## 2020-04-07 NOTE — ASSESSMENT & PLAN NOTE
Known COVID-19 positive with chest x-ray bilateral ground-glass opacities consistent with bilateral pneumonia.  Has been requiring supplemental oxygen, diminished air entry.  CRP 28--> 21  Ferritin 208--> 186  Procalcitonin 0.69, lactic acid on admission 2.3.  QTC on admission  MS, QTC on telemetry 0 4/07 475 MS  Continue azithromycin, Plaquenil and Prezcobix  Avoid steroids, aerosolized procedure, IV fluids if possible  Monitoring QTC with continuous cardiac telemetry and p.r.n. EKG  Trending CRP and ferritin  Isolation precautions  Appreciate pulmonary input

## 2020-04-07 NOTE — PLAN OF CARE
04/07/20 0944   Discharge Assessment   Assessment Type Discharge Planning Assessment     SW attempted to complete pt's initial assessment via telephone. Pt informed SW that he did not want to answer questions at this time, as he is not feeling well. Pt requested that SW call back at a later time.

## 2020-04-07 NOTE — PROGRESS NOTES
Pharmacist Intervention IV to PO Note    Asif Velez is a 79 y.o. male being treated with IV medication pantoprazole    Patient Data:    Vital Signs (Most Recent):  Temp: 97.7 °F (36.5 °C) (04/07/20 0002)  Pulse: 89 (04/07/20 0002)  Resp: 18 (04/07/20 0002)  BP: 138/79 (04/07/20 0002)  SpO2: (!) 94 % (04/07/20 0002)   Vital Signs (72h Range):  Temp:  [96.6 °F (35.9 °C)-99.8 °F (37.7 °C)]   Pulse:  []   Resp:  [14-42]   BP: (120-155)/(63-90)   SpO2:  [84 %-98 %]      CBC:  Recent Labs   Lab 04/04/20  1450 04/05/20  0749 04/06/20  0625   WBC 9.61 9.67 8.11   RBC 4.36* 4.27* 4.85   HGB 10.7* 10.5* 11.8*   HCT 34.0* 32.5* 37.1*   * 436* 404*   MCV 78* 76* 77*   MCH 24.5* 24.6* 24.3*   MCHC 31.5* 32.3 31.8*     CMP:     Recent Labs   Lab 04/04/20  1450 04/05/20  0749 04/06/20  0625   * 108 101   CALCIUM 8.7 8.9 9.2   ALBUMIN 3.0* 2.8* 2.8*   PROT 8.2 7.9 8.3    145 146*   K 4.5 3.7 4.2   CO2 20* 18* 20*    115* 112*   BUN 36* 33* 34*   CREATININE 1.9* 1.4 1.4   ALKPHOS 78 83 90   ALT 36 50* 55*   * 101* 93*   BILITOT 0.7 0.7 0.8       Dietary Orders:  Diet Orders            Dietary nutrition supplements [facility]; Ensure Enlive starting at 04/06 1800    Diet full liquid: Full Liquid starting at 04/06 1153            Based on the following criteria, this patient qualifies for intravenous to oral conversion:  [x] The patients gastrointestinal tract is functioning (tolerating medications via oral or enteral route for 24 hours and tolerating food or enteral feeds for 24 hours).  [x] The patient is not scheduled for surgery within the next 24 hours.    IV medication Pantoprazole will be changed to oral medication Pantoprazole    Pharmacist's Name: Brook Huddleston  Pharmacist's Extension: [phone]

## 2020-04-07 NOTE — ASSESSMENT & PLAN NOTE
Secondary to COVID-19 and bilateral pneumonia.   Patient currently on 6 L supplemental oxygen, increased requirement over the last 24 hr.  Continue supplemental oxygen, wean as able  P.r.n. ABGs and chest x-rays

## 2020-04-07 NOTE — ASSESSMENT & PLAN NOTE
Patient with severe generalized weakness likely due to viral prodrome with decreased reserves at baseline.  Increase activity as able.  PT.  Sat out in chair.

## 2020-04-07 NOTE — PLAN OF CARE
04/07/20 0944   Discharge Assessment   Assessment Type Discharge Planning Assessment   Confirmed/corrected address and phone number on facesheet? Yes   Assessment information obtained from? Caregiver  (Information obtained via telephone with pt's daughter, Rosanne [phone])   Communicated expected length of stay with patient/caregiver yes   Prior to hospitilization cognitive status: Alert/Oriented   Prior to hospitalization functional status: Independent   Current cognitive status: Alert/Oriented   Current Functional Status: Needs Assistance   Facility Arrived From: Home   Lives With spouse  (Pt resides with his wife, Gavin Velez)   Able to Return to Prior Arrangements yes   Is patient able to care for self after discharge? Unable to determine at this time (comments)   Who are your caregiver(s) and their phone number(s)? Pt's primary caregiver his wife, Gavin, and his daughter, Rosanne [phone]. Pt has not addressed any POA or advanced directives.   Patient's perception of discharge disposition home or selfcare   Readmission Within the Last 30 Days no previous admission in last 30 days   Patient currently being followed by outpatient case management? Yes   If yes, name of outpatient case management following: insurance company assigned oupatient case management   Equipment Currently Used at Home rollator;cane, straight   Part D Coverage [employer]   Do you have any problems affording any of your prescribed medications? No   Is the patient taking medications as prescribed? yes   Does the patient have transportation home? Yes   Transportation Anticipated family or friend will provide   Does the patient receive services at the [facility]? No   Discharge Plan A Home with family;Home   Discharge Plan B Home with family;Home   DME Needed Upon Discharge  none   Patient/Family in Agreement with Plan yes

## 2020-04-07 NOTE — PT/OT/SLP EVAL
Physical Therapy Evaluation    Patient Name:  Asif Velez   MRN:  [id number]    Recommendations:     Discharge Recommendations:  nursing facility, skilled   Discharge Equipment Recommendations:     Barriers to discharge: significant decline in functional mobility    Assessment:     Asif Velez is a 79 y.o. male admitted with a medical diagnosis of Acute respiratory failure with hypoxia.  He presents with the following impairments/functional limitations:  weakness, impaired endurance, impaired functional mobilty, impaired self care skills, gait instability, impaired balance, impaired cardiopulmonary response to activity  Pt presented with  5 L 02 in place and breathing was shallow. 02 sat dropped from 93% to 85 % with t/f to EOB. Pt stood momentarily but had to return to EOB because of SOB.  Recommend SNF upon D/C as pt has declined from Modified Indepent to Max A with functional mobility.  Pt has R hand deformity and reportedy required assistance  with feeding. Pt reported using RW at  Home. It is questionable as to whether  patient was able to grasp RW  With R hand. Recommend SNF upon D/C.    Rehab Prognosis: Good; patient would benefit from acute skilled PT services to address these deficits and reach maximum level of function.    Recent Surgery: * No surgery found *      Plan:     During this hospitalization, patient to be seen 6 x/week to address the identified rehab impairments via gait training, therapeutic activities, therapeutic exercises and progress toward the following goals:    · Plan of Care Expires:       Subjective     Chief Complaint: SOB  Patient/Family Comments/goals: home  with wife  Pain/Comfort:  ·      Patients cultural, spiritual, Scientologist conflicts given the current situation:      Living Environment:  Prior to admission, patients level of function was modified independent   Equipment used at home: walker, rolling, cane, straight.  DME owned (not currently used): none.  Upon discharge,  patient will have assistance from family.    Objective:     Communicated with nurse prior to session.  Patient found supine with    upon PT entry to room.    General Precautions: Standard, airborne, droplet, fall, contact, aspiration   Orthopedic Precautions:    Braces:       Exams:  · Cognitive Exam:  Patient is oriented to Person, Place, Time and Situation  · RUE ROM: WFL except has flexion hand deformity  · RUE Strength: WFL  · LUE ROM: WNL  · LUE Strength: WNL  · RLE ROM: WFL  · RLE Strength: WFL  · LLE ROM: WFL  · LLE Strength: WFL    Functional Mobility:  · Bed Mobility:     · Supine to Sit: maximal assistance  · Transfers:     · Sit to Stand:  maximal assistance with hand-held assist  · Balance: poor sitting balance with stromng posterior lean      Therapeutic Activities and Exercises:  Bed mobility and t/f training with Max A .  Pt was unable to assume a complete upright posture     AM-PAC 6 CLICK MOBILITY  Total Score:10     Patient left supine with call button in reach and bed alarm on.    GOALS:   Multidisciplinary Problems     Physical Therapy Goals        Problem: Physical Therapy Goal    Goal Priority Disciplines Outcome Goal Variances Interventions   Physical Therapy Goal     PT, PT/OT      Description:  Goals to be met by: D/C     Patient will increase functional independence with mobility by performin. Supine to sit with MInimal Assistance  2. Sit to stand transfer with Minimal Assistance  3. Gait  x 50  feet with Contact Guard Assistance using Rolling Walker.                       History:     Past Medical History:   Diagnosis Date    Anemia     Anemia due to multiple mechanisms 2018    Anemia, chronic renal failure, stage 3 (moderate) 2018    Disorder of kidney and ureter     GERD (gastroesophageal reflux disease)     Gout     Hyperlipidemia     Hypertension        History reviewed. No pertinent surgical history.    Time Tracking:     PT Received On: 20  PT Start Time:  1135     PT Stop Time: 1205  PT Total Time (min): 30 min     Billable Minutes: Evaluation 6 minutes and Therapeutic Activity 24 minutes      Loren Franco, PT  04/07/2020
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
## 2020-04-07 NOTE — ASSESSMENT & PLAN NOTE
Did have CONSTANCE initially on presentation which is now resolved.  Renally dose all medications and avoid nephrotoxin drugs.  Trending BMP.
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
## 2020-04-07 NOTE — ASSESSMENT & PLAN NOTE
Followed as an outpatient by Dr. Chacon.  Restart home alfuzosin.  P.r.n. bladder scan as needed.

## 2020-04-07 NOTE — ASSESSMENT & PLAN NOTE
Sodium increased to 146, has had poor appetite with decreased oral intake.  Encourage oral diet as tolerated.  Assistance with meals.  Trending BMP.

## 2020-04-07 NOTE — HOSPITAL COURSE
I, Dr Wilkinson, assumed care of this patient on 04/07/2020.  Patient admitted with acute hypoxic respiratory failure secondary to COVID-19 pneumonia.  He was initially admitted to the ICU and subsequently transferred to telemetry floor.  He was started on azithromycin, Plaquenil as well as Prezcobix.  Pulmonary was consulted.  Generalized weakness with low appetite.  On 04/07 still requiring 6 L supplemental oxygen, complaining of some left upper abdominal discomfort, generalized weakness, seen by PT. On 4/09 Dr Pineda discussed with family, now DNR/DNI. On 4/11 decreased responsiveness/encephalopathic, mumbles incomprehensible sounds at time, shallow breathing, did discuss with daughter Rosanne Mendoza on 4/10 and they were not ready for comfort directed care but aware patient was declining and prognosis poor. On 4/12 continues with encephalopathy and generalized weakness, no awake or strong to tolerate oral intake, CT head without any acute stroke, ammonia, ABG, lactic acid, BHB all checked. Called and discussed with family, asking about remdesivir and discussed he would not meet criteria, they are ware he is declining, but do not want to give up, not ready for comfort care.  On 4/14 continued with declining mental status, distressed/moaning/groaning, not taking oral, not following commands, again discussed with daughter Rosanne Mendoza and explained clinical status, her mother was nearby (has dementia) and now agreeable to comfort directed care with morphine and ativan, no further lab checks.     04/15-04/18 - I assumed care of patient on 04/15.  I discussed plan of care with her daughter Rosanne.  We agreed upon transitioning to hospice. In the meanwhile, he was on comfort measures. Patient passed away on 04/18 even before being accepted into hospice medical facility.  See death note for further details.

## 2020-04-08 PROBLEM — R94.31 QT PROLONGATION: Status: ACTIVE | Noted: 2020-01-01

## 2020-04-08 PROBLEM — N17.9 AKI (ACUTE KIDNEY INJURY): Status: ACTIVE | Noted: 2017-06-01

## 2020-04-08 NOTE — ASSESSMENT & PLAN NOTE
Secondary to COVID-19 and bilateral pneumonia.   Patient currently on 6 L supplemental oxygen, begin to wean as tolerated.  P.r.n. ABGs and chest x-rays

## 2020-04-08 NOTE — PT/OT/SLP PROGRESS
Occupational Therapy   Treatment    Name: Asif Velez  MRN: [id number]  Admitting Diagnosis:  Acute respiratory failure with hypoxia       Recommendations:     Discharge Recommendations: nursing facility, skilled  Discharge Equipment Recommendations:  (TBD; pt may need shower chair or tub bench)  Barriers to discharge:       Assessment:     Asif Velez is a 79 y.o. male with a medical diagnosis of Acute respiratory failure with hypoxia.  He presents with flat affect with limited verbal interaction. Performance deficits affecting function are weakness, impaired endurance, impaired functional mobilty, impaired cognition, decreased safety awareness, impaired cardiopulmonary response to activity, decreased coordination, pain, gait instability, impaired fine motor, impaired joint extensibility, impaired balance, decreased upper extremity function, impaired self care skills, decreased ROM, decreased lower extremity function.     Rehab Prognosis:  Fair; patient would benefit from acute skilled OT services to address these deficits and reach maximum level of function.       Plan:     Patient to be seen 5 x/week to address the above listed problems via self-care/home management, therapeutic activities, therapeutic exercises  · Plan of Care Expires: 05/07/20  · Plan of Care Reviewed with: patient    Subjective     Pain/Comfort:  · Pain Rating Post-Intervention 1: 0/10    Objective:     Communicated with: Nursing prior to session.  Patient found HOB elevated however with the pt lying on his left side somewhat low in the bed with oxygen, alvarez catheter, peripheral IV, pulse ox (continuous), telemetry upon OT entry to room.    General Precautions: Standard, airborne, droplet, fall, contact, dental soft   Orthopedic Precautions:N/A   Braces: N/A     Occupational Performance:     Bed Mobility:    · Patient completed Rolling/Turning to Right with moderate assistance  · Patient completed Supine to Sit with with OT guiding and  "assisting his legs off the bed, the pt states, "No" and begins to pull his legs back into the bed; "My back hurts, N0"      Functional Mobility/Transfers:    · ADL's: pt refusing due to low back pain      AMPAC 6 Click ADL: 7    Treatment & Education: Body mechanics with low back pain for log rolling and sup to sit from sidelying; role of OT; daughter on the phone with the pt stating the pt has a long standing history of disc problems and back pain;   Pt on 6 LNC saturation 96% in Left sidelying in the bed after rolling onto his left side from his right side,however, pt refusing to sit up due to back pain despite OT assisting pt's legs; pt pulls his legs back in the bed and states,   Extended time needed for education with the above tasks    Patient left right sidelying with all lines intact, call button in reach, bed alarm on and nurse  notifiedEducation:      GOALS:   Multidisciplinary Problems     Occupational Therapy Goals        Problem: Occupational Therapy Goal    Goal Priority Disciplines Outcome Interventions   Occupational Therapy Goal     OT, PT/OT Ongoing, Progressing    Description:  Goals to be met by: d/c     Patient will increase functional independence with ADLs by performing:    LE Dressing with Moderate Assistance.  Grooming while seated at sink in w/c with Modified [place].  Toileting from toilet with Minimal Assistance for hygiene and clothing management.   Toilet transfer to toilet with Minimal Assistance.                      Time Tracking:     OT Date of Treatment: 04/08/20  OT Start Time: 1149  OT Stop Time: 1215  OT Total Time (min): 26 min    Billable Minutes:Therapeutic Activity 18  Therapeutic Exercise 8    Candi See OT  4/8/20206:54 PM      "

## 2020-04-08 NOTE — PLAN OF CARE
04/08/20 0804   Patient Assessment/Suction   Level of Consciousness (AVPU) alert   Respiratory Effort Normal;Unlabored   All Lung Fields Breath Sounds diminished   PRE-TX-O2   O2 Device (Oxygen Therapy) nasal cannula w/ humidification   $ Is the patient on Low Flow Oxygen? Yes   Flow (L/min) 3   SpO2 (!) 94 %   Pulse Oximetry Type Intermittent   $ Pulse Oximetry - Multiple Charge Pulse Oximetry - Multiple   Pulse 93   Resp 20   Inhaler   $ Inhaler Charges MDI (Metered Dose Inahler) Treatment;Given With Spacer   Daily Review of Necessity (Inhaler) completed   Respiratory Treatment Status (Inhaler) given   Treatment Route (Inhaler) mouthpiece;spacer/holding chamber   Patient Position (Inhaler) HOB elevated   Post Treatment Assessment (Inhaler) breath sounds improved   Signs of Intolerance (Inhaler) none   Respiratory Evaluation   $ Care Plan Tech Time 15 min

## 2020-04-08 NOTE — ASSESSMENT & PLAN NOTE
Followed as an outpatient by Dr. Chacon.  Currently has Iraheta in place.  Rechecking UA given acute kidney injury.

## 2020-04-08 NOTE — PLAN OF CARE
Problem: Physical Therapy Goal  Goal: Physical Therapy Goal  Description  Goals to be met by: D/C     Patient will increase functional independence with mobility by performin. Supine to sit with MInimal Assistance  2. Sit to stand transfer with Minimal Assistance  3. Gait  x 50  feet with Contact Guard Assistance using Rolling Walker.      Outcome: Ongoing, Progressing   Limited today due to O2 sats.

## 2020-04-08 NOTE — ASSESSMENT & PLAN NOTE
Patient with severe generalized weakness likely due to viral prodrome with decreased reserves at baseline.  Increase activity as able.  PT.  Sit out in chair.

## 2020-04-08 NOTE — PLAN OF CARE
Problem: Oral Intake Inadequate  Goal: Improved Oral Intake  Outcome: Ongoing, Progressing  Intervention: Promote and Optimize Oral Intake  Flowsheets (Taken 4/8/2020 1032)  Oral Nutrition Promotion: calorie dense liquids provided   Continue current diet and supplements as tolerated.  Encourage intake of meals and supplements, promote adequate PO intake and honor food preferences that dont interefere with swallowing capabilities. (Poor PO intake x 4 days-will monitor).  Please obtain new weight. With current weight, patient shows a 11.52% weight loss in 8 days (26 lbs). (Classified as Severe weight loss/malnutrition)

## 2020-04-08 NOTE — PROGRESS NOTES
"Pulmonary/Critical Care Consult      Patient name: Asif Velez  MRN: [id number]  Date: 04/08/2020    Admit Date: 4/4/2020  Consult Requested By: Chasity Wilkinson MD    Reason for Consult:  COVID pneumonia    HPI:    The patient is an 79-year-old gentleman who was diagnosed with COVID pneumonia on 03/30/20.  He was discharged home.  He returned to the emergency room last night with increasing dyspnea.  He was put in the ICU because he was on a Venti mask at that.  Today he is oxygenating adequately on 5 L nasal cannula.  He is in no distress.  He is fairly anorectic.    4/6 the patient is sleepy.  He is oxygenating better.  He is on 4 L nasal cannula.  He denies any distress or shortness of breath.  He is thirsty.    4/7  the patient is complaining of back pain.  He will not eat because his back hurts too badly.  He is saturating 94% on 6 L.    4/8  the patient looks more encephalopathic.  He does not appear to be eating.  He is still requiring 6 L of oxygen.  ROS  General:  Patient is without complaints.  Eyes: Vision is good.  ENT:  No sinusitis or pharyngitis.   Heart:: No chest pain or palpitations.  Lungs: No cough, just shortness of breath  GI:  He states he is not hungry.  : No dysuria, hesitancy, or nocturia.  Skin: No lesions or rashes.  Musculoskeletal:  His back is better today.  Neuro: No headaches or neuropathy.  Lymph: No edema or adenopathy.  Psych: No anxiety or depression.  Endo: No weight change.      Physical Exam    Vital signs:  Temp:  [97.6 °F (36.4 °C)-99.1 °F (37.3 °C)]   Pulse:  []   Resp:  [18-28]   BP: (110-155)/(55-96)   SpO2:  [90 %-97 %]     Intake/Output:     Intake/Output Summary (Last 24 hours) at 4/8/2020 1526  Last data filed at 4/8/2020 0900  Gross per 24 hour   Intake 240 ml   Output 700 ml   Net -460 ml        BMI: Estimated body mass index is 27.15 kg/m² as calculated from the following:    Height as of this encounter: 5' 11" (1.803 m).    Weight as of this encounter: " 88.3 kg (194 lb 10.7 oz).    Physical Exam  GENERAL: Older patient in no distress.  HEENT: Pupils equal and reactive. Extraocular movements intact. Nose intact.  Pharynx moist.  NECK: Supple.   HEART: Regular rate and rhythm. No murmur or gallop auscultated.  LUNGS:  The lungs are clear anteriorly. Lung excursion symmetrical. No change in fremitus.   ABDOMEN: Bowel sounds present. Non-tender, no masses palpated.  : Normal anatomy.  EXTREMITIES: Normal muscle tone and joint movement, no cyanosis or clubbing.  The 4th and 5th fingers on the right hand has swan-neck deformities.  There is some contraction of the right arm at the wrist hand and elbow.  LYMPHATICS: No adenopathy palpated, no edema.  SKIN: Dry, intact, no lesions.   NEURO: Cranial nerves II-XII intact. Motor strength not formally tested.  PSYCH: Appropriate affect.    Laboratory    Recent Labs   Lab 04/08/20  0617   WBC 9.49   RBC 4.77   HGB 11.6*   HCT 37.2*   *   MCV 78*   MCH 24.3*   MCHC 31.2*       Recent Labs   Lab 04/08/20  0617   CALCIUM 9.5   PROT 8.2   *   K 4.2   CO2 18*   *   BUN 47*   CREATININE 1.8*   ALKPHOS 106   ALT 43   AST 56*   BILITOT 0.7    Ferritin 208-186  CRP  28.94-21.47 -17.31  -346          Recent Labs   Lab 04/04/20  1450   BNP 91             Microbiology:       Microbiology Results (last 7 days)     Procedure Component Value Units Date/Time    Urine Culture High Risk [[id number]]     Order Status:  No result Specimen:  Urine, Catheterized     Blood culture (site 1) [[id number]] Collected:  04/04/20 1700    Order Status:  Completed Specimen:  Blood from Peripheral, Hand, Right Updated:  04/07/20 2032     Blood Culture, Routine No Growth to date      No Growth to date      No Growth to date      No Growth to date    Narrative:       Site # 1, aerobic and anaerobic        QTC  475    Radiology      Fairly faint right lower lobe pneumonia, there is also some left lower lobe pneumonia just not seen as  well    6 L nasal cannula    Impression/Plan    COVID-19 pneumonia with progression to hypoxemia  Acute hypoxic respiratory failure  On Plaquenil and azithromycin  Apnea of unknown chronicity, microcytic indices  Thrombocytosis  Metabolic acidosis  Pre renal azotemia  Moderate hypoalbuminemia  Transaminitis  Hyponatremia, worsening    Normal saline bolus and then 100 cc an hour  Repeat BMP it tomorrow  Continue Prescovix  Wean oxygen as tolerated  Increase activity   I see no reason why this patient could not be managed on 11 or 1200

## 2020-04-08 NOTE — ASSESSMENT & PLAN NOTE
Baseline QTC on admission 450.  On 04/08 increased the 508 on telemetry.  Twelve lead EKG confirming .  Holding QTC prolonging medication at this time.  Monitoring on telemetry.

## 2020-04-08 NOTE — PT/OT/SLP PROGRESS
"Speech Language Pathology Treatment    Patient Name:  Asif Velez   MRN:  [id number]  Admitting Diagnosis: Acute respiratory failure with hypoxia    Recommendations:                 General Recommendations:  Follow peripherally; direct evaluation and treatment as indicated  Diet recommendations: Liquid Diet Level: Full liquids   Aspiration Precautions: Assistance with meals   General Precautions: Standard, aspiration  Communication strategies:  none    Subjective     "That's good"  Patient goals: none stated      Pain/Comfort:  ·  none indicated     Objective:     Has the patient been evaluated by SLP for swallowing?   Yes  Keep patient NPO? No   Current Respiratory Status: nasal cannula(5L)      Pt semi reclined upon entry. Lunch untouched at bedside (IDDSI 6 solid ordered by medical team). Pt refuses any solids. Encouragement required for Pt to consume liquid. 6oz thin liquid consumed rapidly via straw. Slight cough noted following. Regulated sips provided (3oz). No overt s/s of airway compromise. RR and O2 maintained. Pt withdrawn and with no initiation. Discussed poor intake with RN.     Assessment:     Asif Velez is a 79 y.o. male with an SLP diagnosis of increased risk for airway compromise associated with impaired respiratory status.  He presents with poor intake.    Plan:     · Patient to be seen:  (2-5)   · Plan of Care expires:     · Plan of Care reviewed with:  patient   · SLP Follow-Up:  Yes       Discharge recommendations:  nursing facility, skilled       Time Tracking:     SLP Treatment Date:   04/08/20  Speech Start Time:  1300  Speech Stop Time:  1315    Speech Total Time (min):  15 min    Billable Minutes: Treatment Swallowing Dysfunction 15    Toño Otto CCC-SLP  04/08/2020  "

## 2020-04-08 NOTE — ASSESSMENT & PLAN NOTE
Does have baseline chronic kidney disease stage 3 however serum creatinine increased to 1.8.  Does have BPH with recurrent UTI however Iraheta catheter now in place and urine output last 24 hr 1200 cc.  Encouraging increased oral intake  Continue to hold lisinopril  Keep Iraheta in place  Check UA with urine culture given prior history  Renally dose all medications and avoid nephrotoxin drugs  Trending BMP

## 2020-04-08 NOTE — ASSESSMENT & PLAN NOTE
Slightly higher today associated with acute kidney injury.  He needs assistance with meals and does have poor appetite.  Discussed with nursing aid and nurse who would provide assistance for patient with meal as well as provide drinks/supplements between meals to ensure improved oral intake.  Trending BMP.
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
## 2020-04-08 NOTE — PROGRESS NOTES
"[facility]  Adult Nutrition   Progress Note (Initial Assessment)     SUMMARY     Recommendations/Interventions:  1. Continue current diet and supplements as tolerated.  2. Encourage intake of meals and supplements, promote adequate PO intake and honor food preferences that dont interefere with swallowing capabilities. (Poor PO intake x 4 days-will monitor).  3. Please obtain new weight. With current weight, patient shows a 11.52% weight loss in 8 days (26 lbs). (Classified as Severe weight loss/malnutrition)  Goals:   1. Patient to increase PO intake to at least 50% of meals and supplements.  2. New weight obtained.  Nutrition Goal Status: new    Dietitian Rounds Brief:  · Seen 2' LOS. Patient admitted for COVID-19. RN made RD aware of patients poor PO intake-Added Ensure Milkshake /. Patient continues to refuse all food. Need meal encouragement. Per EMR, patient has lost 26 lbs in 8 days? Need new weight. If poor PO intake continues x 7 days, patient may benefit from appetite stimulant or PPN. Will continue to monitor.  Reason for Assessment  Reason For Assessment: length of stay  Diagnosis: (COVID-19)  Relevant Medical History: anemia, CKD 3, GERD, HLD, HTN  Interdisciplinary Rounds: attended    Nutrition Risk Screen  Nutrition Risk Screen: dysphagia or difficulty swallowing    Nutrition/Diet History  Patient Reported Diet/Restrictions/Preferences: general  Food Allergies: NKFA  Factors Affecting Nutritional Intake: decreased appetite, pain    Anthropometrics  Temp: 98 °F (36.7 °C)  Height Method: Stated  Height: 5' 11" (180.3 cm)  Height (inches): 71 in  Weight Method: Bed Scale  Weight: 88.3 kg (194 lb 10.7 oz)  Weight (lb): 194.67 lb  Ideal Body Weight (IBW), Male: 172 lb  % Ideal Body Weight, Male (lb): 115.74 %  BMI (Calculated): 27.2  BMI Grade: 25 - 29.9 - overweight  Weight Loss: unintentional  Usual Body Weight (UBW), k.8 kg  Weight Change Amount: 26 lb  % Usual Body Weight: 88.66  % " Weight Change From Usual Weight: -11.52 %     Weight History:  Wt Readings from Last 10 Encounters:   04/08/20 88.3 kg (194 lb 10.7 oz)   03/30/20 99.8 kg (220 lb)   02/04/20 98.5 kg (217 lb 3.2 oz)   08/06/19 99.8 kg (220 lb)   06/18/19 100 kg (220 lb 7.4 oz)   05/20/19 98.9 kg (218 lb)   05/07/19 97 kg (213 lb 13.5 oz)   04/11/19 97 kg (213 lb 14.4 oz)   03/26/19 98.3 kg (216 lb 11.4 oz)   02/11/19 94.4 kg (208 lb 1.8 oz)     Lab/Procedures/Meds: Pertinent Labs Reviewed  Clinical Chemistry:  Recent Labs   Lab 04/05/20  0749 04/06/20  0625 04/07/20  0644 04/08/20  0617    146* 146* 147*   K 3.7 4.2 4.0 4.2   * 112* 114* 115*   CO2 18* 20* 19* 18*    101 107 113*   BUN 33* 34* 38* 47*   CREATININE 1.4 1.4 1.5* 1.8*   CALCIUM 8.9 9.2 9.3 9.5   PROT 7.9 8.3 8.1 8.2   ALBUMIN 2.8* 2.8* 2.8* 2.8*   BILITOT 0.7 0.8 0.7 0.7   ALKPHOS 83 90 94 106   * 93* 62* 56*   ALT 50* 55* 47* 43   ANIONGAP 12 14 13 14   ESTGFRAFRICA 54.5* 54.5* 50.1* 40.2*   EGFRNONAA 47.1* 47.1* 43.3* 34.8*   MG 1.6 1.7 1.8  --    PHOS 3.7 4.3 4.0  --      CBC:   Recent Labs   Lab 04/08/20  0617   WBC 9.49   RBC 4.77   HGB 11.6*   HCT 37.2*   *   MCV 78*   MCH 24.3*   MCHC 31.2*     Cardiac Profile:  Recent Labs   Lab 04/04/20  1450 04/07/20  0644   BNP 91  --    * 346*   TROPONINI 0.038  --      Inflammatory Labs:  Recent Labs   Lab 04/04/20  1450 04/06/20  0625 04/08/20  0617   CRP 28.94* 21.47* 17.31*     Medications: Pertinent Medications reviewed  Scheduled Meds:   albuterol  4 puff Inhalation Q8H    alfuzosin  10 mg Oral Daily with breakfast    amLODIPine  10 mg Oral Daily    aspirin  324 mg Oral Daily    carvediloL  25 mg Oral BID WM    chlorhexidine  15 mL Mouth/Throat BID    darunavir-cobicistat  1 tablet Oral Daily    enoxaparin  40 mg Subcutaneous Daily    mupirocin   Nasal BID    pantoprazole  40 mg Oral Daily     Continuous Infusions:  PRN Meds:.acetaminophen, albuterol **AND** MDI Q4H  PRN, calcium gluconate IVPB, calcium gluconate IVPB, calcium gluconate IVPB, HYDROcodone-acetaminophen, magnesium sulfate IVPB, magnesium sulfate IVPB, ondansetron, polyethylene glycol, potassium chloride in water **AND** potassium chloride in water **AND** potassium chloride in water, sodium chloride 0.9%, sodium phosphate IVPB, sodium phosphate IVPB, sodium phosphate IVPB    Estimated/Assessed Needs    Weight Used For Calorie Calculations: 88.3 kg (194 lb 10.7 oz)  Energy Calorie Requirements (kcal): 0546-6515 kcals/day (20-25 kcals/kg)  Energy Need Method: Kcal/kg  Protein Requirements: 53-71 g/day (0.6-0.8 g/kg)  Weight Used For Protein Calculations: 88.3 kg (194 lb 10.7 oz)     Estimated Fluid Requirement Method: RDA Method    Nutrition Prescription Ordered    Current Diet Order: Dysphagia Diet     Evaluation of Received Nutrient/Fluid Intake    Energy Calories Required: not meeting needs  Protein Required: not meeting needs  Fluid Required: meeting needs  Tolerance: tolerating  % Intake of Estimated Energy Needs: 0 - 25 %  % Meal Intake: 0 - 25 %    Intake/Output Summary (Last 24 hours) at 4/8/2020 1027  Last data filed at 4/8/2020 0900  Gross per 24 hour   Intake 240 ml   Output 1200 ml   Net -960 ml      Nutrition Risk    Level of Risk/Frequency of Follow-up: high   Monitor and Evaluation    Food and Nutrient Intake: energy intake, food and beverage intake  Food and Nutrient Adminstration: diet order  Anthropometric Measurements: weight change, weight  Biochemical Data, Medical Tests and Procedures: electrolyte and renal panel, gastrointestinal profile, glucose/endocrine profile, lipid profile, inflammatory profile  Nutrition-Focused Physical Findings: overall appearance     Nutrition Follow-Up    RD Follow-up?: Yes  Yusra Mccray RD 04/08/2020 10:32 AM

## 2020-04-08 NOTE — PT/OT/SLP PROGRESS
Physical Therapy Treatment    Patient Name:  Asif Velez   MRN:  [id number]    Recommendations:     Discharge Recommendations:  nursing facility, skilled   Discharge Equipment Recommendations:     Barriers to discharge: None    Assessment:     Asif Velez is a 79 y.o. male admitted with a medical diagnosis of Acute respiratory failure with hypoxia.  He presents with the following impairments/functional limitations:  weakness, impaired endurance, impaired self care skills, impaired functional mobilty, impaired balance, decreased safety awareness, pain, impaired cardiopulmonary response to activity, gait instability, impaired fine motor. Patient found supine in bed and agreeable to PT treatment. Pt resting on 6L O2 and sats found to be fluctuating between 88-89%. O2 increased to 9L with sats still resting at 90%. Further mobilization held due to O2 sats being too low however PTA assisted patient with repositioning in bed. Pt left on 6L O2 and sats 89%. Nurse notified.     Rehab Prognosis: Fair; patient would benefit from acute skilled PT services to address these deficits and reach maximum level of function.    Recent Surgery: * No surgery found *      Plan:     During this hospitalization, patient to be seen 6 x/week to address the identified rehab impairments via gait training, therapeutic activities, therapeutic exercises and progress toward the following goals:    · Plan of Care Expires:       Subjective     Chief Complaint: No complaints  Patient/Family Comments/goals:   Pain/Comfort:  · Pain Rating 1: 0/10      Objective:     Communicated with nurse prior to session.  Patient found HOB elevated with oxygen, telemetry upon PT entry to room.     General Precautions: Standard, airborne, droplet, contact, fall   Orthopedic Precautions:    Braces:       Functional Mobility:  · Bed Mobility:     · Scooting: total assistance and of 2 persons      AM-PAC 6 CLICK MOBILITY          Therapeutic Activities and Exercises:    bed mobility; pursed lip breathing education    Patient left HOB elevated with all lines intact, call button in reach, bed alarm on and nurse notified..    GOALS:   Multidisciplinary Problems     Physical Therapy Goals        Problem: Physical Therapy Goal    Goal Priority Disciplines Outcome Goal Variances Interventions   Physical Therapy Goal     PT, PT/OT Ongoing, Progressing     Description:  Goals to be met by: D/C     Patient will increase functional independence with mobility by performin. Supine to sit with MInimal Assistance  2. Sit to stand transfer with Minimal Assistance  3. Gait  x 50  feet with Contact Guard Assistance using Rolling Walker.                       Time Tracking:     PT Received On: 20  PT Start Time: 936     PT Stop Time: 959  PT Total Time (min): 23 min     Billable Minutes: Therapeutic Activity 23    Treatment Type: Treatment  PT/PTA: PTA     PTA Visit Number: Ryann Newman PTA  2020
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
## 2020-04-08 NOTE — PLAN OF CARE
Problem: Occupational Therapy Goal  Goal: Occupational Therapy Goal  Description  Goals to be met by: d/c     Patient will increase functional independence with ADLs by performing:    LE Dressing with Moderate Assistance.  Grooming while seated at sink in w/c with Modified Avery.  Toileting from toilet with Minimal Assistance for hygiene and clothing management.   Toilet transfer to toilet with Minimal Assistance.     Outcome: Ongoing, Not Progressing

## 2020-04-08 NOTE — ASSESSMENT & PLAN NOTE
Known COVID-19 positive with chest x-ray bilateral ground-glass opacities consistent with bilateral pneumonia.  Has been requiring supplemental oxygen, diminished air entry.  CRP 28--> 21-->17.31  Ferritin 208--> 186  Procalcitonin 0.69, lactic acid on admission 2.3.  QTC on admission  MS, QTC on telemetry 0 4/07 475 MS, increased to 508 MS (EKG confirmed 490MS) on 4/8  Holding azithromycin and Plaquenil on 04/08 due to QTC prolongation and monitoring  Continue Prezcobix  Avoid steroids, aerosolized procedure, IV fluids if possible  Monitoring QTC with continuous cardiac telemetry and p.r.n. EKG  Trending CRP and ferritin  Isolation precautions  Appreciate pulmonary input

## 2020-04-08 NOTE — PLAN OF CARE
Problem: SLP Goal  Goal: SLP Goal  Description  1. Ongoing assessment of swallow function with direct intervention as needed    Outcome: Ongoing, Not Progressing

## 2020-04-09 PROBLEM — R94.31 QT PROLONGATION: Status: RESOLVED | Noted: 2020-01-01 | Resolved: 2020-01-01

## 2020-04-09 NOTE — PROGRESS NOTES
Pulmonary/Critical Care Consult      Patient name: Asif Velez  MRN: [id number]  Date: 04/09/2020    Admit Date: 4/4/2020  Consult Requested By: Chasity Wilkinson MD    Reason for Consult:  COVID pneumonia    HPI:    The patient is an 79-year-old gentleman who was diagnosed with COVID pneumonia on 03/30/20.  He was discharged home.  He returned to the emergency room last night with increasing dyspnea.  He was put in the ICU because he was on a Venti mask at that.  Today he is oxygenating adequately on 5 L nasal cannula.  He is in no distress.  He is fairly anorectic.    4/6 the patient is sleepy.  He is oxygenating better.  He is on 4 L nasal cannula.  He denies any distress or shortness of breath.  He is thirsty.    4/7  the patient is complaining of back pain.  He will not eat because his back hurts too badly.  He is saturating 94% on 6 L.    4/8  the patient looks more encephalopathic.  He does not appear to be eating.  He is still requiring 6 L of oxygen.    4/9 the patient remains hypernatremic despite normal saline.  His creatinine is higher.  Will change to half-normal saline and bolus a half a L the patient appears to be having failure to thrive.  The patient looks extremely poor.  He is not eating or drinking.  He is weak.  His speech is difficult to understand.  ROS  General:  Patient is cold.  Eyes: Vision is good.  ENT:  No sinusitis or pharyngitis.   Heart:: No chest pain or palpitations.  Lungs: No cough, just shortness of breath  GI:  He states he is not hungry.  : No dysuria, hesitancy, or nocturia.  Skin: No lesions or rashes.  Musculoskeletal:  His back is better today.  Neuro: No headaches or neuropathy.  Lymph: No edema or adenopathy.  Psych: No anxiety or depression.  Endo: No weight change.      Physical Exam    Vital signs:  Temp:  [97.5 °F (36.4 °C)-97.9 °F (36.6 °C)]   Pulse:  [66-95]   Resp:  [18-27]   BP: (118-145)/(59-83)   SpO2:  [89 %-99 %]     Intake/Output:     Intake/Output Summary  "(Last 24 hours) at 4/9/2020 1743  Last data filed at 4/9/2020 1729  Gross per 24 hour   Intake 240 ml   Output --   Net 240 ml        BMI: Estimated body mass index is 27.15 kg/m² as calculated from the following:    Height as of this encounter: 5' 11" (1.803 m).    Weight as of this encounter: 88.3 kg (194 lb 10.7 oz).    Physical Exam  GENERAL: Older patient in no distress.  HEENT: Pupils equal and reactive. Extraocular movements intact. Nose intact.  Pharynx moist.  NECK: Supple.   HEART: Regular rate and rhythm. No murmur or gallop auscultated.  LUNGS:  The lungs are clear anteriorly. Lung excursion symmetrical. No change in fremitus.   ABDOMEN: Bowel sounds present. Non-tender, no masses palpated.  : Normal anatomy.  Iraheta catheter.  EXTREMITIES: Normal muscle tone and joint movement, no cyanosis or clubbing.  The 4th and 5th fingers on the right hand has swan-neck deformities.  There is some contraction of the right arm at the wrist hand and elbow.  LYMPHATICS: No adenopathy palpated, no edema.  SKIN: Dry, intact, no lesions.   NEURO: Cranial nerves II-XII intact. Motor strength not formally tested.  PSYCH:  Very flat affect..    Laboratory    No results for input(s): WBC, RBC, HGB, HCT, PLT, MCV, MCH, MCHC in the last 24 hours.    Recent Labs   Lab 04/09/20  0457   CALCIUM 9.2   *   K 3.9   CO2 20*   *   BUN 57*   CREATININE 2.0*    Ferritin 208-186  CRP  28.94-21.47 -17.31  -346          Recent Labs   Lab 04/04/20  1450   BNP 91             Microbiology:       Microbiology Results (last 7 days)     Procedure Component Value Units Date/Time    Blood culture (site 1) [[id number]] Collected:  04/04/20 1700    Order Status:  Completed Specimen:  Blood from Peripheral, Hand, Right Updated:  04/08/20 2032     Blood Culture, Routine No Growth to date      No Growth to date      No Growth to date      No Growth to date      No Growth to date    Narrative:       Site # 1, aerobic and anaerobic    " Urine Culture High Risk [[id number]]     Order Status:  No result Specimen:  Urine, Catheterized         QTC  475    Radiology      Fairly faint right lower lobe pneumonia, there is also some left lower lobe pneumonia just not seen as well    6 L nasal cannula, sats are 87%, oxygen increased to 10 L    Impression/Plan    COVID-19 pneumonia with progression to hypoxemia  Acute hypoxic respiratory failure  On Plaquenil and azithromycin  Apnea of unknown chronicity, microcytic indices  Thrombocytosis  Metabolic acidosis  Moderate hypoalbuminemia  Transaminitis  Hypernatremia, worsening  Worsening renal function  Failure to thrive    Change normal saline to half normal saline and continue at 100 cc an hour  Bolus 500 cc  Chest x-ray in a.m.  Discussed with the patient's daughter.  She understands that he is not doing well and is deteriorating.  She is agreeable to a DNR, DNI.  She is not willing to move to comfort care at this time.

## 2020-04-09 NOTE — PT/OT/SLP PROGRESS
Physical Therapy Treatment    Patient Name:  Asif Velez   MRN:  [id number]    Recommendations:     Discharge Recommendations:  nursing facility, skilled   Discharge Equipment Recommendations:     Barriers to discharge: Severity of debility    Assessment:     Asif Velez is a 79 y.o. male admitted with a medical diagnosis of Acute respiratory failure with hypoxia.  He presents with the following impairments/functional limitations:  weakness, impaired functional mobilty, impaired cognition, decreased safety awareness, impaired coordination, impaired cardiopulmonary response to activity, impaired endurance, decreased coordination, impaired fine motor, impaired balance, decreased upper extremity function, decreased lower extremity function. He cont to have dcr alertness level, low participation ability.    Rehab Prognosis: Fair; patient would benefit from acute skilled PT services to address these deficits and reach maximum level of function.    Recent Surgery: * No surgery found *      Plan:     During this hospitalization, patient to be seen 6 x/week to address the identified rehab impairments via gait training, therapeutic activities, therapeutic exercises, neuromuscular re-education and progress toward the following goals:    · Plan of Care Expires:       Subjective     Chief Complaint: Pt does not make complaints known  Patient/Family Comments/goals: Pt unable to clearly relate personal goals  Pain/Comfort:  · Pain Rating 1: 0/10      Objective:     Communicated with nurse and OT prior to session.  Patient found left sidelying with peripheral IV, bed alarm upon PT entry to room.     General Precautions: Standard, airborne, contact, droplet   Orthopedic Precautions:    Braces:       Functional Mobility:  · Bed Mobility:     · Rolling Left:  total assistance  · Rolling Right: total assistance      AM-PAC 6 CLICK MOBILITY  Turning over in bed (including adjusting bedclothes, sheets and blankets)?: 1  Sitting down on  and standing up from a chair with arms (e.g., wheelchair, bedside commode, etc.): 1  Moving from lying on back to sitting on the side of the bed?: 1  Moving to and from a bed to a chair (including a wheelchair)?: 1  Need to walk in hospital room?: 1  Climbing 3-5 steps with a railing?: 1  Basic Mobility Total Score: 6       Therapeutic Activities and Exercises:   PT (A) to perform AAROM to BLEs in all planes (pt responded /c 30-50% activation during ea task). PT rolled pt to R side to switch extremities.    Patient left left sidelying with call button in reach, bed alarm on, nurse present and pressure points padded..    GOALS:   Multidisciplinary Problems     Physical Therapy Goals        Problem: Physical Therapy Goal    Goal Priority Disciplines Outcome Goal Variances Interventions   Physical Therapy Goal     PT, PT/OT Ongoing, Progressing     Description:  Goals to be met by: D/C     Patient will increase functional independence with mobility by performin. Supine to sit with MInimal Assistance  2. Sit to stand transfer with Minimal Assistance  3. Gait  x 50  feet with Contact Guard Assistance using Rolling Walker.                       Time Tracking:     PT Received On: 20  PT Start Time: 1355     PT Stop Time: 1425  PT Total Time (min): 30 min     Billable Minutes: Therapeutic Activity 15 and Therapeutic Exercise 15    Treatment Type: Treatment  PT/PTA: PT     PTA Visit Number: 1     Maria Esther Ross, PT  2020

## 2020-04-09 NOTE — NURSING
Received telephone report from Marti on Cardio.  Patient transported to room via bed.  Tele, alvarez to drainage bag, 20g right AC and O2 at 7L/NC.  /74, P 69, R 18, T 97.7, O2 sat 93%.

## 2020-04-09 NOTE — NURSING
Report called to RN on 1200.  PT family informed of movement. PT belongings with PT. PT has no complaints at this time

## 2020-04-09 NOTE — ASSESSMENT & PLAN NOTE
Slightly higher today associated with acute kidney injury.  He needs assistance with meals and does have poor appetite.  Encourage oral intake with nutritional supplement as needed.  Patient needs assistance with feeding.  Trending BMP

## 2020-04-09 NOTE — ASSESSMENT & PLAN NOTE
Secondary to COVID-19 and bilateral pneumonia.   Reportedly on 7L however found to be off patient during my encounter.   P.r.n. ABGs and chest x-rays  Please sit patient out in chair for few hours, wean supplemental O2 as able
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
## 2020-04-09 NOTE — PLAN OF CARE
Problem: Occupational Therapy Goal  Goal: Occupational Therapy Goal  Description  Goals to be met by: d/c     Patient will increase functional independence with ADLs by performing:    LE Dressing with Moderate Assistance.  Grooming while seated at sink in w/c with Modified [place].  Toileting from toilet with Minimal Assistance for hygiene and clothing management.   Toilet transfer to toilet with Minimal Assistance.     Outcome: Ongoing, Progressing

## 2020-04-09 NOTE — ASSESSMENT & PLAN NOTE
Known COVID-19 positive with chest x-ray bilateral ground-glass opacities consistent with bilateral pneumonia.  Has been requiring supplemental oxygen, diminished air entry.  CRP 28--> 21-->17.31  Ferritin 208--> 186  Procalcitonin 0.69, lactic acid on admission 2.3.  QTC on admission  MS, QTC on telemetry 0 4/07 475 MS, increased to 508 MS (EKG confirmed 490MS). On 4/9 QTC 434ms.   Continue azithromycin, Plaquenil and Prezcobix to complete course  Avoid steroids, aerosolized procedure  Monitoring QTC with continuous cardiac telemetry and p.r.n. EKG  Trending CRP and ferritin  Isolation precautions  Appreciate pulmonary input  COVID was checked on 03/31, depending on clinical progress will consider repeating in a few days.

## 2020-04-09 NOTE — PLAN OF CARE
04/09/20 0824   Patient Assessment/Suction   Level of Consciousness (AVPU) responds to voice   Respiratory Effort Normal;Unlabored   All Lung Fields Breath Sounds diminished   PRE-TX-O2   O2 Device (Oxygen Therapy) nasal cannula w/ humidification   $ Is the patient on Low Flow Oxygen? Yes   Flow (L/min) 7   SpO2 (!) 94 %   Pulse Oximetry Type Intermittent   $ Pulse Oximetry - Multiple Charge Pulse Oximetry - Multiple   Pulse 86   Resp 18   Inhaler   $ Inhaler Charges MDI (Metered Dose Inahler) Treatment;Given With Spacer   Daily Review of Necessity (Inhaler) completed   Respiratory Treatment Status (Inhaler) given   Treatment Route (Inhaler) mouthpiece;spacer/holding chamber   Patient Position (Inhaler) HOB elevated   Post Treatment Assessment (Inhaler) breath sounds improved   Signs of Intolerance (Inhaler) none   Respiratory Evaluation   $ Care Plan Tech Time 15 min

## 2020-04-09 NOTE — PT/OT/SLP PROGRESS
Occupational Therapy   Treatment    Name: Asif Velez  MRN: [id number]  Admitting Diagnosis:  Acute respiratory failure with hypoxia       Recommendations:     Discharge Recommendations: nursing facility, skilled  Discharge Equipment Recommendations:  (TBD; pt may need shower chair or tub bench)  Barriers to discharge:  Decreased caregiver support    Assessment:     Asif Velez is a 79 y.o. male with a medical diagnosis of Acute respiratory failure with hypoxia.  He presents with lethargy/difficulty maintaining awake/alert state during session.  Able to follow commands and assisted as able with bed mobility. Remains very weak with poor activity tolerance.  Flat affect with minimal verbalizations.  Performance deficits affecting function are weakness, impaired endurance, impaired functional mobilty, impaired self care skills, impaired balance, impaired cognition, impaired cardiopulmonary response to activity, decreased safety awareness.     Rehab Prognosis:  Fair; patient would benefit from acute skilled OT services to address these deficits and reach maximum level of function.       Plan:     Patient to be seen 3 x/week to address the above listed problems via self-care/home management, therapeutic activities, therapeutic exercises  · Plan of Care Expires: 05/07/20  · Plan of Care Reviewed with: patient    Subjective     Pain/Comfort:  · Pain Rating 1: 0/10  · Pain Rating Post-Intervention 1: 0/10    Objective:     Communicated with: RN prior to session.  Patient found supine with peripheral IV, bed alarm upon OT entry to room.    General Precautions: Standard, airborne, contact, droplet, fall   Orthopedic Precautions:N/A   Braces: N/A     Occupational Performance:     Bed Mobility:    · Patient completed Rolling/Turning to Left with  maximal assistance and with side rail x 3 trials for linen change  · Patient completed Rolling/Turning to Right with maximal assistance and with side rail x 3 trials for linen  change  · Patient completed Scooting/Bridging with total assistance and 2 persons  · Patient completed Supine to Sit with maximal assistance  · Patient completed Sit to Supine with maximal assistance     Activities of Daily Living:  · Pt declined    Treatment & Education:  Pt tolerated EOB x5min with CGA; leaning on forearms on tray table in front     Patient left supine with all lines intact, call button in reach, bed alarm on and RN notifiedEducation:      GOALS:   Multidisciplinary Problems     Occupational Therapy Goals        Problem: Occupational Therapy Goal    Goal Priority Disciplines Outcome Interventions   Occupational Therapy Goal     OT, PT/OT Ongoing, Progressing    Description:  Goals to be met by: d/c     Patient will increase functional independence with ADLs by performing:    LE Dressing with Moderate Assistance.  Grooming while seated at sink in w/c with Modified [place].  Toileting from toilet with Minimal Assistance for hygiene and clothing management.   Toilet transfer to toilet with Minimal Assistance.                      Time Tracking:     OT Date of Treatment: 04/09/20  OT Start Time: 1315  OT Stop Time: 1358  OT Total Time (min): 43 min    Billable Minutes:Therapeutic Activity 43    Cali Perez OT  4/9/2020

## 2020-04-09 NOTE — ASSESSMENT & PLAN NOTE
Does have baseline chronic kidney disease stage 3 however serum creatinine increased to 1.8.  Does have BPH with recurrent UTI however Iraheta catheter now in place   Currently on 100 cc IV fluid hydration  Encouraging increased oral intake  Continue to hold lisinopril  Keep Iraheta in place  Renally dose all medications and avoid nephrotoxin drugs  Trending BMP
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
## 2020-04-10 PROBLEM — G93.41 ENCEPHALOPATHY, METABOLIC: Status: ACTIVE | Noted: 2020-01-01

## 2020-04-10 PROBLEM — D75.839 THROMBOCYTOSIS: Chronic | Status: ACTIVE | Noted: 2020-01-01

## 2020-04-10 NOTE — PROGRESS NOTES
"[facility]  Adult Nutrition   Progress Note (Follow-Up)    SUMMARY     Recommendations/Interventions:  1. If decreased intake continues may consider PPN and/or appetite stimulant.    2. Encourage intake of meals and supplements, promote adequate PO intake and honor food preferences that dont interefere with swallowing capabilities. (Poor PO intake x 6 days-will monitor).   3. Continue current diet and supplements as tolerated.     Goals:   1. Patient to increase PO intake to at least 50% of meals and supplements.    Dietitian Rounds Brief:    Pt not eating per RN.  RN promoted him to drink the Ensure Enlive Shake.     Reason for Assessment  Reason For Assessment: RD follow-up  Relevant Medical History: anemia, CKD 3, GERD, HLD, HTN   Interdisciplinary Rounds: attended    Nutrition Risk Screen  Nutrition Risk Screen: dysphagia or difficulty swallowing         Nutrition/Diet History  Patient Reported Diet/Restrictions/Preferences: general  Spiritual, Cultural Beliefs, Latter day Practices, Values that Affect Care: no  Food Allergies: NKFA  Factors Affecting Nutritional Intake: decreased appetite, pain    Anthropometrics  Temp: 98 °F (36.7 °C)  Height Method: Stated  Height: 5' 11" (180.3 cm)  Height (inches): 71 in  Weight Method: Bed Scale  Weight: 88.3 kg (194 lb 10.7 oz)  Weight (lb): 194.67 lb  Ideal Body Weight (IBW), Male: 172 lb  % Ideal Body Weight, Male (lb): 115.74 %  BMI (Calculated): 27.2  BMI Grade: 25 - 29.9 - overweight  Weight Loss: unintentional  Usual Body Weight (UBW), k.8 kg  Weight Change Amount: 26 lb  % Usual Body Weight: 88.66  % Weight Change From Usual Weight: -11.52 %       Weight History:  Wt Readings from Last 10 Encounters:   20 88.3 kg (194 lb 10.7 oz)   20 99.8 kg (220 lb)   20 98.5 kg (217 lb 3.2 oz)   19 99.8 kg (220 lb)   19 100 kg (220 lb 7.4 oz)   19 98.9 kg (218 lb)   19 97 kg (213 lb 13.5 oz)   19 97 kg (213 lb " 14.4 oz)   03/26/19 98.3 kg (216 lb 11.4 oz)   02/11/19 94.4 kg (208 lb 1.8 oz)   }  Lab/Procedures/Meds: Pertinent Labs Reviewed  Clinical Chemistry:  Recent Labs   Lab 04/05/20  0749 04/06/20  0625 04/07/20  0644  04/09/20  0457 04/10/20  0804    146* 146*   < > 147* 145   K 3.7 4.2 4.0   < > 3.9 4.2   * 112* 114*   < > 113* 117*   CO2 18* 20* 19*   < > 20* 18*    101 107   < > 104 88   BUN 33* 34* 38*   < > 57* 46*   CREATININE 1.4 1.4 1.5*   < > 2.0* 1.7*   CALCIUM 8.9 9.2 9.3   < > 9.2 9.3   PROT 7.9 8.3 8.1   < >  --  7.9   ALBUMIN 2.8* 2.8* 2.8*   < >  --  2.8*   BILITOT 0.7 0.8 0.7   < >  --  0.7   ALKPHOS 83 90 94   < >  --  106   * 93* 62*   < >  --  47*   ALT 50* 55* 47*   < >  --  39   ANIONGAP 12 14 13   < > 14 10   ESTGFRAFRICA 54.5* 54.5* 50.1*   < > 35.4* 43.1*   EGFRNONAA 47.1* 47.1* 43.3*   < > 30.6* 37.3*   MG 1.6 1.7 1.8  --  2.1  --    PHOS 3.7 4.3 4.0  --   --   --     < > = values in this interval not displayed.     CBC:   Recent Labs   Lab 04/10/20  0804   WBC 7.99   RBC 4.45*   HGB 11.1*   HCT 36.0*   *   MCV 81*   MCH 24.9*   MCHC 30.8*     Cardiac Profile:  Recent Labs   Lab 04/04/20  1450 04/07/20  0644   BNP 91  --    * 346*   TROPONINI 0.038  --      Inflammatory Labs:  Recent Labs   Lab 04/06/20  0625 04/08/20  0617 04/10/20  0804   CRP 21.47* 17.31* 11.03*       Medications: Pertinent Medications reviewed  Scheduled Meds:   albuterol  4 puff Inhalation Q8H    alfuzosin  10 mg Oral Daily with breakfast    amLODIPine  10 mg Oral Daily    aspirin  324 mg Oral Daily    azithromycin  250 mg Oral Daily    carvediloL  25 mg Oral BID WM    chlorhexidine  15 mL Mouth/Throat BID    darunavir-cobicistat  1 tablet Oral Daily    enoxaparin  40 mg Subcutaneous Daily    hydroxychloroquine  400 mg Oral Daily    mupirocin   Nasal BID    pantoprazole  40 mg Oral Daily     Continuous Infusions:   sodium chloride 0.45% 100 mL/hr at 04/09/20 2377      PRN Meds:.acetaminophen, albuterol **AND** MDI Q4H PRN, calcium gluconate IVPB, calcium gluconate IVPB, calcium gluconate IVPB, HYDROcodone-acetaminophen, magnesium sulfate IVPB, magnesium sulfate IVPB, ondansetron, polyethylene glycol, potassium chloride in water **AND** potassium chloride in water **AND** potassium chloride in water, sodium chloride 0.9%, sodium phosphate IVPB, sodium phosphate IVPB, sodium phosphate IVPB    Estimated/Assessed Needs    Weight Used For Calorie Calculations: 88.3 kg (194 lb 10.7 oz)  Energy Calorie Requirements (kcal): 1302-1576 kcal/day (20-25 kcal/kg)  Energy Need Method: Kcal/kg  Protein Requirements: 71-88 g/day (.8-1.0 g/kg)  Weight Used For Protein Calculations: 88.3 kg (194 lb 10.7 oz)     Estimated Fluid Requirement Method: RDA Method    Nutrition Prescription Ordered    Current Diet Order: Dysphagia Diet     Evaluation of Received Nutrient/Fluid Intake     % Intake of Estimated Energy Needs: 0 - 25 %  % Meal Intake: 0 - 25 %    Intake/Output Summary (Last 24 hours) at 4/10/2020 1039  Last data filed at 4/10/2020 0228  Gross per 24 hour   Intake 240 ml   Output 400 ml   Net -160 ml         Monitor and Evaluation    Food and Nutrient Intake: energy intake, food and beverage intake  Food and Nutrient Adminstration: diet order  Anthropometric Measurements: weight change, weight  Biochemical Data, Medical Tests and Procedures: electrolyte and renal panel, gastrointestinal profile, glucose/endocrine profile, lipid profile, inflammatory profile  Nutrition-Focused Physical Findings: overall appearance     Nutrition Follow-Up    RD Follow-up?: Yes     Nohemy Garcia RD 4/10/20

## 2020-04-10 NOTE — ASSESSMENT & PLAN NOTE
Secondary to COVID-19 and bilateral pneumonia.   Still requiring supplemental oxygen with not much improvement.  Chest x-ray 4/10 with increased bilateral interstitial opacities.  Continue supplemental oxygen.  Dr. Pineda discussed with family on on 04/09 was made DNR DNI

## 2020-04-10 NOTE — PT/OT/SLP PROGRESS
Occupational Therapy   Treatment    Name: Asif Velez  MRN: [id number]  Admitting Diagnosis:  Acute respiratory failure with hypoxia       Recommendations:     Discharge Recommendations: nursing facility, skilled  Discharge Equipment Recommendations:  (TBD; pt may need shower chair or tub bench)  Barriers to discharge:  Decreased caregiver support    Assessment:     Asif Velez is a 79 y.o. male with a medical diagnosis of Acute respiratory failure with hypoxia.  He presents with general weakness secondary to positive COVID-19 diagnosis. Performance deficits affecting function are weakness, impaired self care skills, impaired endurance, impaired functional mobilty, gait instability, impaired balance, impaired cardiopulmonary response to activity.     Rehab Prognosis:  Fair; patient would benefit from acute skilled OT services to address these deficits and reach maximum level of function.       Plan:     Patient to be seen 3 x/week to address the above listed problems via self-care/home management, therapeutic activities, therapeutic exercises  · Plan of Care Expires: 05/07/20  · Plan of Care Reviewed with: patient    Subjective     Pain/Comfort:  · Pain Rating 1: 0/10  · Pain Rating Post-Intervention 1: 0/10    Objective:     Communicated with: nurse prior to session.  Patient found HOB elevated with telemetry, peripheral IV, pulse ox (continuous), oxygen, alvarez catheter upon OT entry to room.    General Precautions: Standard, airborne, contact, droplet, fall   Orthopedic Precautions:N/A   Braces: N/A     Occupational Performance:     Bed Mobility:    · Patient completed Scooting/Bridging with maximal assistance  · Patient completed Supine to Sit with maximal assistance  · Patient completed Sit to Supine with maximal assistance   · Performed unsupported sitting EOB for 10 minutes with contact guard assistance.    Functional Mobility/Transfers:  · Sit to stand x 1 trial with maximal assistance and 2 hand  old.  ·   Activities of Daily Living:  · Lower Body Dressing: minimum assistance to don/doff sock sitting EOB.      [facility] 6 Click ADL:      Treatment & Education:  Patient able to sit EOB for 10 minutes unsupported while perform ADL activity before fatigue set in.     Patient left HOB elevated with all lines intact, call button in reach and bed alarm onEducation:      GOALS:   Multidisciplinary Problems     Occupational Therapy Goals        Problem: Occupational Therapy Goal    Goal Priority Disciplines Outcome Interventions   Occupational Therapy Goal     OT, PT/OT Ongoing, Progressing    Description:  Goals to be met by: d/c     Patient will increase functional independence with ADLs by performing:    LE Dressing with Moderate Assistance.  Grooming while seated at sink in w/c with Modified [place].  Toileting from toilet with Minimal Assistance for hygiene and clothing management.   Toilet transfer to toilet with Minimal Assistance.                      Time Tracking:     OT Date of Treatment: 04/10/20  OT Start Time: 1014  OT Stop Time: 1038  OT Total Time (min): 24 min    Billable Minutes:Self Care/Home Management 10  Therapeutic Activity 14    Sandoval Vera OT  4/10/2020

## 2020-04-10 NOTE — CARE UPDATE
04/10/20 0915   PRE-TX-O2   SpO2 95 %   Pulse 97   Resp (!) 24   Inhaler   $ Inhaler Charges MDI (Metered Dose Inahler) Treatment   Daily Review of Necessity (Inhaler) completed   Respiratory Treatment Status (Inhaler) given   Treatment Route (Inhaler) spacer/holding chamber   Patient Position (Inhaler) HOB elevated   Post Treatment Assessment (Inhaler) increased aeration   Signs of Intolerance (Inhaler) none   Peak Flow   PEFR PREtreatment (L/Min) 6

## 2020-04-10 NOTE — PLAN OF CARE
Problem: Occupational Therapy Goal  Goal: Occupational Therapy Goal  Description  Goals to be met by: d/c     Patient will increase functional independence with ADLs by performing:    LE Dressing with Moderate Assistance.  Grooming while seated at sink in w/c with Modified [place].  Toileting from toilet with Minimal Assistance for hygiene and clothing management.   Toilet transfer to toilet with Minimal Assistance.     Outcome: Ongoing, Progressing

## 2020-04-10 NOTE — PLAN OF CARE
04/09/20 8555   Patient Assessment/Suction   Level of Consciousness (AVPU) alert   Respiratory Effort Normal;Unlabored   Expansion/Accessory Muscles/Retractions no use of accessory muscles   All Lung Fields Breath Sounds diminished   PRE-TX-O2   O2 Device (Oxygen Therapy) nasal cannula   Flow (L/min) 8   SpO2 98 %   Pulse Oximetry Type Intermittent   $ Pulse Oximetry - Multiple Charge Pulse Oximetry - Multiple   Pulse (!) 116   Resp (!) 24   Inhaler   $ Inhaler Charges MDI (Metered Dose Inahler) Treatment   Daily Review of Necessity (Inhaler) completed   Respiratory Treatment Status (Inhaler) given   Treatment Route (Inhaler) spacer/holding chamber   Patient Position (Inhaler) HOB elevated   Post Treatment Assessment (Inhaler) increased aeration   Signs of Intolerance (Inhaler) none   CONTINUE TX. AS ORDERED

## 2020-04-10 NOTE — ASSESSMENT & PLAN NOTE
Patient continues with decreased responsiveness with generalized weakness, poor appetite.  Only mumbling few words.  Suspect multifactorial secondary to infection, hypoxic respiratory failure, acute kidney injury, decreased nutrition.  Discussed with family however not ready for comfort directed care  Delirium precautions
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
## 2020-04-10 NOTE — PLAN OF CARE
Problem: Physical Therapy Goal  Goal: Physical Therapy Goal  Description  Goals to be met by: D/C     Patient will increase functional independence with mobility by performin. Supine to sit with MInimal Assistance  2. Sit to stand transfer with Minimal Assistance  3. Gait  x 50  feet with Contact Guard Assistance using Rolling Walker.      Outcome: Ongoing, Progressing

## 2020-04-10 NOTE — ASSESSMENT & PLAN NOTE
Known COVID-19 positive with chest x-ray bilateral ground-glass opacities consistent with bilateral pneumonia.  Has been requiring supplemental oxygen, diminished air entry.  CRP 28--> 21-->17.31-->11.03  Ferritin 208--> 186  Procalcitonin 0.69, lactic acid on admission 2.3.  QTC on admission  MS, QTC on telemetry 0 4/07 475 MS, increased to 508 MS (EKG confirmed 490MS). On 4/9 QTC 434ms.   Patient has now completed course of azithromycin and Plaquenil.  Complete course of Prezcobix  Avoid steroids, aerosolized procedure  Trending CRP and ferritin  Isolation precautions  Appreciate pulmonary input  COVID was checked on 03/31, depending on clinical progress will consider repeating in a few days.

## 2020-04-10 NOTE — ASSESSMENT & PLAN NOTE
Patient with severe generalized weakness likely due to viral prodrome with decreased reserves at baseline.

## 2020-04-10 NOTE — PT/OT/SLP PROGRESS
Physical Therapy Treatment    Patient Name:  Asif Velez   MRN:  [id number]    Recommendations:     Discharge Recommendations:  nursing facility, skilled   Discharge Equipment Recommendations:     Barriers to discharge: None    Assessment:     Asif Velez is a 79 y.o. male admitted with a medical diagnosis of Acute respiratory failure with hypoxia.  He presents with the following impairments/functional limitations:  weakness, impaired self care skills, impaired endurance, impaired functional mobilty, decreased lower extremity function, impaired cardiopulmonary response to activity.  Patient agreeable to bedside there ex only.    Rehab Prognosis: Fair; patient would benefit from acute skilled PT services to address these deficits and reach maximum level of function.    Recent Surgery: * No surgery found *      Plan:     During this hospitalization, patient to be seen 6 x/week to address the identified rehab impairments via gait training, therapeutic activities, therapeutic exercises, neuromuscular re-education and progress toward the following goals:    · Plan of Care Expires:       Subjective     Chief Complaint: generalized weakness  Patient/Family Comments/goals: improve overall strength/mobility  Pain/Comfort:  · Pain Rating 1: 0/10  · Pain Rating 2: 0/10      Objective:     Communicated with nurse prior to session.  Patient found supine with blood pressure cuff, alvarez catheter, peripheral IV, telemetry upon PT entry to room.     General Precautions: Standard, airborne   Orthopedic Precautions:N/A   Braces: N/A     Functional Mobility:  · n/a      AM-PAC 6 CLICK MOBILITY  Turning over in bed (including adjusting bedclothes, sheets and blankets)?: 2  Sitting down on and standing up from a chair with arms (e.g., wheelchair, bedside commode, etc.): 1  Moving from lying on back to sitting on the side of the bed?: 1  Moving to and from a bed to a chair (including a wheelchair)?: 1  Need to walk in hospital room?:  1  Climbing 3-5 steps with a railing?: 1  Basic Mobility Total Score: 7       Therapeutic Activities and Exercises:   x 15 supine arun. LE there ex = HS, hip ABD/ADD, AP, SLR    Patient left supine with all lines intact and call button in reach..    GOALS:   Multidisciplinary Problems     Physical Therapy Goals        Problem: Physical Therapy Goal    Goal Priority Disciplines Outcome Goal Variances Interventions   Physical Therapy Goal     PT, PT/OT Ongoing, Progressing     Description:  Goals to be met by: D/C     Patient will increase functional independence with mobility by performin. Supine to sit with MInimal Assistance  2. Sit to stand transfer with Minimal Assistance  3. Gait  x 50  feet with Contact Guard Assistance using Rolling Walker.                       Time Tracking:     PT Received On: 04/10/20  PT Start Time: 1145     PT Stop Time: 1200  PT Total Time (min): 15 min     Billable Minutes: Therapeutic Exercise 15    Treatment Type: Treatment  PT/PTA: PT     PTA Visit Number: 0     Srinivasan Mccullough, PT  04/10/2020

## 2020-04-10 NOTE — ASSESSMENT & PLAN NOTE
Does have baseline chronic kidney disease stage 3 however serum creatinine increased to 1.7.  Does have BPH with recurrent UTI however Iraheta catheter now in place   Decreasing IV fluids given chest x-ray and physical examination  Encouraging increased oral intake-he is not eating much and is totally dependent  Continue to hold lisinopril  Keep Iraheta in place  Renally dose all medications and avoid nephrotoxin drugs  Trending BMP
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
## 2020-04-11 PROBLEM — E87.0 HYPERNATREMIA: Status: RESOLVED | Noted: 2020-01-01 | Resolved: 2020-01-01

## 2020-04-11 NOTE — PT/OT/SLP PROGRESS
"Speech Language Pathology Treatment    Patient Name:  Asif Velez   MRN:  [id number]  Admitting Diagnosis: Acute respiratory failure with hypoxia    Recommendations:                 General Recommendations:  Dysphagia therapy/ongoing assessment of swallow  Diet recommendations:    Not accepting solids at this time; unable to re-assess purees/solids;   Liquid Diet Level: Thin(any liquids (not able to reassess with pudding due to pt refusal); needs assist and 90 degree position; single sips)   Aspiration Precautions: Constant, direct assistance with meals, Eliminate distractions, Feed only when awake/alert, HOB to 90 degrees, Meds crushed in liquids as tolerated, Monitor for s/s of aspiration, Strict aspiration precautions and Wear oxygen during intake   General Precautions: Standard, aspiration, contact, droplet, fall, airborne, special contact, respiratory  Communication strategies:  provide increased time to answer and go to room if call light pushed    Subjective     "That's good." (water)  Patient goals: none stated     Pain/Comfort:  · Pain Rating 1: 0/10    Objective:     Has the patient been evaluated by SLP for swallowing?   Yes  Keep patient NPO? No   Current Respiratory Status: nasal cannula      Pt followed for swallow safety/ongoing swallow assessment as needed/tolerated.  Pt seen after checking with nursing.  Pt alert, sitting upright in bed.  Agreeable to visit.  Pt not able to follow oral motor instructions; had difficulty opening mouth to accept small ice chips but appeared to enjoy ice.  No overt sign of aspiration demonstrated during or after ice chips swallows 2 of 2.  Pt tolerated tsp water ("that's good").  He accepted single sips water from cup held by clinician x6 with no overt sign of aspiration.  He stated he enjoyed the water ("let's go").  He held the water in oral cavity 2-3 seconds prior to swallow.  He refused solids and purees.  Results of session discussed with nursing after session.  " "    Rec: offer liquids in single straw sips and crushed meds as tolerated, following aspiration precautions (likely will do better if meds crushed into liquid such as cold water and taken by straw (discussed with nursing).    Assessment:     Asif Velez is a 79 y.o. male with an SLP diagnosis of Dysphagia and signs of cognitive impairment/communication impairment  He presents with tolerance of single sips cold liquid from straw with cup held by clinician and pt positioned at 90 degrees.  He is refusing food. He is automatic on straw; does not seem to be able to manage or not comfortable with spoon trials at this time (may not possess adequate/appropriate oral control to manage spoon and items thicker than liquid -- unclear, as he refused food trials ("no")).    Goals:   Multidisciplinary Problems     SLP Goals        Problem: SLP Goal    Goal Priority Disciplines Outcome   SLP Goal     SLP Ongoing, Not Progressing   Description:  1. Ongoing assessment of swallow function with direct intervention as needed                     Plan:     · Patient to be seen:  (2-5/wk depending on progress/status)   · Plan of Care expires:  04/25/20  · Plan of Care reviewed with:  patient(nurse)   · SLP Follow-Up:  Yes       Discharge recommendations:  (skilled nursing vs long-term care)   Barriers to Discharge:  nutrition issues    Time Tracking:     SLP Treatment Date:   04/11/20  Speech Start Time:  1340  Speech Stop Time:  1354     Speech Total Time (min):  14 min    Billable Minutes: Treatment Swallowing Dysfunction 14    Barb Jefferson CCC-SLP  04/11/2020  "
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
## 2020-04-11 NOTE — ASSESSMENT & PLAN NOTE
Does have baseline chronic kidney disease stage 3 however serum creatinine increased to 1.7.  Does have BPH with recurrent UTI however Iraheta catheter now in place   Stop IV fluids given respiratory examination and clinical status  Continue to hold lisinopril  Keep Iraheta in place  Renally dose all medications and avoid nephrotoxin drugs  Trending BMP

## 2020-04-11 NOTE — PLAN OF CARE
Accepting liquid by straw; swallow delayed but no overt sign of aspiration.  Refused pudding/food.

## 2020-04-11 NOTE — NURSING
Patient satting 93% on 8Lhigh flow nasal cannula.  Patient at times pulls his oxygen off and his sats drop into the 80's.  He has been confused & weak during the night.

## 2020-04-11 NOTE — PT/OT/SLP PROGRESS
Physical Therapy Treatment    Patient Name:  Asif Velez   MRN:  [id number]    Recommendations:     Discharge Recommendations:  nursing facility, skilled   Discharge Equipment Recommendations:     Barriers to discharge: Covid-19 positive    Assessment:     Asif Velez is a 79 y.o. male admitted with a medical diagnosis of Acute respiratory failure with hypoxia.  He presents with the following impairments/functional limitations:  weakness, impaired self care skills, impaired endurance, impaired functional mobilty, decreased lower extremity function, impaired cardiopulmonary response to activity.  Patient agreeable to bedside there ex.    Rehab Prognosis: Fair; patient would benefit from acute skilled PT services to address these deficits and reach maximum level of function.    Recent Surgery: * No surgery found *      Plan:     During this hospitalization, patient to be seen 6 x/week to address the identified rehab impairments via gait training, therapeutic activities, therapeutic exercises, neuromuscular re-education and progress toward the following goals:    · Plan of Care Expires:       Subjective     Chief Complaint: generalized weakness  Patient/Family Comments/goals: improve strength/mobility  Pain/Comfort:  · Pain Rating 1: 0/10  · Pain Rating 2: 0/10      Objective:     Communicated with nurse prior to session.  Patient found supine with oxygen, peripheral IV, pulse ox (continuous), telemetry upon PT entry to room.     General Precautions: Standard, airborne   Orthopedic Precautions:N/A   Braces: N/A     Functional Mobility:  · n/a      AM-PAC 6 CLICK MOBILITY  Turning over in bed (including adjusting bedclothes, sheets and blankets)?: 2  Sitting down on and standing up from a chair with arms (e.g., wheelchair, bedside commode, etc.): 1  Moving from lying on back to sitting on the side of the bed?: 1  Moving to and from a bed to a chair (including a wheelchair)?: 1  Need to walk in hospital room?: 1  Climbing  3-5 steps with a railing?: 1  Basic Mobility Total Score: 7       Therapeutic Activities and Exercises:   x 15 supine arun. LE there ex = HS, hip ABD/ADD, AP, SLR    Patient left supine with all lines intact and call button in reach..    GOALS:   Multidisciplinary Problems     Physical Therapy Goals        Problem: Physical Therapy Goal    Goal Priority Disciplines Outcome Goal Variances Interventions   Physical Therapy Goal     PT, PT/OT Ongoing, Progressing     Description:  Goals to be met by: D/C     Patient will increase functional independence with mobility by performin. Supine to sit with MInimal Assistance  2. Sit to stand transfer with Minimal Assistance  3. Gait  x 50  feet with Contact Guard Assistance using Rolling Walker.                       Time Tracking:     PT Received On: 20  PT Start Time: 1030     PT Stop Time: 1045  PT Total Time (min): 15 min     Billable Minutes: Therapeutic Exercise 15    Treatment Type: Treatment  PT/PTA: PT     PTA Visit Number: 0     Srinivasan Mccullough, PT  2020
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
## 2020-04-11 NOTE — CARE UPDATE
ABG reviewed and discussed with respiratory therapist.  CO2/O2 acceptable, bicarbonate 15, checking lactic acid and beta hydroxybutyrate.  Called and updated daughter Rosanne Mendoza, she is aware that he had a rough night last night, confused/pulling off oxygen however states he did speak with them over the phone last night however did not speak today on the phone. Mentioned the zoom video conference and family is aware that this is an option available to be able to see patient given restrictions with covid pandemic.  Discussed decreased responsiveness/encephalopathy, clinical status.  She asked about medication and conveyed that he had indeed completed 5 days of azithromycin and Plaquenil.  Aware that patient is at high risk for decline including death.  All questions/concerns addressed.  She was for the appreciative for the call.

## 2020-04-11 NOTE — ASSESSMENT & PLAN NOTE
Patient with severe generalized weakness likely due to viral prodrome with decreased reserves at baseline.    Continues to worsen completely bed bound, dependent, encephalopathic

## 2020-04-11 NOTE — ASSESSMENT & PLAN NOTE
Patient continues with decreased responsiveness with generalized weakness, poor appetite.  Only mumbling few words.  Suspect multifactorial secondary to infection, hypoxic respiratory failure, acute kidney injury, decreased nutrition.  Discussed with family however not ready for comfort directed care  Delirium precautions  Check ABG today will attempt to readdressed again

## 2020-04-11 NOTE — PLAN OF CARE
04/11/20 0800   Patient Assessment/Suction   Level of Consciousness (AVPU) responds to voice   Respiratory Effort Unlabored;Normal   Expansion/Accessory Muscles/Retractions no use of accessory muscles;no retractions;expansion symmetric   All Lung Fields Breath Sounds clear   Rhythm/Pattern, Respiratory unlabored;pattern regular;depth regular;chest wiggle adequate   Cough Frequency infrequent   Cough Type good   PRE-TX-O2   O2 Device (Oxygen Therapy) High Flow nasal Cannula   $ Is the patient on Low Flow Oxygen? Yes   Flow (L/min) 4   SpO2 97 %   Pulse Oximetry Type Intermittent   $ Pulse Oximetry - Multiple Charge Pulse Oximetry - Multiple   Pulse 79   Resp 18   Inhaler   $ Inhaler Charges MDI (Metered Dose Inahler) Treatment;Given With Spacer;Mouth rinsed post treatment   Daily Review of Necessity (Inhaler) completed   Respiratory Treatment Status (Inhaler) given   Treatment Route (Inhaler) spacer/holding chamber   Patient Position (Inhaler) HOB elevated   Post Treatment Assessment (Inhaler) increased aeration   Signs of Intolerance (Inhaler) none   Breath Sounds Post-Respiratory Treatment   Throughout All Fields Post-Treatment All Fields   Throughout All Fields Post-Treatment aeration increased   Post-treatment Heart Rate (beats/min) 75   Post-treatment Resp Rate (breaths/min) 18

## 2020-04-11 NOTE — ASSESSMENT & PLAN NOTE
Known COVID-19 positive with chest x-ray bilateral ground-glass opacities consistent with bilateral pneumonia.  Has been requiring supplemental oxygen, diminished air entry.  CRP 28--> 21-->17.31-->11.03  Ferritin 208--> 186  Procalcitonin 0.69, lactic acid on admission 2.3.  QTC on admission  MS, QTC on telemetry 0 4/07 475 MS, increased to 508 MS (EKG confirmed 490MS). On 4/9 QTC 434ms.   Patient has now completed course of azithromycin and Plaquenil.  Complete course of Prezcobix  Avoid steroids, aerosolized procedure  Trending CRP and ferritin  Isolation precautions  Appreciate pulmonary input  COVID was checked on 03/31

## 2020-04-11 NOTE — ASSESSMENT & PLAN NOTE
Secondary to COVID-19 and bilateral pneumonia.   Chest x-ray 4/10 with increased bilateral interstitial opacities.  Continue supplemental oxygen.  Dr. Pineda discussed with family on on 04/09 was made DNR DNI  Patient continues to decline with no improvement, continue supplemental oxygen  Discontinue IV fluids  Continue to address goals of care with family, poor prognosis

## 2020-04-11 NOTE — PLAN OF CARE
04/10/20 9374   Patient Assessment/Suction   Level of Consciousness (AVPU) responds to voice   Respiratory Effort Mild;Labored   Expansion/Accessory Muscles/Retractions no use of accessory muscles   All Lung Fields Breath Sounds   (scattered crackles noted)   PRE-TX-O2   SpO2 (!) 91 %   Pulse 73   Resp 20   Inhaler   $ Inhaler Charges MDI (Metered Dose Inahler) Treatment   Daily Review of Necessity (Inhaler) completed   Respiratory Treatment Status (Inhaler) given   Treatment Route (Inhaler) spacer/holding chamber   Patient Position (Inhaler) HOB elevated   Post Treatment Assessment (Inhaler) increased aeration   Signs of Intolerance (Inhaler) none   Breath Sounds Post-Respiratory Treatment   Throughout All Fields Post-Treatment All Fields   Throughout All Fields Post-Treatment aeration increased   Post-treatment Heart Rate (beats/min) 73   Post-treatment Resp Rate (breaths/min) 20   continue tx. As ordrered
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
## 2020-04-12 NOTE — ASSESSMENT & PLAN NOTE
Secondary to COVID-19 and bilateral pneumonia.   Chest x-ray 4/10 with increased bilateral interstitial opacities.  Continue supplemental oxygen.  Dr. Pineda discussed with family on on 04/09 was made DNR DNI  Patient continues to decline with no improvement, continue supplemental oxygen  Continue to address goals of care with family, poor prognosis, discussed again on 04/12 and not ready for comfort care

## 2020-04-12 NOTE — ASSESSMENT & PLAN NOTE
CO2 still low however better.  ABG was checked.  Lactic acid and beta hydroxy was also checked.

## 2020-04-12 NOTE — ASSESSMENT & PLAN NOTE
Does have baseline chronic kidney disease stage 3 however serum creatinine increased to 1.7.  Does have BPH with recurrent UTI however Iraheta catheter now in place   Slow IV fluids given not taking oral intake due to encephalopathy  Continue to hold lisinopril  Keep Iraheta in place  Renally dose all medications and avoid nephrotoxin drugs  Trending BMP
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
## 2020-04-12 NOTE — ASSESSMENT & PLAN NOTE
Patient continues with decreased responsiveness with generalized weakness, poor appetite.  Only mumbling few words.  Suspect multifactorial secondary to infection, hypoxic respiratory failure, acute kidney injury, decreased nutrition, COVID encephalitis?  Delirium precautions  ABG obtained 4/11 without significant hypoxia or hypercapnia.  Lactic acid, beta hydroxy, ammonia all checked.  Obtained CT head 4/12 without any abnormalities given hypercoagulable state with COVID-19

## 2020-04-12 NOTE — PLAN OF CARE
04/12/20 0757   Patient Assessment/Suction   Level of Consciousness (AVPU) responds to voice   Respiratory Effort Unlabored;Normal   Expansion/Accessory Muscles/Retractions no use of accessory muscles;no retractions;expansion symmetric   All Lung Fields Breath Sounds clear;diminished   Rhythm/Pattern, Respiratory unlabored;pattern regular;depth regular   Cough Frequency infrequent   Cough Type fair   PRE-TX-O2   O2 Device (Oxygen Therapy) High Flow nasal Cannula   $ Is the patient on Low Flow Oxygen? Yes   Flow (L/min) 3   SpO2 95 %   Pulse Oximetry Type Intermittent   $ Pulse Oximetry - Multiple Charge Pulse Oximetry - Multiple   Pulse 75   Resp 18   Inhaler   $ Inhaler Charges MDI (Metered Dose Inahler) Treatment;Given With Spacer;Mouth rinsed post treatment   Daily Review of Necessity (Inhaler) completed   Respiratory Treatment Status (Inhaler) given   Treatment Route (Inhaler) mouthpiece;spacer/holding chamber   Patient Position (Inhaler) HOB elevated   Post Treatment Assessment (Inhaler) increased aeration   Signs of Intolerance (Inhaler) none   Breath Sounds Post-Respiratory Treatment   Throughout All Fields Post-Treatment All Fields   Throughout All Fields Post-Treatment aeration increased   Post-treatment Heart Rate (beats/min) 90   Post-treatment Resp Rate (breaths/min) 17

## 2020-04-13 PROBLEM — E44.0 MALNUTRITION OF MODERATE DEGREE: Chronic | Status: ACTIVE | Noted: 2020-01-01

## 2020-04-13 NOTE — PLAN OF CARE
Problem: Physical Therapy Goal  Goal: Physical Therapy Goal  Description  Goals to be met by: D/C     Patient will increase functional independence with mobility by performin. Supine to sit with MInimal Assistance  2. Sit to stand transfer with Minimal Assistance  3. Gait  x 50  feet with Contact Guard Assistance using Rolling Walker.      Outcome: Ongoing, Not Progressing   Pt with slow progression 2/2 confusion and lethargy

## 2020-04-13 NOTE — PT/OT/SLP PROGRESS
Occupational Therapy   Treatment    Name: Asif Velez  MRN: [id number]  Admitting Diagnosis:  Acute respiratory failure with hypoxia       Recommendations:     Discharge Recommendations: nursing facility, skilled  Discharge Equipment Recommendations:  (TBD; pt may need shower chair or tub bench)  Barriers to discharge:  Decreased caregiver support    Assessment:     Asif Velez is a 79 y.o. male with a medical diagnosis of Acute respiratory failure with hypoxia.  He presents with decreased endurance, increased physical assistance and low activity tolerance compared to last Friday's OT session. Performance deficits affecting function are weakness, impaired endurance, impaired self care skills, impaired functional mobilty, gait instability, impaired balance, visual deficits, impaired cardiopulmonary response to activity.     Rehab Prognosis:  Poor; patient would benefit from acute skilled OT services to address these deficits and reach maximum level of function.       Plan:     Patient to be seen 3 x/week to address the above listed problems via self-care/home management, therapeutic activities, therapeutic exercises  · Plan of Care Expires: 05/07/20  · Plan of Care Reviewed with: patient    Subjective     Pain/Comfort:  · Pain Rating 1: 5/10  · Pain Rating Post-Intervention 1: 5/10    Objective:     Communicated with: nurse prior to session.  Patient found supine with telemetry, peripheral IV, oxygen, pulse ox (continuous) upon OT entry to room.    General Precautions: Standard, fall   Orthopedic Precautions:N/A   Braces: N/A     Occupational Performance:     Bed Mobility:    · Patient completed Scooting/Bridging with maximal assistance  · Patient completed Supine to Sit with maximal assistance  · Patient completed Sit to Supine with maximal assistance   · Performed unsupported sitting EOB for 1 minute with minimal assistance.      Activities of Daily Living:  · Lower Body Dressing: total assistance to don socks bed  level.      [facility] 6 Click ADL: 8    Treatment & Education:  Patient's speech mumbled and not able to tolerate sitting EOB for more than 1 minute. Required increased physical assistance while sitting compared to last Friday's session.    Patient left supine with all lines intact, call button in reach and bed alarm onEducation:      GOALS:   Multidisciplinary Problems     Occupational Therapy Goals        Problem: Occupational Therapy Goal    Goal Priority Disciplines Outcome Interventions   Occupational Therapy Goal     OT, PT/OT Ongoing, Progressing    Description:  Goals to be met by: d/c     Patient will increase functional independence with ADLs by performing:    LE Dressing with Moderate Assistance.  Grooming while seated at sink in w/c with Modified [place].  Toileting from toilet with Minimal Assistance for hygiene and clothing management.   Toilet transfer to toilet with Minimal Assistance.                      Time Tracking:     OT Date of Treatment: 04/13/20  OT Start Time: 1006  OT Stop Time: 1030  OT Total Time (min): 24 min    Billable Minutes:Self Care/Home Management 12  Therapeutic Activity 12    Sandoval Vera OT  4/13/2020

## 2020-04-13 NOTE — ASSESSMENT & PLAN NOTE
Secondary to COVID-19 and bilateral pneumonia.   Chest x-ray 4/10 with increased bilateral interstitial opacities.  Continue supplemental oxygen.  Dr. Pineda discussed with family on on 04/09 was made DNR DNI  Patient continues to decline with no improvement, he continues to pull off oxygen  Continue to address goals of care with family, poor prognosis, discussed again on 04/13 and not ready for comfort care but asked for them to repeat family discussion and will re-evaluate this evening.

## 2020-04-13 NOTE — PT/OT/SLP PROGRESS
Physical Therapy Treatment    Patient Name:  Asif Velez   MRN:  [id number]    Recommendations:     Discharge Recommendations:  nursing facility, skilled   Discharge Equipment Recommendations:     Barriers to discharge: None    Assessment:     Asif Velez is a 79 y.o. male admitted with a medical diagnosis of Acute respiratory failure with hypoxia.  He presents with the following impairments/functional limitations:  weakness, impaired endurance, impaired self care skills, gait instability, impaired functional mobilty, impaired cognition, impaired cardiopulmonary response to activity, pain. Pt with difficulty keeping eyes open throughout tx. Pt transferred from supine to sitting EOB requiring total A of 2. Pt returned self to R sidelying noting Pt unable to maintain upright position with max A x 2.  Continue with PT and POC.     Rehab Prognosis: Poor; patient would benefit from acute skilled PT services to address these deficits and reach maximum level of function.    Recent Surgery: * No surgery found *      Plan:     During this hospitalization, patient to be seen 6 x/week to address the identified rehab impairments via gait training, therapeutic activities, therapeutic exercises and progress toward the following goals:    · Plan of Care Expires:       Subjective     Chief Complaint: Pt agreeable to tx.   Patient/Family Comments/goals: return home   Pain/Comfort:  ·        Objective:     Communicated with RN prior to session.  Patient found HOB elevated with bed alarm, telemetry, peripheral IV, pulse ox (continuous) upon PT entry to room.     General Precautions: Standard, airborne   Orthopedic Precautions:N/A   Braces:       Functional Mobility:  · Bed Mobility:     · Scooting: total assistance  · Supine to Sit: total assistance  · Sit to Supine: maximal assistance      AM-PAC 6 CLICK MOBILITY          Therapeutic Activities and Exercises:   bed mobility; sitting EOB for trunk control and midline  orientation    Patient left HOB elevated with all lines intact, call button in reach, bed alarm on and RN notified..    GOALS:   Multidisciplinary Problems     Physical Therapy Goals        Problem: Physical Therapy Goal    Goal Priority Disciplines Outcome Goal Variances Interventions   Physical Therapy Goal     PT, PT/OT Ongoing, Not Progressing     Description:  Goals to be met by: D/C     Patient will increase functional independence with mobility by performin. Supine to sit with MInimal Assistance  2. Sit to stand transfer with Minimal Assistance  3. Gait  x 50  feet with Contact Guard Assistance using Rolling Walker.                       Time Tracking:     PT Received On: 20  PT Start Time: 1328     PT Stop Time: 1351  PT Total Time (min): 23 min     Billable Minutes: Therapeutic Activity 23    Treatment Type: Treatment  PT/PTA: PTA     PTA Visit Number: 1     Hillary Hammant, PTA  2020

## 2020-04-13 NOTE — PLAN OF CARE
04/12/20 2200   Respiratory Evaluation   $ Care Plan Tech Time 15 min   Evaluation For   (CARE PLAN)

## 2020-04-13 NOTE — ASSESSMENT & PLAN NOTE
Known COVID-19 positive with chest x-ray bilateral ground-glass opacities consistent with bilateral pneumonia.  Has been requiring supplemental oxygen, diminished air entry.  CRP 28--> 21-->17.31-->11.03  Ferritin 208--> 186  Procalcitonin 0.69, lactic acid on admission 2.3.  QTC on admission  MS, QTC on telemetry 0 4/07 475 MS, increased to 508 MS (EKG confirmed 490MS). On 4/9 QTC 434ms.   Patient has now completed course of azithromycin and Plaquenil.  Complete course of Prezcobix- on 4/13 not awake or strong enough to tolerate oral intake  Avoid steroids, aerosolized procedure  Trending CRP and ferritin  Isolation precautions  Appreciate pulmonary input  COVID was checked on 03/31

## 2020-04-13 NOTE — PROGRESS NOTES
"[facility] hospital  Adult Nutrition   Progress Note (Follow-Up)    SUMMARY     Recommendations/Interventions:    Recommendation/Intervention: 1.  Cont current diet and supplements as tolerate  2. Encourage intake of meals and supplements.   3.  May conside an appetite stimulant if oral intake does not maintain.      Goals:  PO intake to increase to at leat 50% of meals and supplements   Nutrition Goal Status: progressing towards goal      Dietitian Rounds Brief:    Spoke with RN regarding pt's intake    Reason for Assessment  Reason For Assessment: RD follow-up  Relevant Medical History: anemia, CKD 3, GERD, HLD, HTN  Interdisciplinary Rounds: attended    Nutrition Risk Screen  Nutrition Risk Screen: dysphagia or difficulty swallowing           Nutrition/Diet History  Patient Reported Diet/Restrictions/Preferences: other (see comments)  Spiritual, Cultural Beliefs, Oriental orthodox Practices, Values that Affect Care: no  Food Allergies: NKFA  Factors Affecting Nutritional Intake: decreased appetite, pain    Anthropometrics  Temp: 98.1 °F (36.7 °C)  Height Method: Stated  Height: 5' 11" (180.3 cm)  Height (inches): 71 in  Weight Method: Bed Scale  Weight: 88.3 kg (194 lb 10.7 oz)  Weight (lb): 194.67 lb  Ideal Body Weight (IBW), Male: 172 lb  % Ideal Body Weight, Male (lb): 113.18 %  BMI (Calculated): 27.2  BMI Grade: 25 - 29.9 - overweight  Weight Loss: unintentional  Usual Body Weight (UBW), k.8 kg  Weight Change Amount: 26 lb  % Usual Body Weight: 88.66  % Weight Change From Usual Weight: -11.52 %       Weight History:  Wt Readings from Last 10 Encounters:   20 88.3 kg (194 lb 10.7 oz)   20 99.8 kg (220 lb)   20 98.5 kg (217 lb 3.2 oz)   19 99.8 kg (220 lb)   19 100 kg (220 lb 7.4 oz)   19 98.9 kg (218 lb)   19 97 kg (213 lb 13.5 oz)   19 97 kg (213 lb 14.4 oz)   19 98.3 kg (216 lb 11.4 oz)   19 94.4 kg (208 lb 1.8 oz)   }  Lab/Procedures/Meds: " Pertinent Labs Reviewed  Clinical Chemistry:  Recent Labs   Lab 04/07/20  0644  04/11/20  0612  04/13/20  0610   *   < > 143   < > 141  141   K 4.0   < > 4.2   < > 4.4  4.4   *   < > 113*   < > 112*  112*   CO2 19*   < > 16*   < > 19*  19*      < > 82   < > 93  93   BUN 38*   < > 41*   < > 41*  41*   CREATININE 1.5*   < > 1.5*   < > 1.8*  1.8*   CALCIUM 9.3   < > 9.3   < > 9.4  9.4   PROT 8.1   < >  --    < > 8.3   ALBUMIN 2.8*   < >  --    < > 2.9*   BILITOT 0.7   < >  --    < > 0.7   ALKPHOS 94   < >  --    < > 104   AST 62*   < >  --    < > 40   ALT 47*   < >  --    < > 36   ANIONGAP 13   < > 14   < > 10  10   ESTGFRAFRICA 50.1*   < > 50.1*   < > 40.2*  40.2*   EGFRNONAA 43.3*   < > 43.3*   < > 34.8*  34.8*   MG 1.8   < > 1.7  --  1.9   PHOS 4.0  --  3.8  --  4.6*    < > = values in this interval not displayed.     CBC:   Recent Labs   Lab 04/13/20  0610   WBC 10.80   RBC 4.45*   HGB 10.9*   HCT 34.8*   *   MCV 78*   MCH 24.5*   MCHC 31.3*       Cardiac Profile:  Recent Labs   Lab 04/07/20  0644   *     Inflammatory Labs:  Recent Labs   Lab 04/08/20  0617 04/10/20  0804 04/12/20  1212   CRP 17.31* 11.03* 12.85*       Medications: Pertinent Medications reviewed  Scheduled Meds:   albuterol  4 puff Inhalation Q8H    alfuzosin  10 mg Oral Daily with breakfast    amLODIPine  10 mg Oral Daily    aspirin  324 mg Oral Daily    carvediloL  25 mg Oral BID WM    cefTRIAXone (ROCEPHIN) IVPB  1 g Intravenous Q24H    darunavir-cobicistat  1 tablet Oral Daily    enoxaparin  40 mg Subcutaneous Daily    pantoprazole  40 mg Oral Daily     Continuous Infusions:   sodium chloride 0.9% 50 mL/hr (04/12/20 2006)     PRN Meds:.acetaminophen, albuterol **AND** MDI Q4H PRN, calcium gluconate IVPB, calcium gluconate IVPB, calcium gluconate IVPB, HYDROcodone-acetaminophen, magnesium sulfate IVPB, magnesium sulfate IVPB, ondansetron, polyethylene glycol, potassium chloride in water  **AND** potassium chloride in water **AND** potassium chloride in water, sodium chloride 0.9%, sodium phosphate IVPB, sodium phosphate IVPB, sodium phosphate IVPB    Estimated/Assessed Needs    Weight Used For Calorie Calculations: 88.3 kg (194 lb 10.7 oz)  Energy Calorie Requirements (kcal): 7245-9566 kcal/kg 20-25 kcal/kg  Energy Need Method: Kcal/kg  Protein Requirements: 71-88 g/day (.8-1.0 g/kg)  Weight Used For Protein Calculations: 88.3 kg (194 lb 10.7 oz)     Estimated Fluid Requirement Method: RDA Method    Nutrition Prescription Ordered    Current Diet Order: Full liquid diet   Oral Nutrition Supplement: Ensure Enlive BID with one ice cream     Evaluation of Received Nutrient/Fluid Intake    Energy Calories Required: meeting needs  Protein Required: meeting needs  Fluid Required: meeting needs  Tolerance: tolerating  % Intake of Estimated Energy Needs: 25 - 50 %  % Meal Intake: 25 - 50 %    Intake/Output Summary (Last 24 hours) at 4/13/2020 1119  Last data filed at 4/12/2020 2200  Gross per 24 hour   Intake 455 ml   Output 1800 ml   Net -1345 ml      Nutrition Risk    Level of Risk/Frequency of Follow-up: high   Monitor and Evaluation    Food and Nutrient Intake: food and beverage intake, energy intake  Food and Nutrient Adminstration: diet order  Anthropometric Measurements: weight, weight change  Biochemical Data, Medical Tests and Procedures: electrolyte and renal panel, lipid profile, gastrointestinal profile, glucose/endocrine profile, inflammatory profile  Nutrition-Focused Physical Findings: overall appearance     Nutrition Follow-Up    RD Follow-up?: Yes     Nohemy Garcia, ROXANN 4/13/20

## 2020-04-13 NOTE — ASSESSMENT & PLAN NOTE
Does have baseline chronic kidney disease stage 3 however serum creatinine increased to 1.8.  Does have BPH with recurrent UTI however Iraheta catheter now in place   Slow IV fluids given not taking oral intake due to encephalopathy  Continue to hold lisinopril  Keep Iraheta in place  Renally dose all medications and avoid nephrotoxin drugs  Trending BMP

## 2020-04-13 NOTE — ASSESSMENT & PLAN NOTE
Acute illness with prolonged hospitalization intake due to encephalopathy.  Dietary follow-up.  Addressing goals of care.
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
## 2020-04-13 NOTE — ASSESSMENT & PLAN NOTE
Patient continues with decreased responsiveness with generalized weakness, poor appetite.  Only mumbling few words.  Suspect multifactorial secondary to infection, hypoxic respiratory failure, acute kidney injury, decreased nutrition, COVID encephalitis? Delirium precautions  ABG obtained 4/11 without significant hypoxia or hypercapnia.  Lactic acid, beta hydroxy, ammonia all checked.  Obtained CT head 4/12 without any abnormalities given hypercoagulable state with COVID-19  On 04/13 moaning and groaning, appears distressed, not able to give any useful history, concern for terminal delirium versus other  Following discussion with family will give IV morphine 1 dose and then follow-up on goals of care, recommending comfort directed care

## 2020-04-14 PROBLEM — Z51.5 COMFORT MEASURES ONLY STATUS: Status: ACTIVE | Noted: 2020-01-01

## 2020-04-14 NOTE — ASSESSMENT & PLAN NOTE
Secondary to COVID-19 and bilateral pneumonia.   Chest x-ray 4/10 with increased bilateral interstitial opacities.  Continue supplemental oxygen.  Dr. Pineda discussed with family on on 04/09 was made DNR DNI  Patient continues to decline with no improvement, he continues to pull off oxygen  Continue to address goals of care with family, poor prognosis, discussed again on 04/14 and now transitioning to comfort directed care   Supplemental oxygen as able  P.r.n. morphine and Ativan

## 2020-04-14 NOTE — PT/OT/SLP DISCHARGE
Occupational Therapy Discharge Summary    Asif Velez  MRN: [id number]   Principal Problem: Acute respiratory failure with hypoxia      Patient Discharged from acute Occupational Therapy on 04/14/2020.  Please refer to prior OT note dated 04/14/2020 for functional status.    Assessment:      Patient was discharged unexpectedly.  Information required to complete an accurate discharge summary is unknown.  Refer to therapy initial evaluation and last progress note for initial and most recent functional status and goal achievement.  Recommendations made may be found in medical record. Patient is no longer making progress. Patient has not met goals. Pt has begun comfort care due to lack of progress and pain     Objective:     GOALS:   Multidisciplinary Problems     Occupational Therapy Goals        Problem: Occupational Therapy Goal    Goal Priority Disciplines Outcome Interventions   Occupational Therapy Goal     OT, PT/OT Ongoing, Progressing    Description:  Goals to be met by: d/c     Patient will increase functional independence with ADLs by performing:    LE Dressing with Moderate Assistance.  Grooming while seated at sink in w/c with Modified [place].  Toileting from toilet with Minimal Assistance for hygiene and clothing management.   Toilet transfer to toilet with Minimal Assistance.                      Reasons for Discontinuation of Therapy Services  MD discontinued therapy services; Pt has begun comfort care due to lack of progress and pain      Plan:     Patient Discharged to: pt remains at [facility] at this time; comfort care    Candi See OT  4/14/20204:44 PM
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
## 2020-04-14 NOTE — NURSING
PER FAMILY REQUEST AFTER DISCUSSING OPTIONS WITH PROVIDER, PATIENT MADE COMFORT CARE ONLY, FAMILY VERBALIZED UNDERSTANDING TO EDUCATION ON COMFORT CARE, PT DC'D RIGHT HAND IV, NEW LEFT AC 20G STARTED X1 ATTEMPT, PT MEDICATED FOR COMFORT, WILL CONTINUE TO MONITOR

## 2020-04-14 NOTE — PLAN OF CARE
04/14/20 0802   Patient Assessment/Suction   Level of Consciousness (AVPU) responds to voice   PRE-TX-O2   O2 Device (Oxygen Therapy) nasal cannula w/ humidification   $ Is the patient on Low Flow Oxygen? Yes   Flow (L/min) 5   SpO2 95 %   Pulse Oximetry Type Continuous   $ Pulse Oximetry - Multiple Charge Pulse Oximetry - Multiple   Pulse (!) 115   Resp (!) 24   Aerosol Therapy   $ Aerosol Therapy Charges Other (see comments)  (not able to do at this time.)   Respiratory Evaluation   $ Care Plan Tech Time 15 min

## 2020-04-14 NOTE — ASSESSMENT & PLAN NOTE
Patient continues with decreased responsiveness with generalized weakness, poor appetite.  Only mumbling few words.  Suspect multifactorial secondary to infection, hypoxic respiratory failure, acute kidney injury, decreased nutrition, COVID encephalitis? Delirium precautions  ABG obtained 4/11 without significant hypoxia or hypercapnia.  Lactic acid, beta hydroxy, ammonia all checked.  Obtained CT head 4/12 without any abnormalities given hypercoagulable state with COVID-19  On 04/13 moaning and groaning, appears distressed, not able to give any useful history, concern for terminal delirium versus other  On 04/14 transitioning to comfort, Ativan and morphine, titrated as per distressed

## 2020-04-14 NOTE — ASSESSMENT & PLAN NOTE
Does have baseline chronic kidney disease stage 3 however serum creatinine increased to 1.8.  Does have BPH with recurrent UTI however Iraheta catheter now in place   All oral medications on hold  Keep Iraheta in place

## 2020-04-14 NOTE — ASSESSMENT & PLAN NOTE
On 4/14 again discussed with family and decision to transition to comfort directed care given no improvement, remains encephalopathic, declining without any oral intake, distressed.  Added p.r.n. morphine and Ativan will titrate as needed.  Medications for symptomatic management.

## 2020-04-14 NOTE — ASSESSMENT & PLAN NOTE
Known COVID-19 positive with chest x-ray bilateral ground-glass opacities consistent with bilateral pneumonia.  Has been requiring supplemental oxygen, diminished air entry.  CRP 28--> 21-->17.31-->11.03  Ferritin 208--> 186  Procalcitonin 0.69, lactic acid on admission 2.3.  QTC on admission  MS, QTC on telemetry 0 4/07 475 MS, increased to 508 MS (EKG confirmed 490MS). On 4/9 QTC 434ms.   Patient has now completed course of azithromycin and Plaquenil.  Complete course of Prezcobix- on 4/13 not awake or strong enough to tolerate oral intake  Avoid steroids, aerosolized procedure  Trending CRP and ferritin  Isolation precautions  Appreciate pulmonary input  COVID was checked on 03/31  On 04/14 comfort directed care

## 2020-04-14 NOTE — PHYSICIAN QUERY
"PT Name: Asif Velez  MR #: [id number]     CDS/: Víctor Andrade               Contact information: [phone]  This form is a permanent document in the medical record.     Query Date: April 14, 2020    Physician Query - Neurological Condition Clarification    By submitting this query, we are merely seeking further clarification of documentation to reflect the severity of illness of your patient. Please utilize your independent clinical judgment when addressing the question(s) below.    The Medical record reflects the following:     Supporting Clinical Findings Location in Medical Record   X Encephalopathy, multifactorial  Patient continues with decreased responsiveness with generalized weakness, poor appetite.  Only mumbling few words.  Suspect multifactorial secondary to infection, hypoxic respiratory failure, acute kidney injury, decreased nutrition, COVID encephalitis? Delirium precautions  ABG obtained 4/11 without significant hypoxia or hypercapnia.  Lactic acid, beta hydroxy, ammonia all checked.  Obtained CT head 4/12 without any abnormalities given hypercoagulable state with COVID-19  On 04/13 moaning and groaning, appears distressed, not able to give any useful history, concern for terminal delirium versus other  On 04/14 transitioning to comfort, Ativan and morphine, titrated as per distressed    Encephalopathic, not following commands, not answering questions, restless and fidgeting, at times mumbles incomprehensive sounds, severe generalized weakness but moving all four extremities   Skin: Skin is warm and dry.  4/14 Dr. Wilkinson     Dear Dr. Wilkinson, multifactorial encephalopathy, even with the factors listed, will code to "encephalopathy, unspecified."  If possible, please clarify type(s):  [   ] Anoxic/Hypoxic Encephalopathy- Brain damage due to anoxia/hypoxia   [   ] Hepatic Encephalopathy - Due to liver disease/failure   [   ] Hypertensive Encephalopathy - Signs and/or symptoms of cerebral edema " caused by severe and/or sudden rises in BP   [  x ] Metabolic Encephalopathy - Due to electrolye imbalance, metabolic derangements, or infections processes, includes Septic Encephalopathy   [   ] Toxic Encephalopathy - Due to drugs, chemicals, or other toxic substances   [   ] Other Encephalopathy - Includes uremic encephalopathy   [   ] Unspecified Encephalopathy      [   ] Other Neurological Condition-  Includes Post-ictal altered mental status. (please specify condition): _________   [   ]  Clinically Undetermined     Please document in your progress notes daily for the duration of treatment until resolved, and include in your discharge summary.

## 2020-04-14 NOTE — PT/OT/SLP DISCHARGE
Physical Therapy Discharge Summary    Name: Asif Velez  MRN: [id number]   Principal Problem: Acute respiratory failure with hypoxia     Patient Discharged from acute Physical Therapy on 2020.  Please refer to prior PT noted date on 20 for functional status.     Assessment:     Pt now on comfort measures.    Objective:     GOALS:   Multidisciplinary Problems     Physical Therapy Goals     Not on file          Multidisciplinary Problems (Resolved)        Problem: Physical Therapy Goal    Goal Priority Disciplines Outcome Goal Variances Interventions   Physical Therapy Goal   (Resolved)     PT, PT/OT Met     Description:  Goals to be met by: D/C     Patient will increase functional independence with mobility by performin. Supine to sit with MInimal Assistance  2. Sit to stand transfer with Minimal Assistance  3. Gait  x 50  feet with Contact Guard Assistance using Rolling Walker.                       Reasons for Discontinuation of Therapy Services  comfort care      Plan:     Patient Discharged to: Palliative Care/Hospice.    Shae Monson, PT  2020

## 2020-04-14 NOTE — NURSING
Patient daughter called for update, p;an of care and current patient status discussed, no changes from yesterdays discussion noted, pt family member verbalized understanding.
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
## 2020-04-15 NOTE — PLAN OF CARE
04/15/20 0736   Patient Assessment/Suction   Level of Consciousness (AVPU) responds to voice   Respiratory Effort Unlabored;Normal   Expansion/Accessory Muscles/Retractions no use of accessory muscles;no retractions;expansion symmetric   All Lung Fields Breath Sounds diminished   Rhythm/Pattern, Respiratory unlabored;pattern regular;depth regular   Cough Frequency infrequent   Cough Type good   PRE-TX-O2   O2 Device (Oxygen Therapy) High Flow nasal Cannula   $ Is the patient on Low Flow Oxygen? Yes   Flow (L/min) 5   SpO2 98 %   Pulse Oximetry Type Continuous   $ Pulse Oximetry - Multiple Charge Pulse Oximetry - Multiple   Pulse 78   Resp 18   Inhaler   $ Inhaler Charges MDI (Metered Dose Inahler) Treatment   Daily Review of Necessity (Inhaler) completed   Respiratory Treatment Status (Inhaler) given   Treatment Route (Inhaler) spacer/holding chamber   Patient Position (Inhaler) HOB elevated   Post Treatment Assessment (Inhaler) increased aeration   Signs of Intolerance (Inhaler) none   Breath Sounds Post-Respiratory Treatment   Throughout All Fields Post-Treatment All Fields   Throughout All Fields Post-Treatment aeration increased   Post-treatment Heart Rate (beats/min) 94   Post-treatment Resp Rate (breaths/min) 18

## 2020-04-15 NOTE — PLAN OF CARE
04/14/20 2000   Respiratory Evaluation   $ Care Plan Tech Time 15 min   Evaluation For   (CARE PLAN)

## 2020-04-15 NOTE — PROGRESS NOTES
Noted pt now on comfort care only. RD to sign off. Please re-consult if needed.    Tere Chaves RD 04/15/2020 8:57 AM
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
## 2020-04-16 NOTE — ASSESSMENT & PLAN NOTE
Patient continues with decreased responsiveness with generalized weakness, poor appetite.  Only mumbling few words.  Suspect multifactorial secondary to infection, hypoxic respiratory failure, acute kidney injury, decreased nutrition, COVID encephalitis? Delirium precautions  ABG obtained 4/11 without significant hypoxia or hypercapnia.  Lactic acid, beta hydroxy, ammonia all checked.  Obtained CT head 4/12 without any abnormalities given hypercoagulable state with COVID-19  On 04/13 moaning and groaning, appears distressed, not able to give any useful history, concern for terminal delirium versus other  On 04/14 transitioning to comfort, Ativan and morphine, titrated as per distressed  04/15 - Discussed with daughter - okay with hospice. SW consult for the same

## 2020-04-16 NOTE — PROGRESS NOTES
[facility] Medicine  Progress Note    Patient Name: Asif Velez  MRN: [id number]  Patient Class: IP- Inpatient   Admission Date: 4/4/2020  Length of Stay: 12 days  Attending Physician: Gustabo Carpenter MD  Primary Care Provider: Obi Russell MD        Subjective:     Principal Problem:Acute respiratory failure with hypoxia        HPI:  History was obtained from the patient and ER physician Sign-out. Patient presented with cough, shortness of breath and fever x 5 days. His symptoms are progressively getting worse. He also reports associated fatigue. He denies nausea, vomiting, diarrhea. Denies any alleviating or worsening factors. Reports contact with his grand daughter who was +COVID 19. Patient was seen here at [facility] ED on 3/30 and treated with azithromycin and tessalon perles with no improvement. Patient decided to come to the ER for further evaluation.     In the emergency room, patient was saturating in 80s on room air. He required 5L oxygen to bring the saturation in 90s. His saturation again dropped to 80s he was placed on venti mask. Patient CBC was unremarkable. CMP was unremarkable. CRP was elevated to 28. BNP and troponin was negative. Reviewed EKG and does not show new ischemic changes and no QTC prolongation. CXR shows bilateral infiltratres consistent with COVID 19.     Decision to admit was taken and patient was informed about the plan of care.     Overview/Hospital Course:  I, Dr Wilkinson, assumed care of this patient on 04/07/2020.  Patient admitted with acute hypoxic respiratory failure secondary to COVID-19 pneumonia.  He was initially admitted to the ICU and subsequently transferred to telemetry floor.  He was started on azithromycin, Plaquenil as well as Prezcobix.  Pulmonary was consulted.  Generalized weakness with low appetite.  On 04/07 still requiring 6 L supplemental oxygen, complaining of some left upper abdominal discomfort, generalized weakness, seen by PT. On  4/09 Dr Pineda discussed with family, now DNR/DNI. On 4/11 decreased responsiveness/encephalopathic, mumbles incomprehensible sounds at time, shallow breathing, did discuss with daughter Rosanne Mendoza on 4/10 and they were not ready for comfort directed care but aware patient was declining and prognosis poor. On 4/12 continues with encephalopathy and generalized weakness, no awake or strong to tolerate oral intake, CT head without any acute stroke, ammonia, ABG, lactic acid, BHB all checked. Called and discussed with family, asking about remdesivir and discussed he would not meet criteria, they are ware he is declining, but do not want to give up, not ready for comfort care.  On 4/14 continued with declining mental status, distressed/moaning/groaning, not taking oral, not following commands, again discussed with daughter Rosanne Mendoza and explained clinical status, her mother was nearby (has dementia) and now agreeable to comfort directed care with morphine and ativan, no further lab checks.     Interval History: No acute overnight events. Comfort measures in place. COVID-19 re-testing returned positive.     Review of Systems   Unable to perform ROS: Mental status change     Objective:     Vital Signs (Most Recent):  Temp: 97.4 °F (36.3 °C) (04/16/20 0745)  Pulse: 74 (04/16/20 0915)  Resp: (!) 22 (04/16/20 0915)  BP: 120/73 (04/16/20 0745)  SpO2: 99 % (04/16/20 1010) Vital Signs (24h Range):  Temp:  [97.4 °F (36.3 °C)-97.8 °F (36.6 °C)] 97.4 °F (36.3 °C)  Pulse:  [] 74  Resp:  [15-28] 22  SpO2:  [86 %-99 %] 99 %  BP: (120-121)/(73-76) 120/73     Weight: 88.3 kg (194 lb 10.7 oz)  Body mass index is 27.15 kg/m².    Intake/Output Summary (Last 24 hours) at 4/16/2020 1328  Last data filed at 4/16/2020 0634  Gross per 24 hour   Intake 0 ml   Output 400 ml   Net -400 ml      Physical Exam   Constitutional: He appears distressed.   Chronically ill-appearing, frail elderly male lying in bed, restless,  encephalopathic   HENT:   Head: Normocephalic and atraumatic.   Cardiovascular: Normal rate and regular rhythm.   Pulmonary/Chest: No accessory muscle usage. Tachypnea noted. No respiratory distress. He has rhonchi.   Abdominal: Soft. Bowel sounds are normal. He exhibits no distension. There is no tenderness. There is no guarding.   Genitourinary:   Genitourinary Comments: Iraheta catheter present   Musculoskeletal: He exhibits deformity. He exhibits no edema.   Contracture of right upper extremity   Neurological:   Encephalopathic, not following commands, not answering questions, restless and fidgeting. Severe generalized weakness but moving all four extremities   Skin: Skin is warm and dry.   Psychiatric:   Unable to evaluate   Nursing note and vitals reviewed.        Assessment/Plan:      Active Hospital Problems    Diagnosis  POA    *Acute respiratory failure with hypoxia [J96.01]  Yes    Comfort measures only status [Z51.5]  Not Applicable    Malnutrition of moderate degree [E44.0]  Yes     Chronic    Encephalopathy, multifactorial [G93.41]  No    Thrombocytosis [D47.3]  Yes     Chronic    GERD (gastroesophageal reflux disease) [K21.9]  Yes     Chronic    BPH with history of recurrent UTI [N40.0]  Yes     Chronic    Metabolic acidosis [E87.2]  Yes     Chronic    Transaminitis [R74.0]  Yes    Severe generalized weakness [R53.1]  Yes    Lower respiratory tract infection due to COVID-19 virus [U07.1, J22]  Yes    COVID-19 virus detected [U07.1]  Yes    Hypertension [I10]  Yes    Anemia, chronic disease [D63.8]  Yes    CONSTANCE on CKD III [N17.9]  Yes      Resolved Hospital Problems    Diagnosis Date Resolved POA    QT prolongation [R94.31] 04/09/2020 No    Hypernatremia [E87.0] 04/11/2020 No     Plan:  COVID-19 re-testing positive  Hospice consulted  Comfort measures until then   No routine labs   Prn IM lorazepam and morphine      VTE Risk Mitigation (From admission, onward)         Ordered     IP VTE  HIGH RISK PATIENT  Once      04/04/20 1738                      Gustabo Carpenter MD  Department of Hospital Medicine   [facility]

## 2020-04-16 NOTE — PLAN OF CARE
04/16/20 1241   Discharge Reassessment   Assessment Type Discharge Planning Reassessment   Anticipated Discharge Disposition HospiceHome   Post-Acute Status   Post-Acute Authorization Hospice   Hospice Status   (Family discussing plan this evening.)     SW contacted Pt's daughter Rosanne at [phone] to discuss hospice consult.  Discussed options for home hospice, as well as those agencies that contract with [facility] for inpatient hospice.  Per daughter, family is to have a meeting this evening in order to discuss where they wish to care for him at discharge.  She requested that SW follow up with her in the morning, and verbalized understanding that a decision will need to be made in a timely matter.  SW to follow up on tomorrow.

## 2020-04-16 NOTE — SUBJECTIVE & OBJECTIVE
Interval History: No acute overnight events. Comfort measures in place. COVID-19 re-testing returned positive.     Review of Systems   Unable to perform ROS: Mental status change     Objective:     Vital Signs (Most Recent):  Temp: 97.4 °F (36.3 °C) (04/16/20 0745)  Pulse: 74 (04/16/20 0915)  Resp: (!) 22 (04/16/20 0915)  BP: 120/73 (04/16/20 0745)  SpO2: 99 % (04/16/20 1010) Vital Signs (24h Range):  Temp:  [97.4 °F (36.3 °C)-97.8 °F (36.6 °C)] 97.4 °F (36.3 °C)  Pulse:  [] 74  Resp:  [15-28] 22  SpO2:  [86 %-99 %] 99 %  BP: (120-121)/(73-76) 120/73     Weight: 88.3 kg (194 lb 10.7 oz)  Body mass index is 27.15 kg/m².    Intake/Output Summary (Last 24 hours) at 4/16/2020 1328  Last data filed at 4/16/2020 0634  Gross per 24 hour   Intake 0 ml   Output 400 ml   Net -400 ml      Physical Exam   Constitutional: He appears distressed.   Chronically ill-appearing, frail elderly male lying in bed, restless, encephalopathic   HENT:   Head: Normocephalic and atraumatic.   Cardiovascular: Normal rate and regular rhythm.   Pulmonary/Chest: No accessory muscle usage. Tachypnea noted. No respiratory distress. He has rhonchi.   Abdominal: Soft. Bowel sounds are normal. He exhibits no distension. There is no tenderness. There is no guarding.   Genitourinary:   Genitourinary Comments: Iraheta catheter present   Musculoskeletal: He exhibits deformity. He exhibits no edema.   Contracture of right upper extremity   Neurological:   Encephalopathic, not following commands, not answering questions, restless and fidgeting. Severe generalized weakness but moving all four extremities   Skin: Skin is warm and dry.   Psychiatric:   Unable to evaluate   Nursing note and vitals reviewed.

## 2020-04-17 NOTE — PLAN OF CARE
04/17/20 1127   Discharge Reassessment   Assessment Type Discharge Planning Reassessment   Anticipated Discharge Disposition HospiceHome   Patient choice form signed by patient/caregiver List from [employer]  (Due to COVID-19 precautions, Makeda Mendoza, daughter ([phone]) contacted via phone, hospice options discussed, and Pt Choice Form completed with verbal / phone consent.)   Post-Acute Status   Post-Acute Authorization Hospice   Hospice Status Referrals Sent     Preferences in hospice discussed, and Pt's daughter requested that SW send referral to [facility].  She also advised SW that family may have additional preferences that she is attempting to get contact information for.  SW sending referral via fax, and will await response.    1541  SW following up with [facility] and informed that they have attempted to contact Pt's daughter 3 times and unable to reach her.  SW attempted to contact daughter, Makeda Mendoza, at [phone], and had to leave message requesting a return phone call.  Will await response.

## 2020-04-18 PROBLEM — J96.01 ACUTE RESPIRATORY FAILURE WITH HYPOXIA: Status: RESOLVED | Noted: 2020-01-01 | Resolved: 2020-01-01

## 2020-04-18 NOTE — PROGRESS NOTES
[facility] Medicine  Progress Note    Patient Name: Asif Velez  MRN: [id number]  Patient Class: IP- Inpatient   Admission Date: 4/4/2020  Length of Stay: 13 days  Attending Physician: Gustabo Carpenter MD  Primary Care Provider: Obi Russell MD        Subjective:     Principal Problem:Acute respiratory failure with hypoxia        HPI:  History was obtained from the patient and ER physician Sign-out. Patient presented with cough, shortness of breath and fever x 5 days. His symptoms are progressively getting worse. He also reports associated fatigue. He denies nausea, vomiting, diarrhea. Denies any alleviating or worsening factors. Reports contact with his grand daughter who was +COVID 19. Patient was seen here at [facility] ED on 3/30 and treated with azithromycin and tessalon perles with no improvement. Patient decided to come to the ER for further evaluation.     In the emergency room, patient was saturating in 80s on room air. He required 5L oxygen to bring the saturation in 90s. His saturation again dropped to 80s he was placed on venti mask. Patient CBC was unremarkable. CMP was unremarkable. CRP was elevated to 28. BNP and troponin was negative. Reviewed EKG and does not show new ischemic changes and no QTC prolongation. CXR shows bilateral infiltratres consistent with COVID 19.     Decision to admit was taken and patient was informed about the plan of care.     Overview/Hospital Course:  I, Dr Wilkinson, assumed care of this patient on 04/07/2020.  Patient admitted with acute hypoxic respiratory failure secondary to COVID-19 pneumonia.  He was initially admitted to the ICU and subsequently transferred to telemetry floor.  He was started on azithromycin, Plaquenil as well as Prezcobix.  Pulmonary was consulted.  Generalized weakness with low appetite.  On 04/07 still requiring 6 L supplemental oxygen, complaining of some left upper abdominal discomfort, generalized weakness, seen by PT. On  4/09 Dr Pineda discussed with family, now DNR/DNI. On 4/11 decreased responsiveness/encephalopathic, mumbles incomprehensible sounds at time, shallow breathing, did discuss with daughter Rosanne Mendoza on 4/10 and they were not ready for comfort directed care but aware patient was declining and prognosis poor. On 4/12 continues with encephalopathy and generalized weakness, no awake or strong to tolerate oral intake, CT head without any acute stroke, ammonia, ABG, lactic acid, BHB all checked. Called and discussed with family, asking about remdesivir and discussed he would not meet criteria, they are ware he is declining, but do not want to give up, not ready for comfort care.  On 4/14 continued with declining mental status, distressed/moaning/groaning, not taking oral, not following commands, again discussed with daughter Rosanne Mendoza and explained clinical status, her mother was nearby (has dementia) and now agreeable to comfort directed care with morphine and ativan, no further lab checks.     Interval History: No acute overnight events. Remains comfortable. Awaiting hospice placement.    Review of Systems   Unable to perform ROS: Mental status change     Objective:     Vital Signs (Most Recent):  Temp: 97.5 °F (36.4 °C) (04/17/20 1430)  Pulse: 96 (04/17/20 1430)  Resp: (!) 28 (04/17/20 1430)  BP: 111/69 (04/17/20 1430)  SpO2: 95 % (04/17/20 1430) Vital Signs (24h Range):  Temp:  [97.5 °F (36.4 °C)-97.8 °F (36.6 °C)] 97.5 °F (36.4 °C)  Pulse:  [62-96] 96  Resp:  [26-35] 28  SpO2:  [93 %-95 %] 95 %  BP: (107-111)/(49-69) 111/69     Weight: 88.3 kg (194 lb 10.7 oz)  Body mass index is 27.15 kg/m².    Intake/Output Summary (Last 24 hours) at 4/17/2020 2030  Last data filed at 4/17/2020 1500  Gross per 24 hour   Intake --   Output 300 ml   Net -300 ml      Physical Exam   Constitutional: No distress.   Chronically ill-appearing, frail elderly male lying in bed, encephalopathic   HENT:   Head: Normocephalic and  atraumatic.   Cardiovascular: Normal rate and regular rhythm.   Pulmonary/Chest: No accessory muscle usage. Tachypnea noted. No respiratory distress. He has rhonchi.   Abdominal: Soft. Bowel sounds are normal. He exhibits no distension. There is no tenderness. There is no guarding.   Genitourinary:   Genitourinary Comments: Iraheta catheter present   Musculoskeletal: He exhibits deformity. He exhibits no edema.   Contracture of right upper extremity   Neurological:   Encephalopathic, not following commands, not answering questions, restless and fidgeting. Severe generalized weakness but moving all four extremities   Skin: Skin is warm and dry.   Psychiatric:   Unable to evaluate   Nursing note and vitals reviewed.        Assessment/Plan:      Active Hospital Problems    Diagnosis  POA    *Acute respiratory failure with hypoxia [J96.01]  Yes    Comfort measures only status [Z51.5]  Not Applicable    Malnutrition of moderate degree [E44.0]  Yes     Chronic    Encephalopathy, multifactorial [G93.41]  No    Thrombocytosis [D47.3]  Yes     Chronic    GERD (gastroesophageal reflux disease) [K21.9]  Yes     Chronic    BPH with history of recurrent UTI [N40.0]  Yes     Chronic    Metabolic acidosis [E87.2]  Yes     Chronic    Transaminitis [R74.0]  Yes    Severe generalized weakness [R53.1]  Yes    Lower respiratory tract infection due to COVID-19 virus [U07.1, J22]  Yes    COVID-19 virus detected [U07.1]  Yes    Hypertension [I10]  Yes    Anemia, chronic disease [D63.8]  Yes    CONSTANCE on CKD III [N17.9]  Yes      Resolved Hospital Problems    Diagnosis Date Resolved POA    QT prolongation [R94.31] 04/09/2020 No    Hypernatremia [E87.0] 04/11/2020 No     Plan:  Hospice placement in progress; SW following   Comfort measures until then   No routine labs   Prn IM lorazepam and morphine for anxiety and pain/respiratory distress respectively     VTE Risk Mitigation (From admission, onward)         Ordered     IP VTE  HIGH RISK PATIENT  Once      04/04/20 1738                      Gustabo Carpenter MD  Department of Hospital Medicine   Critical access hospital

## 2020-04-18 NOTE — PROGRESS NOTES
I was called by the bedside RN to confirm the death of Mr. Velez.    No family present at the bedside during my encounter and assessment.    Patient admitted for COVID-19 pneumonia and respiratory failure    Patient identified. Lying in the bed with eyes closed and no signs of life.  No respiratory effort noted. No response to verbal stimuli.  No carotid pulse palpable.  Pupils fixed and dilated bilaterally.  No heart or breath sounds audible during auscultation.    Death confirmed at 1420 hrs on 04/18/2020.  I offered my condolences to the daughter Rosanne via telephone.    Gustabo Carpenter MD

## 2020-04-18 NOTE — RESPIRATORY THERAPY
04/17/20 2159   Patient Assessment/Suction   Level of Consciousness (AVPU) responds to pain   Respiratory Effort Unlabored   Expansion/Accessory Muscles/Retractions accessory muscle use   All Lung Fields Breath Sounds diminished   PRE-TX-O2   O2 Device (Oxygen Therapy) nasal cannula   Flow (L/min) 0   SpO2 (!) 93 %   Pulse Oximetry Type Intermittent   $ Pulse Oximetry - Multiple Charge Pulse Oximetry - Multiple   Pulse 100   Resp (!) 24   Inhaler   $ Inhaler Charges PRN treatment not required   Respiratory Evaluation   $ Care Plan Tech Time 15 min   Pt unable to do a MDI

## 2020-04-18 NOTE — SUBJECTIVE & OBJECTIVE
Interval History: No acute overnight events. Remains comfortable. Awaiting hospice placement.    Review of Systems   Unable to perform ROS: Mental status change     Objective:     Vital Signs (Most Recent):  Temp: 97.5 °F (36.4 °C) (04/17/20 1430)  Pulse: 96 (04/17/20 1430)  Resp: (!) 28 (04/17/20 1430)  BP: 111/69 (04/17/20 1430)  SpO2: 95 % (04/17/20 1430) Vital Signs (24h Range):  Temp:  [97.5 °F (36.4 °C)-97.8 °F (36.6 °C)] 97.5 °F (36.4 °C)  Pulse:  [62-96] 96  Resp:  [26-35] 28  SpO2:  [93 %-95 %] 95 %  BP: (107-111)/(49-69) 111/69     Weight: 88.3 kg (194 lb 10.7 oz)  Body mass index is 27.15 kg/m².    Intake/Output Summary (Last 24 hours) at 4/17/2020 2030  Last data filed at 4/17/2020 1500  Gross per 24 hour   Intake --   Output 300 ml   Net -300 ml      Physical Exam   Constitutional: No distress.   Chronically ill-appearing, frail elderly male lying in bed, encephalopathic   HENT:   Head: Normocephalic and atraumatic.   Cardiovascular: Normal rate and regular rhythm.   Pulmonary/Chest: No accessory muscle usage. Tachypnea noted. No respiratory distress. He has rhonchi.   Abdominal: Soft. Bowel sounds are normal. He exhibits no distension. There is no tenderness. There is no guarding.   Genitourinary:   Genitourinary Comments: Iraheta catheter present   Musculoskeletal: He exhibits deformity. He exhibits no edema.   Contracture of right upper extremity   Neurological:   Encephalopathic, not following commands, not answering questions, restless and fidgeting. Severe generalized weakness but moving all four extremities   Skin: Skin is warm and dry.   Psychiatric:   Unable to evaluate   Nursing note and vitals reviewed.

## 2020-04-18 NOTE — DISCHARGE SUMMARY
[facility] Medicine  Discharge Summary      Patient Name: Asif Velez  MRN: [id number]  Admission Date: 4/4/2020  Hospital Length of Stay: 14 days  Discharge Date and Time: 4/18/2020   Death pronounced at 2:20 p.m on 04/18/2020  Attending Physician: Gustabo Carpenter MD   Discharging Provider: Gustabo Carpenter MD  Primary Care Provider: Obi Russell MD      HPI:   History was obtained from the patient and ER physician Sign-out. Patient presented with cough, shortness of breath and fever x 5 days. His symptoms are progressively getting worse. He also reports associated fatigue. He denies nausea, vomiting, diarrhea. Denies any alleviating or worsening factors. Reports contact with his grand daughter who was +COVID 19. Patient was seen here at [facility] ED on 3/30 and treated with azithromycin and tessalon perles with no improvement. Patient decided to come to the ER for further evaluation.     In the emergency room, patient was saturating in 80s on room air. He required 5L oxygen to bring the saturation in 90s. His saturation again dropped to 80s he was placed on venti mask. Patient CBC was unremarkable. CMP was unremarkable. CRP was elevated to 28. BNP and troponin was negative. Reviewed EKG and does not show new ischemic changes and no QTC prolongation. CXR shows bilateral infiltratres consistent with COVID 19.     Decision to admit was taken and patient was informed about the plan of care.     * No surgery found *      Hospital Course:   I, Dr Wilkinson, assumed care of this patient on 04/07/2020.  Patient admitted with acute hypoxic respiratory failure secondary to COVID-19 pneumonia.  He was initially admitted to the ICU and subsequently transferred to telemetry floor.  He was started on azithromycin, Plaquenil as well as Prezcobix.  Pulmonary was consulted.  Generalized weakness with low appetite.  On 04/07 still requiring 6 L supplemental oxygen, complaining of some left upper abdominal  discomfort, generalized weakness, seen by PT. On 4/09 Dr Pineda discussed with family, now DNR/DNI. On 4/11 decreased responsiveness/encephalopathic, mumbles incomprehensible sounds at time, shallow breathing, did discuss with daughter Rosanne Mendoza on 4/10 and they were not ready for comfort directed care but aware patient was declining and prognosis poor. On 4/12 continues with encephalopathy and generalized weakness, no awake or strong to tolerate oral intake, CT head without any acute stroke, ammonia, ABG, lactic acid, BHB all checked. Called and discussed with family, asking about remdesivir and discussed he would not meet criteria, they are ware he is declining, but do not want to give up, not ready for comfort care.  On 4/14 continued with declining mental status, distressed/moaning/groaning, not taking oral, not following commands, again discussed with daughter Rosanne Mendoza and explained clinical status, her mother was nearby (has dementia) and now agreeable to comfort directed care with morphine and ativan, no further lab checks.     04/15-04/18 - I assumed care of patient on 04/15.  I discussed plan of care with her daughter Rosanne.  We agreed upon transitioning to hospice. In the meanwhile, he was on comfort measures. Patient passed away on 04/18 even before being accepted into hospice medical facility.  See death note for further details.     Consults:   Consults (From admission, onward)        Status Ordering Provider     Inpatient consult to Hospitalist  Once     Provider:  Jeff Hernandez NP    Acknowledged TOÑO ARMENTA     Inpatient consult to Pulmonology  Once     Provider:  Prasanth Dorado MD    Completed JEFF HERNANDEZ     Inpatient consult to   Once     Provider:  (Not yet assigned)    Acknowledged GLORIA CLEMENT          No new Assessment & Plan notes have been filed under this hospital service since the last note was generated.  Service: Hospital  Medicine    Final Active Diagnoses:    Diagnosis Date Noted POA    PRINCIPAL PROBLEM:  Acute respiratory failure with hypoxia [J96.01] 2020 Yes    Comfort measures only status [Z51.5] 2020 Not Applicable    Malnutrition of moderate degree [E44.0] 2020 Yes     Chronic    Encephalopathy, multifactorial [G93.41] 04/10/2020 No    Thrombocytosis [D47.3] 04/10/2020 Yes     Chronic    GERD (gastroesophageal reflux disease) [K21.9] 2020 Yes     Chronic    BPH with history of recurrent UTI [N40.0] 2020 Yes     Chronic    Metabolic acidosis [E87.2] 2020 Yes     Chronic    Transaminitis [R74.0] 2020 Yes    Severe generalized weakness [R53.1] 2020 Yes    Lower respiratory tract infection due to COVID-19 virus [U07.1, J22] 2020 Yes    COVID-19 virus detected [U07.1] 2020 Yes    Hypertension [I10] 2020 Yes    Anemia, chronic disease [D63.8] 2017 Yes    CONSTANCE on CKD III [N17.9] 2017 Yes      Problems Resolved During this Admission:    Diagnosis Date Noted Date Resolved POA    QT prolongation [R94.31] 2020 No    Hypernatremia [E87.0] 2020 No       Discharged Condition:     Disposition:       Pending Diagnostic Studies:     None         Medications:  None (patient  at medical facility)    Indwelling Lines/Drains at time of discharge:   Lines/Drains/Airways     Drain                 Urethral Catheter 04/10/20 0232 16 Fr. 8 days                Time spent on the discharge of patient: 15 minutes         Gustabo Carpenter MD  Department of Hospital Medicine  Critical access hospital

## 2020-04-20 NOTE — PLAN OF CARE
20 0950   Final Note   Assessment Type Final Discharge Note   Anticipated Discharge Disposition

## 2023-06-13 NOTE — TELEPHONE ENCOUNTER
----- Message from Ange Chacon MD sent at 5/11/2019  1:11 PM CDT -----  C&S - e.coli    Rec: Bactrim DS po bid x 2 weeks           Keep f/u appt   I independently performed the documented history, exam, and medical decision making.